# Patient Record
Sex: FEMALE | Race: WHITE | URBAN - METROPOLITAN AREA
[De-identification: names, ages, dates, MRNs, and addresses within clinical notes are randomized per-mention and may not be internally consistent; named-entity substitution may affect disease eponyms.]

---

## 2017-10-05 ENCOUNTER — HISTORICAL (OUTPATIENT)
Dept: ADMINISTRATIVE | Facility: HOSPITAL | Age: 67
End: 2017-10-05

## 2017-10-26 ENCOUNTER — HISTORICAL (OUTPATIENT)
Dept: ADMINISTRATIVE | Facility: HOSPITAL | Age: 67
End: 2017-10-26

## 2018-03-19 ENCOUNTER — HISTORICAL (OUTPATIENT)
Dept: INFUSION THERAPY | Facility: HOSPITAL | Age: 68
End: 2018-03-19

## 2019-03-25 ENCOUNTER — HISTORICAL (OUTPATIENT)
Dept: INFUSION THERAPY | Facility: HOSPITAL | Age: 69
End: 2019-03-25

## 2021-02-05 ENCOUNTER — HISTORICAL (OUTPATIENT)
Dept: ADMINISTRATIVE | Facility: HOSPITAL | Age: 71
End: 2021-02-05

## 2021-02-05 LAB
ALBUMIN SERPL-MCNC: 4.2 GM/DL (ref 3.4–4.8)
ALBUMIN/GLOB SERPL: 1.8 RATIO (ref 1.1–2)
ALP SERPL-CCNC: 57 UNIT/L (ref 40–150)
ALT SERPL-CCNC: 19 UNIT/L (ref 0–55)
AST SERPL-CCNC: 19 UNIT/L (ref 5–34)
BILIRUB SERPL-MCNC: 0.5 MG/DL
BILIRUBIN DIRECT+TOT PNL SERPL-MCNC: 0.2 MG/DL (ref 0–0.8)
BILIRUBIN DIRECT+TOT PNL SERPL-MCNC: 0.3 MG/DL (ref 0–0.5)
BUN SERPL-MCNC: 14.7 MG/DL (ref 9.8–20.1)
CALCIUM SERPL-MCNC: 9.7 MG/DL (ref 8.4–10.2)
CHLORIDE SERPL-SCNC: 102 MMOL/L (ref 98–107)
CO2 SERPL-SCNC: 26 MMOL/L (ref 23–31)
CREAT SERPL-MCNC: 0.86 MG/DL (ref 0.55–1.02)
GLOBULIN SER-MCNC: 2.4 GM/DL (ref 2.4–3.5)
GLUCOSE SERPL-MCNC: 89 MG/DL (ref 82–115)
POTASSIUM SERPL-SCNC: 4.5 MMOL/L (ref 3.5–5.1)
PROT SERPL-MCNC: 6.6 GM/DL (ref 5.8–7.6)
SODIUM SERPL-SCNC: 138 MMOL/L (ref 136–145)
T4 FREE SERPL-MCNC: 0.86 NG/DL (ref 0.7–1.48)
TSH SERPL-ACNC: 3.06 UIU/ML (ref 0.35–4.94)

## 2021-02-10 LAB
BUN SERPL-MCNC: 21 MG/DL (ref 7–18)
CALCIUM SERPL-MCNC: 9.9 MG/DL (ref 8.5–10.1)
CHLORIDE SERPL-SCNC: 103 MMOL/L (ref 98–107)
CO2 SERPL-SCNC: 27 MMOL/L (ref 21–32)
CREAT SERPL-MCNC: 0.77 MG/DL (ref 0.6–1.3)
GLUCOSE SERPL-MCNC: 113 MG/DL (ref 74–106)
POTASSIUM SERPL-SCNC: 4.2 MMOL/L (ref 3.5–5.1)
SODIUM SERPL-SCNC: 136 MEQ/L (ref 131–145)

## 2021-03-09 ENCOUNTER — HISTORICAL (OUTPATIENT)
Dept: ADMINISTRATIVE | Facility: HOSPITAL | Age: 71
End: 2021-03-09

## 2021-03-09 LAB
T4 FREE SERPL-MCNC: 1.17 NG/DL (ref 0.7–1.48)
TSH SERPL-ACNC: 0.35 UIU/ML (ref 0.35–4.94)

## 2021-04-21 ENCOUNTER — HISTORICAL (OUTPATIENT)
Dept: ADMINISTRATIVE | Facility: HOSPITAL | Age: 71
End: 2021-04-21

## 2021-04-21 LAB
T4 FREE SERPL-MCNC: 1.2 NG/DL (ref 0.7–1.48)
TSH SERPL-ACNC: 0.44 UIU/ML (ref 0.35–4.94)

## 2022-04-07 ENCOUNTER — HISTORICAL (OUTPATIENT)
Dept: ADMINISTRATIVE | Facility: HOSPITAL | Age: 72
End: 2022-04-07

## 2022-04-24 VITALS
WEIGHT: 121.13 LBS | OXYGEN SATURATION: 99 % | HEIGHT: 64 IN | DIASTOLIC BLOOD PRESSURE: 81 MMHG | SYSTOLIC BLOOD PRESSURE: 151 MMHG | BODY MASS INDEX: 20.68 KG/M2

## 2022-09-15 ENCOUNTER — HISTORICAL (OUTPATIENT)
Dept: ADMINISTRATIVE | Facility: HOSPITAL | Age: 72
End: 2022-09-15

## 2025-03-04 ENCOUNTER — HOSPITAL ENCOUNTER (INPATIENT)
Facility: HOSPITAL | Age: 75
LOS: 14 days | Discharge: NURSING, REHAB OR LONG-TERM CARE | DRG: 312 | End: 2025-03-18
Attending: STUDENT IN AN ORGANIZED HEALTH CARE EDUCATION/TRAINING PROGRAM | Admitting: INTERNAL MEDICINE
Payer: MEDICARE

## 2025-03-04 DIAGNOSIS — R41.82 AMS (ALTERED MENTAL STATUS): Primary | ICD-10-CM

## 2025-03-04 DIAGNOSIS — I63.9 CVA (CEREBRAL VASCULAR ACCIDENT): ICD-10-CM

## 2025-03-04 DIAGNOSIS — R94.31 QT PROLONGATION: ICD-10-CM
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
## 2025-03-04 DIAGNOSIS — S00.03XA SCALP HEMATOMA, INITIAL ENCOUNTER: ICD-10-CM

## 2025-03-04 DIAGNOSIS — I63.9 ACUTE CVA (CEREBROVASCULAR ACCIDENT): ICD-10-CM

## 2025-03-04 LAB
ABORH RETYPE: NORMAL
ACCEPTIBLE SP GR UR QL: 1.03 (ref 1–1.03)
ALBUMIN SERPL-MCNC: 3.8 G/DL (ref 3.4–4.8)
ALBUMIN/GLOB SERPL: 1.3 RATIO (ref 1.1–2)
ALP SERPL-CCNC: 58 UNIT/L (ref 40–150)
ALT SERPL-CCNC: 17 UNIT/L (ref 0–55)
AMPHET UR QL SCN: NEGATIVE
ANION GAP SERPL CALC-SCNC: 12 MEQ/L
APICAL FOUR CHAMBER EJECTION FRACTION: 61 %
APICAL TWO CHAMBER EJECTION FRACTION: 57 %
APTT PPP: 26.7 SECONDS (ref 23.2–33.7)
AST SERPL-CCNC: 33 UNIT/L (ref 5–34)
AV INDEX (PROSTH): 0.75
AV MEAN GRADIENT: 4 MMHG
AV PEAK GRADIENT: 9 MMHG
AV VELOCITY RATIO: 0.8
BACTERIA #/AREA URNS AUTO: ABNORMAL /HPF
BARBITURATE SCN PRESENT UR: NEGATIVE
BASOPHILS # BLD AUTO: 0.02 X10(3)/MCL
BASOPHILS NFR BLD AUTO: 0.3 %
BENZODIAZ UR QL SCN: POSITIVE
BILIRUB SERPL-MCNC: 0.3 MG/DL
BILIRUB UR QL STRIP.AUTO: NEGATIVE
BSA FOR ECHO PROCEDURE: 1.57 M2
BUN SERPL-MCNC: 12 MG/DL (ref 8.4–25.7)
CALCIUM SERPL-MCNC: 9.2 MG/DL (ref 8.8–10)
CANNABINOIDS UR QL SCN: NEGATIVE
CHLORIDE SERPL-SCNC: 96 MMOL/L (ref 98–107)
CHOLEST SERPL-MCNC: 182 MG/DL
CHOLEST/HDLC SERPL: 2 {RATIO} (ref 0–5)
CK SERPL-CCNC: 232 U/L
CLARITY UR: ABNORMAL
CO2 SERPL-SCNC: 24 MMOL/L (ref 23–31)
COCAINE UR QL SCN: NEGATIVE
COLOR UR AUTO: YELLOW
CREAT SERPL-MCNC: 0.85 MG/DL (ref 0.72–1.25)
CREAT/UREA NIT SERPL: 14
CV ECHO LV RWT: 0.82 CM
DOP CALC AO PEAK VEL: 1.5 M/S
DOP CALC AO VTI: 25.4 CM
DOP CALC LVOT PEAK VEL: 1.2 M/S
DOP CALCLVOT PEAK VEL VTI: 19 CM
E/A RATIO: 0.6
E/E' RATIO: 11 M/S
ECHO LV POSTERIOR WALL: 1.4 CM (ref 0.6–1.1)
EOSINOPHIL # BLD AUTO: 0.1 X10(3)/MCL (ref 0–0.9)
EOSINOPHIL NFR BLD AUTO: 1.7 %
ERYTHROCYTE [DISTWIDTH] IN BLOOD BY AUTOMATED COUNT: 14.9 % (ref 11.5–17)
ETHANOL SERPL-MCNC: <10 MG/DL
FENTANYL UR QL SCN: NEGATIVE
FRACTIONAL SHORTENING: 41.2 % (ref 28–44)
GFR SERPLBLD CREATININE-BSD FMLA CKD-EPI: >60 ML/MIN/1.73/M2
GLOBULIN SER-MCNC: 3 GM/DL (ref 2.4–3.5)
GLUCOSE SERPL-MCNC: 103 MG/DL (ref 82–115)
GLUCOSE UR QL STRIP: NORMAL
GROUP & RH: NORMAL
HCT VFR BLD AUTO: 39.7 % (ref 42–52)
HDLC SERPL-MCNC: 96 MG/DL (ref 35–60)
HGB BLD-MCNC: 13.3 G/DL (ref 14–18)
HGB UR QL STRIP: NEGATIVE
IMM GRANULOCYTES # BLD AUTO: 0.02 X10(3)/MCL (ref 0–0.04)
IMM GRANULOCYTES NFR BLD AUTO: 0.3 %
INDIRECT COOMBS: NORMAL
INR PPP: 0.9
INTERVENTRICULAR SEPTUM: 1 CM (ref 0.6–1.1)
KETONES UR QL STRIP: NEGATIVE
LACTATE SERPL-SCNC: 1.9 MMOL/L (ref 0.5–2.2)
LACTATE SERPL-SCNC: 2.4 MMOL/L (ref 0.5–2.2)
LDLC SERPL CALC-MCNC: 73 MG/DL (ref 50–140)
LEFT ATRIUM SIZE: 2.7 CM
LEFT INTERNAL DIMENSION IN SYSTOLE: 2 CM (ref 2.1–4)
LEFT VENTRICLE DIASTOLIC VOLUME INDEX: 30.63 ML/M2
LEFT VENTRICLE DIASTOLIC VOLUME: 49 ML
LEFT VENTRICLE END DIASTOLIC VOLUME APICAL 2 CHAMBER: 65.2 ML
LEFT VENTRICLE END DIASTOLIC VOLUME APICAL 4 CHAMBER: 66.3 ML
LEFT VENTRICLE MASS INDEX: 81.4 G/M2
LEFT VENTRICLE SYSTOLIC VOLUME INDEX: 8.1 ML/M2
LEFT VENTRICLE SYSTOLIC VOLUME: 13 ML
LEFT VENTRICULAR INTERNAL DIMENSION IN DIASTOLE: 3.4 CM (ref 3.5–6)
LEFT VENTRICULAR MASS: 130.2 G
LEUKOCYTE ESTERASE UR QL STRIP: NEGATIVE
LV LATERAL E/E' RATIO: 8.9 M/S
LV SEPTAL E/E' RATIO: 14.2 M/S
LVED V (TEICH): 48.8 ML
LVES V (TEICH): 13.2 ML
LVOT MG: 3 MMHG
LVOT MV: 0.79 CM/S
LYMPHOCYTES # BLD AUTO: 1.76 X10(3)/MCL (ref 0.6–4.6)
LYMPHOCYTES NFR BLD AUTO: 29.2 %
MCH RBC QN AUTO: 31.6 PG (ref 27–31)
MCHC RBC AUTO-ENTMCNC: 33.5 G/DL (ref 33–36)
MCV RBC AUTO: 94.3 FL (ref 80–94)
MDMA UR QL SCN: NEGATIVE
MONOCYTES # BLD AUTO: 0.95 X10(3)/MCL (ref 0.1–1.3)
MONOCYTES NFR BLD AUTO: 15.8 %
MUCOUS THREADS URNS QL MICRO: ABNORMAL /LPF
MV PEAK A VEL: 1.18 M/S
MV PEAK E VEL: 0.71 M/S
NEUTROPHILS # BLD AUTO: 3.17 X10(3)/MCL (ref 2.1–9.2)
NEUTROPHILS NFR BLD AUTO: 52.7 %
NITRITE UR QL STRIP: NEGATIVE
NRBC BLD AUTO-RTO: 0 %
OHS LV EJECTION FRACTION SIMPSONS BIPLANE MOD: 58 %
OPIATES UR QL SCN: NEGATIVE
PCP UR QL: NEGATIVE
PH UR STRIP: 7.5 [PH]
PH UR: 7.5 [PH] (ref 3–11)
PISA TR MAX VEL: 2.9 M/S
PLATELET # BLD AUTO: 128 X10(3)/MCL (ref 130–400)
PMV BLD AUTO: 13 FL (ref 7.4–10.4)
POTASSIUM SERPL-SCNC: 4.4 MMOL/L (ref 3.5–5.1)
PROT SERPL-MCNC: 6.8 GM/DL (ref 5.8–7.6)
PROT UR QL STRIP: NEGATIVE
PROTHROMBIN TIME: 11.9 SECONDS (ref 12.5–14.5)
PV PEAK GRADIENT: 13 MMHG
PV PEAK VELOCITY: 1.8 M/S
RBC # BLD AUTO: 4.21 X10(6)/MCL (ref 4.7–6.1)
RBC #/AREA URNS AUTO: ABNORMAL /HPF
SODIUM SERPL-SCNC: 132 MMOL/L (ref 136–145)
SP GR UR STRIP.AUTO: 1.03 (ref 1–1.03)
SPECIMEN OUTDATE: NORMAL
SQUAMOUS #/AREA URNS LPF: ABNORMAL /HPF
TDI LATERAL: 0.08 M/S
TDI SEPTAL: 0.05 M/S
TDI: 0.07 M/S
TRICUSPID ANNULAR PLANE SYSTOLIC EXCURSION: 2.46 CM
TRIGL SERPL-MCNC: 65 MG/DL (ref 34–140)
TSH SERPL-ACNC: 3.6 UIU/ML (ref 0.35–4.94)
UROBILINOGEN UR STRIP-ACNC: NORMAL
VLDLC SERPL CALC-MCNC: 13 MG/DL
WBC # BLD AUTO: 6.02 X10(3)/MCL (ref 4.5–11.5)
WBC #/AREA URNS AUTO: ABNORMAL /HPF
Z-SCORE OF LEFT VENTRICULAR DIMENSION IN END DIASTOLE: -2.92
Z-SCORE OF LEFT VENTRICULAR DIMENSION IN END SYSTOLE: -2.73

## 2025-03-04 PROCEDURE — 96374 THER/PROPH/DIAG INJ IV PUSH: CPT

## 2025-03-04 PROCEDURE — 21400001 HC TELEMETRY ROOM

## 2025-03-04 PROCEDURE — 80053 COMPREHEN METABOLIC PANEL: CPT | Performed by: SURGERY

## 2025-03-04 PROCEDURE — 87040 BLOOD CULTURE FOR BACTERIA: CPT | Performed by: INTERNAL MEDICINE

## 2025-03-04 PROCEDURE — 82077 ASSAY SPEC XCP UR&BREATH IA: CPT | Performed by: SURGERY

## 2025-03-04 PROCEDURE — 99223 1ST HOSP IP/OBS HIGH 75: CPT | Mod: GC,,, | Performed by: SURGERY

## 2025-03-04 PROCEDURE — 63600175 PHARM REV CODE 636 W HCPCS: Performed by: INTERNAL MEDICINE

## 2025-03-04 PROCEDURE — 96372 THER/PROPH/DIAG INJ SC/IM: CPT | Performed by: STUDENT IN AN ORGANIZED HEALTH CARE EDUCATION/TRAINING PROGRAM

## 2025-03-04 PROCEDURE — 36415 COLL VENOUS BLD VENIPUNCTURE: CPT | Performed by: INTERNAL MEDICINE

## 2025-03-04 PROCEDURE — 81001 URINALYSIS AUTO W/SCOPE: CPT | Mod: XB | Performed by: SURGERY

## 2025-03-04 PROCEDURE — 80307 DRUG TEST PRSMV CHEM ANLYZR: CPT | Performed by: SURGERY

## 2025-03-04 PROCEDURE — 25000003 PHARM REV CODE 250: Performed by: SURGERY

## 2025-03-04 PROCEDURE — 63600175 PHARM REV CODE 636 W HCPCS: Performed by: STUDENT IN AN ORGANIZED HEALTH CARE EDUCATION/TRAINING PROGRAM

## 2025-03-04 PROCEDURE — G0390 TRAUMA RESPONS W/HOSP CRITI: HCPCS

## 2025-03-04 PROCEDURE — 86901 BLOOD TYPING SEROLOGIC RH(D): CPT | Performed by: SURGERY

## 2025-03-04 PROCEDURE — 85730 THROMBOPLASTIN TIME PARTIAL: CPT | Performed by: SURGERY

## 2025-03-04 PROCEDURE — 85610 PROTHROMBIN TIME: CPT | Performed by: SURGERY

## 2025-03-04 PROCEDURE — 63600175 PHARM REV CODE 636 W HCPCS: Performed by: NURSE PRACTITIONER

## 2025-03-04 PROCEDURE — 25000003 PHARM REV CODE 250: Performed by: INTERNAL MEDICINE

## 2025-03-04 PROCEDURE — 84443 ASSAY THYROID STIM HORMONE: CPT | Performed by: NURSE PRACTITIONER

## 2025-03-04 PROCEDURE — 36415 COLL VENOUS BLD VENIPUNCTURE: CPT | Performed by: SURGERY

## 2025-03-04 PROCEDURE — 25500020 PHARM REV CODE 255: Performed by: STUDENT IN AN ORGANIZED HEALTH CARE EDUCATION/TRAINING PROGRAM

## 2025-03-04 PROCEDURE — 82550 ASSAY OF CK (CPK): CPT | Performed by: STUDENT IN AN ORGANIZED HEALTH CARE EDUCATION/TRAINING PROGRAM

## 2025-03-04 PROCEDURE — 99285 EMERGENCY DEPT VISIT HI MDM: CPT | Mod: 25

## 2025-03-04 PROCEDURE — 80061 LIPID PANEL: CPT | Performed by: NURSE PRACTITIONER

## 2025-03-04 PROCEDURE — 83605 ASSAY OF LACTIC ACID: CPT | Performed by: SURGERY

## 2025-03-04 PROCEDURE — 85025 COMPLETE CBC W/AUTO DIFF WBC: CPT | Performed by: SURGERY

## 2025-03-04 RX ORDER — HALOPERIDOL LACTATE 5 MG/ML
5 INJECTION, SOLUTION INTRAMUSCULAR EVERY 6 HOURS PRN
Status: DISCONTINUED | OUTPATIENT
Start: 2025-03-04 | End: 2025-03-18 | Stop reason: HOSPADM

## 2025-03-04 RX ORDER — MYCOPHENOLATE MOFETIL 500 MG/1
500 TABLET ORAL 2 TIMES DAILY
Status: ON HOLD | COMMUNITY
End: 2025-03-14 | Stop reason: HOSPADM

## 2025-03-04 RX ORDER — ONDANSETRON HYDROCHLORIDE 2 MG/ML
4 INJECTION, SOLUTION INTRAVENOUS EVERY 8 HOURS PRN
Status: DISCONTINUED | OUTPATIENT
Start: 2025-03-04 | End: 2025-03-18 | Stop reason: HOSPADM

## 2025-03-04 RX ORDER — PREDNISONE 5 MG/1
5 TABLET ORAL DAILY
COMMUNITY

## 2025-03-04 RX ORDER — ACETAMINOPHEN 325 MG/1
650 TABLET ORAL EVERY 4 HOURS PRN
Status: DISCONTINUED | OUTPATIENT
Start: 2025-03-04 | End: 2025-03-04

## 2025-03-04 RX ORDER — HALOPERIDOL LACTATE 5 MG/ML
5 INJECTION, SOLUTION INTRAMUSCULAR
Status: COMPLETED | OUTPATIENT
Start: 2025-03-04 | End: 2025-03-04

## 2025-03-04 RX ORDER — LABETALOL HYDROCHLORIDE 5 MG/ML
10 INJECTION, SOLUTION INTRAVENOUS EVERY 6 HOURS PRN
Status: DISCONTINUED | OUTPATIENT
Start: 2025-03-04 | End: 2025-03-18 | Stop reason: HOSPADM

## 2025-03-04 RX ORDER — TROSPIUM CHLORIDE 20 MG/1
20 TABLET, FILM COATED ORAL 2 TIMES DAILY
Status: ON HOLD | COMMUNITY
End: 2025-03-14 | Stop reason: HOSPADM

## 2025-03-04 RX ORDER — LEVOTHYROXINE SODIUM 50 UG/1
50 TABLET ORAL
Status: ON HOLD | COMMUNITY
End: 2025-03-14 | Stop reason: HOSPADM

## 2025-03-04 RX ORDER — LORAZEPAM 2 MG/ML
2 INJECTION INTRAMUSCULAR ONCE
Status: COMPLETED | OUTPATIENT
Start: 2025-03-04 | End: 2025-03-04

## 2025-03-04 RX ORDER — LEVOTHYROXINE SODIUM 75 UG/1
75 TABLET ORAL
Status: ON HOLD | COMMUNITY
End: 2025-03-14 | Stop reason: HOSPADM

## 2025-03-04 RX ORDER — ACETAMINOPHEN 650 MG/1
650 SUPPOSITORY RECTAL EVERY 6 HOURS PRN
Status: DISCONTINUED | OUTPATIENT
Start: 2025-03-04 | End: 2025-03-05

## 2025-03-04 RX ORDER — SODIUM CHLORIDE 9 MG/ML
INJECTION, SOLUTION INTRAVENOUS
Status: COMPLETED | OUTPATIENT
Start: 2025-03-04 | End: 2025-03-04

## 2025-03-04 RX ORDER — DIPHENHYDRAMINE HYDROCHLORIDE 50 MG/ML
25 INJECTION, SOLUTION INTRAMUSCULAR; INTRAVENOUS
Status: COMPLETED | OUTPATIENT
Start: 2025-03-04 | End: 2025-03-04

## 2025-03-04 RX ORDER — ACETAMINOPHEN 325 MG/1
650 TABLET ORAL EVERY 8 HOURS PRN
Status: DISCONTINUED | OUTPATIENT
Start: 2025-03-04 | End: 2025-03-04

## 2025-03-04 RX ORDER — AMLODIPINE BESYLATE 10 MG/1
10 TABLET ORAL DAILY
Status: ON HOLD | COMMUNITY
End: 2025-03-14 | Stop reason: HOSPADM

## 2025-03-04 RX ORDER — BISACODYL 10 MG/1
10 SUPPOSITORY RECTAL DAILY PRN
Status: DISCONTINUED | OUTPATIENT
Start: 2025-03-04 | End: 2025-03-18 | Stop reason: HOSPADM

## 2025-03-04 RX ORDER — SODIUM CHLORIDE 9 MG/ML
INJECTION, SOLUTION INTRAVENOUS CONTINUOUS
Status: ACTIVE | OUTPATIENT
Start: 2025-03-04 | End: 2025-03-05

## 2025-03-04 RX ADMIN — HALOPERIDOL LACTATE 5 MG: 5 INJECTION, SOLUTION INTRAMUSCULAR at 01:03

## 2025-03-04 RX ADMIN — LORAZEPAM 2 MG: 2 INJECTION INTRAMUSCULAR; INTRAVENOUS at 11:03

## 2025-03-04 RX ADMIN — DIPHENHYDRAMINE HYDROCHLORIDE 25 MG: 50 INJECTION INTRAMUSCULAR; INTRAVENOUS at 01:03

## 2025-03-04 RX ADMIN — SODIUM CHLORIDE: 9 INJECTION, SOLUTION INTRAVENOUS at 03:03

## 2025-03-04 RX ADMIN — HALOPERIDOL LACTATE 5 MG: 5 INJECTION, SOLUTION INTRAMUSCULAR at 08:03

## 2025-03-04 RX ADMIN — IOHEXOL 100 ML: 350 INJECTION, SOLUTION INTRAVENOUS at 12:03

## 2025-03-04 RX ADMIN — SODIUM CHLORIDE 999 ML/HR: 9 INJECTION, SOLUTION INTRAVENOUS at 11:03

## 2025-03-04 RX ADMIN — ACETAMINOPHEN 650 MG: 650 SUPPOSITORY RECTAL at 03:03

## 2025-03-04 NOTE — AI DETERIORATION ALERT
Artificial Intelligence Notification  [facility]  [address]  [place] [address]  Phone: [phone]    This documentation was triggered by an Artificial Intelligence Notification:    Admit Date: 3/4/2025   LOS: 0  Code Status: Full Code  : 3/4/1952  Age: 73 y.o.  Weight:   Wt Readings from Last 1 Encounters:   25 52.2 kg (115 lb)        Sex: adult  Bed: [address] [place] [address] A  MRN: [id number]  Attending Physician: Francis Valentin MD     Date of Alert: 2025  Time AI Alert Received: 14:04            Vitals:    25 1401   BP: (!) 143/70   Pulse: (!) 114   Resp:    Temp: 98.3 °F (36.8 °C)     SpO2: 98 %      Artificial Intelligence alert discussed with Provider:     Name: ERICK Gomez    Date/Time of Provider Notification: 14:35      Patient Condition: pt was moving when vitals were being taken. I assessed patient at bedside with nurse. Pt lying in bed while nurse removes IV. No ICU intervention at this time.

## 2025-03-04 NOTE — ED NOTES
Pt. Cleaned at this time small amount of BM  noted.. no noted breakdown. Purewick in place at this time. .will continue to monitor

## 2025-03-04 NOTE — ED NOTES
"Pt. Now answering questions. Verbalized unable to visualize myself or anything around her. Attempted to orient pt. At this time. Pt. "Verbalizes my name is Ana Maria" does not not verbalize an answer when asked her last night and reports her  is 1950. Will continue to monitor   "

## 2025-03-04 NOTE — Clinical Note
Diagnosis: AMS (altered mental status) [8606725]   Future Attending Provider: PHILIPPE PENA [[id number]]   Admit to which facility:: [facility] [[id number]]   Reason for IP Medical Treatment  (Clinical interventions that can only be accomplished in the IP setting? ) :: AMS (altered mental status)   Reason for IP Medical Treatment  (Clinical interventions that can only be accomplished in the IP setting? ) :: CVA work up

## 2025-03-04 NOTE — CONSULTS
"   Trauma Surgery   Activation Note    Patient Name: Senthil Cisse  MRN: [id number]   YOB: 1952  Date: 03/04/2025    LEVEL 1 TRAUMA     Subjective:   History source(s): EMS and chart   History of present illness:  [employer] [age] yo F found down for unknown period of time. Found to be unresponsive to voice, localizes pain. EMS reports they were called for a wellness check.  EMS reports patient has a hematoma to her head. EMS reports patient initially GCS-9. EMS reports administering 200 of ketamine prior to arrival. EMS states unsure if patient is taking blood thinners. Bowel incontinence on arrival.    Primary Survey:  A patent   B EBSBL   C 2+ distal pulses   D GCS 8(E 2, V 2, M 4)    E exposed, log-rolled and examined (see below)   F See below     VITAL SIGNS: 24 HR MIN & MAX LAST   Temp  Min: 97.3 °F (36.3 °C)  Max: 97.4 °F (36.3 °C)  97.3 °F (36.3 °C)   BP  Min: 121/77  Max: 157/94  121/77    Pulse  Min: 96  Max: 108  100    Resp  Min: 15  Max: 23  (!) 21    SpO2  Min: 92 %  Max: 98 %  98 %      HT: 5' 7" (170.2 cm)  WT: 52.2 kg (115 lb)  BMI: 18     FAST: negative for free fluid    Medications/transfusions received en-route: 200 Ketamine  Medications/transfusions received in trauma bay: 1L fluid bolus    Scheduled Meds:  Continuous Infusions:      PRN Meds:    ROS: unable to assess secondary to patients condition     Allergies: unable to obtain secondary to acuity of the patient  PMH: unable to obtain secondary to acuity of the patient  PSH: unable to obtain secondary to acuity of the patient  Social history: unable to obtain secondary to acuity of the patient  Objective:   Secondary Survey:   General: Well nourished, not following commands or answering questions, GCS8  Neuro: Left facial droop, rightward gaze, II intact, unable to assess ocular movements, patient not following commands, localizing to pain  HEENT:  Normocephalic, left scalp hematoma, PERRL, cervical collar in place  CV:  " "RRR  Pulse: 2+ RP b/l, 2+ DP b/l   Resp/chest:  Non-labored breathing, satting on room air  GI:  Abdomen soft, non-tender, non-distended  :  Normal external  genitalia,  no blood at urethral meatus.   Rectal: Deferred  Extremities: Moves all 4 spontaneously and purposefully, no obvious gross deformities.  Back/Spine: No bony TTP, no palpable step offs or deformities.  Cervical back: Normal. No tenderness.  Thoracic back: Normal. No tenderness.  Lumbar back: Normal. No tenderness.  Skin/wounds:  Warm, well perfused, L elbow skin tear  Psych: Normal mood and affect.    Labs:  Troponin:  No results for input(s): "TROPONINI" in the last 72 hours.  CBC:  Recent Labs     03/04/25  1127   WBC 6.02   RBC 4.21*   HGB 13.3*   HCT 39.7*   *   MCV 94.3*   MCH 31.6*   MCHC 33.5     CMP:  Recent Labs     03/04/25  1127   CALCIUM 9.2   ALBUMIN 3.8   *   K 4.4   CO2 24   CL 96*   BUN 12.0   CREATININE 0.85   ALKPHOS 58   ALT 17   AST 33   BILITOT 0.3     Lactic Acid:  Recent Labs     03/04/25  1127   LACTATE 2.4*     ETOH:  Recent Labs     03/04/25  1127   ETHANOL <10.0      Urine Drug Screen:  No results for input(s): "COCAINE", "OPIATE", "BARBITURATE", "AMPHETAMINE", "FENTANYL", "CANNABINOIDS", "MDMA" in the last 72 hours.    Invalid input(s): "BENZODIAZEPINE", "PHENCYCLIDINE"   ABG:  No results for input(s): "PH", "PO2", "PCO2", "HCO3", "BE" in the last 168 hours.   I have reviewed all pertinent lab results within the past 24 hours.    Imaging:  Imaging Results              CTA Head and Neck (xpd) (Final result)  Result time 03/04/25 12:15:46      Final result by Kyle Pina MD (03/04/25 12:15:46)                   Impression:        No evidence of large vessel occlusion seen    Some areas of atherosclerotic plaque as outlined above but no high-grade stenosis seen    Soft tissue cephalohematoma in the scalp in the left posterior parietal region.      Electronically signed by: Sae " Sachasin  Date:    03/04/2025  Time:    12:15               Narrative:    EXAMINATION:  CTA HEAD AND NECK (XPD)    TECHNIQUE:  Non contrast low dose axial images were obtained through the head.  CT angiogram was performed from the level of the katherine to the top of the head following the IV administration 100 cc of Isovue 370 contrast .  Sagittal and coronal reconstructions and maximum intensity projection reconstructions were performed. Arterial stenosis percentages are based on NASCET measurement criteria.  Additional multiplanar reconstructions were performed on post processed imaging.  Automatic exposure control (AEC) is utilized to reduce patient radiation exposure.    COMPARISON:  None    FINDINGS:  Source images: No intracranial mass lesion is seen.  No hemorrhage is seen.  No infarct is seen.  Some cerebral atrophy seen consistent with patient's age.  Some compensatory ventricular dilatation and periventricular white matter changes seen consistent patient's age.  There is a soft tissue cephalhematoma in the posterior parietal region on the left side..  No cervical mass or lesion is seen.  No abnormal lymphadenopathy seen.  Thyroid appears normal.  Larynx appears normal.  Trachea is midline.  Thoracic inlet appears normal.    Vascular images: The aortic arch is normal in caliber.  Some atherosclerotic plaque is seen.  There is a 2 vessel arch seen which is a normal variant.  The common carotid arteries are widely patent.  There is calcified plaque seen in the right carotid bulb and proximal internal carotid artery but no hemodynamically significant stenosis is seen.  There is some calcified plaque in the left carotid bulb and proximal internal carotid artery but no hemodynamically significant stenosis is seen.    Both internal carotid arteries are patent distally and seen to level the clinoid and supraclinoid region.  Some calcified plaque is seen at the cavernous and clinoid portions of the internal carotid  arteries with areas of stenosis measuring up to 40%.    The MCAs are patent.  The M1 segments, M2 segments M3 segments are patent.    The A1 segments are patent.  Anterior cerebral arteries are widely patent.  No aneurysm or obstruction is seen.    Both vertebral arteries are patent.  They are normal in caliber.  No dissection is seen.  No fibromuscular dysplasia is seen.  Both vertebral arteries perfuse a normal appearing basilar artery.  Posterior cerebral arteries are widely patent with no aneurysm or obstruction is seen.    .                                       CT Cervical Spine Without Contrast (Final result)  Result time 03/04/25 12:07:05      Final result by Kyle Pina MD (03/04/25 12:07:05)                   Impression:      Degenerative changes in the cervical spine otherwise unremarkable      Electronically signed by: Sae Pina  Date:    03/04/2025  Time:    12:07               Narrative:    EXAMINATION:  CT CERVICAL SPINE WITHOUT CONTRAST    CLINICAL HISTORY:  Trauma;    TECHNIQUE:  Low dose axial images, sagittal and coronal reformations were performed though the cervical spine.  Contrast was not administered. Automatic exposure control is utilized to reduce patient radiation exposure.    COMPARISON:  None    FINDINGS:  The vertebral body heights are well maintained.  The alignment is well maintained.  The bones are  osteoporotic.  There are degenerative changes seen throughout the cervical spine.  No evidence of acute fracture is seen.  No dislocation is seen.  Odontoid lateral masses appear grossly unremarkable.  No soft tissue abnormality is seen.  No retropharyngeal abnormality is seen.  Visualized portions of the airway appear grossly unremarkable.  Visualized portion of the thoracic inlet appears unremarkable                                       CT Head Without Contrast (Final result)  Result time 03/04/25 12:15:08      Final result by Italia Tang MD (03/04/25  12:15:08)                   Impression:      1. Mild hyperdensity along the falx.  Differential considerations include trace subdural hemorrhage or calcifications.  In the absence of outside prior for comparison, suggest short interval follow-up head CT to reassess.  2. Chronic cerebral atrophy and small-vessel ischemic change  3. Left occipital scalp hematoma.      Electronically signed by: Italia Tang  Date:    03/04/2025  Time:    12:15               Narrative:    EXAMINATION:  CT HEAD WITHOUT CONTRAST    CLINICAL HISTORY:  Trauma;  found down.  Occipital hematoma.  Left arm skin tear.  Patient nonverbal at this time.  Last seen normal yesterday.    TECHNIQUE:  Low dose axial CT images obtained throughout the head without intravenous contrast.  Axial, sagittal and coronal reconstructions were performed and interpreted.    DLP: 1162 mGycm    All CT scans at this location are performed using dose optimization techniques as appropriate to a performed exam including the following automated exposure control, adjustment of the mA and/or kV according to patient size and/or use of iterative reconstruction technique    COMPARISON:  No relevant prior available for comparison.    FINDINGS:  BRAIN: Gray white differentiation is maintained. Moderate periventricular and subcortical white matter changes most compatible with chronic small vessel ischemic disease.  No edema. No mass effect or midline shift.  There is cerebral atrophy.  The posterior fossa and midline structures are unremarkable.    VENTRICLES: Ex vacuo dilatation of the ventricles.    EXTRA-AXIAL: Mild hyperdensity at the falx.  Unable to differentiate trace subdural hemorrhage versus calcifications along the falx.    BONES: Calvarium is intact.    SINUSES AND MASTOIDS: Visualized paranasal sinuses and mastoid air cells are clear.    OTHER: Left occipital scalp hematoma.                                       X-Ray Chest 1 View (Final result)  Result time  03/04/25 11:58:22      Final result by Kyle Pina MD (03/04/25 11:58:22)                   Impression:      No acute process      Electronically signed by: Sae Pina  Date:    03/04/2025  Time:    11:58               Narrative:    EXAMINATION:  XR CHEST 1 VIEW    CLINICAL HISTORY:  r/o bleeding or hemorrhage;    TECHNIQUE:  Single frontal view of the chest was performed.    COMPARISON:  None available    FINDINGS:  There are chronic appearing lung changes seen bilaterally. No evidence of acute infiltrate is seen. No mass is seen. No lesion is seen. No pleural effusion is seen. The heart appears normal. The aorta is ectatic.  The bones and joints show no acute abnormality.                                     I have reviewed all pertinent imaging results/findings within the past 24 hours.    Assessment & Plan:   Approx 73 F, presents as Lvl1 after found down for indeterminate amount of time and left scalp hematoma, GCS 8. CT head noting mild hyperdensity at the falx which radiology is unable to differentiate between calcifications v SDH, and chronic small vessel ischemic disease. Patient improving in ED, now talking. No other acute injuries identified on imaging.     Fall, unknown down time  - no acute injuries requiring admission to trauma service   - disposition per ED  - recommend medicine consult for admission to workup for metabolic causes      Дмитрий Cueto M.D.   [facility] General Surgery - PGY1

## 2025-03-04 NOTE — ED NOTES
Pt. Attempting to get out of bed. Attempted to reorient pt. At this time. Md notified. Dr. Brown in room to reevaluate pt. At this time. Reports will medicate pt. And reevaluate  Pt. Moving all extremities at this time. Noted multiple attempts to sit up in bed. Attempted to reorient at this time and help pt. Back into bed

## 2025-03-04 NOTE — H&P
[facility] Medicine History & Physical Examination       Patient Name: Senthil Cisse  MRN: [id number]  Patient Class: IP- Inpatient   Admission Date: 3/4/2025   Admitting Physician: KJ Service   Length of Stay: 0  Attending Physician: Francis Valentin MD  Primary Care Provider: No primary care provider on file.  Face-to-Face encounter date: 03/04/2025  Code Status: Full  Chief Complaint: No chief complaint on file.      Screening for Social Drivers for health:  Patient screened for food insecurity, housing instability, transportation needs, utility difficulties, and interpersonal safety (select all that apply as identified as concern)  []Housing or Food  []Transportation Needs  []Utility Difficulties  []Interpersonal safety  [x]None      Patient information was obtained from patient, patient's family, past medical records and ER records.  ED records were reviewed in detail and documented below    HISTORY OF PRESENT ILLNESS:   Senthil Cisse is a 73 y.o. adult who  has no past medical history on file.. The patient presented to [facility] on 3/4/2025 with a primary complaint of being in altered mental status. She is now confused, able to say yes or no. Occasionally following verbal commands. Most of the history is from the medical records.   She has been afebrile. Now in restrains. CT brain showed   Impression:  1. Mild hyperdensity along the falx.  Differential considerations include trace subdural hemorrhage or calcifications.  In the absence of outside prior for comparison, suggest short interval follow-up head CT to reassess.  2. Chronic cerebral atrophy and small-vessel ischemic change  3. Left occipital scalp hematoma.      CTA brain showed no occulusion. Labs and vitals stable.   She will be admitted for further evaluation. Seen by trauma team and cleared.     No family at bedside.   PAST MEDICAL HISTORY:   Unable to review    PAST SURGICAL HISTORY:    Unable to review    ALLERGIES:   Patient has no allergy information on record.    FAMILY HISTORY:   Reviewed and negative    SOCIAL HISTORY:     Social History     Tobacco Use    Smoking status: Not on file    Smokeless tobacco: Not on file   Substance Use Topics    Alcohol use: Not on file        HOME MEDICATIONS:     Prior to Admission medications    Not on File       REVIEW OF SYSTEMS:   Except as documented, all other systems reviewed and negative     PHYSICAL EXAM:     VITAL SIGNS: 24 HRS MIN & MAX LAST   Temp  Min: 97.3 °F (36.3 °C)  Max: 98.3 °F (36.8 °C) 98.3 °F (36.8 °C)   BP  Min: 121/77  Max: 157/94 (!) 143/70   Pulse  Min: 96  Max: 114  (!) 114   Resp  Min: 15  Max: 26 (!) 26   SpO2  Min: 92 %  Max: 99 % 98 %     General appearance: Patient frail, awake, able to say few words, confused and not following verbal commands. HENT: Atraumatic head. Moist mucous membranes of oral cavity.  Eyes: Normal extraocular movements.   Neck: Supple.   Lungs: Clear to auscultation bilaterally. No wheezing present.   Heart: Regular rate and rhythm. S1 and S2 present with no murmurs/gallop/rub. No pedal edema. No JVD present.   Abdomen: Soft, non-distended, non-tender. No rebound tenderness/guarding. Bowel sounds are normal.   Extremities: No cyanosis, clubbing, or edema.  Skin: No Rash.   Neuro: Moving extremities   Psych/mental status: Confused      LABS AND IMAGING:     Recent Labs   Lab 03/04/25  1127   WBC 6.02   RBC 4.21*   HGB 13.3*   HCT 39.7*   MCV 94.3*   MCH 31.6*   MCHC 33.5   RDW 14.9   *   MPV 13.0*       Recent Labs   Lab 03/04/25  1127   *   K 4.4   CL 96*   CO2 24   BUN 12.0   CREATININE 0.85   CALCIUM 9.2   ALBUMIN 3.8   ALKPHOS 58   ALT 17   AST 33   BILITOT 0.3       Microbiology Results (last 7 days)       ** No results found for the last 168 hours. **             ED US Fast  Bethel Rosa MD     3/4/2025  1:35 PM  ED US Fast    Date/Time: 3/4/2025 11:23 AM    Performed by: Moe  Bethel DUDLEY MD  Authorized by: Jamel Chandra Jr., MD    Indication:  Blunt trauma  Identified Structures:  The pericardium, hepatorenal space, splenorenal   space, and pelvic cul-de-sac were examined  The following findings in the peritoneal, pericardial, and pleural spaces   were obtained:     Pericardial effusion:  Absent    Hepatorenal free fluid:  Absent    Splenorenal free fluid:  Absent    Suprapubic/Pouch of Liban free fluid:  Absent    Impression:  No pathologic free fluid    Charge?:  Yes  CTA Head and Neck (xpd)  Narrative: EXAMINATION:  CTA HEAD AND NECK (XPD)    TECHNIQUE:  Non contrast low dose axial images were obtained through the head.  CT angiogram was performed from the level of the katherine to the top of the head following the IV administration 100 cc of Isovue 370 contrast .  Sagittal and coronal reconstructions and maximum intensity projection reconstructions were performed. Arterial stenosis percentages are based on NASCET measurement criteria.  Additional multiplanar reconstructions were performed on post processed imaging.  Automatic exposure control (AEC) is utilized to reduce patient radiation exposure.    COMPARISON:  None    FINDINGS:  Source images: No intracranial mass lesion is seen.  No hemorrhage is seen.  No infarct is seen.  Some cerebral atrophy seen consistent with patient's age.  Some compensatory ventricular dilatation and periventricular white matter changes seen consistent patient's age.  There is a soft tissue cephalhematoma in the posterior parietal region on the left side..  No cervical mass or lesion is seen.  No abnormal lymphadenopathy seen.  Thyroid appears normal.  Larynx appears normal.  Trachea is midline.  Thoracic inlet appears normal.    Vascular images: The aortic arch is normal in caliber.  Some atherosclerotic plaque is seen.  There is a 2 vessel arch seen which is a normal variant.  The common carotid arteries are widely patent.  There is calcified plaque  seen in the right carotid bulb and proximal internal carotid artery but no hemodynamically significant stenosis is seen.  There is some calcified plaque in the left carotid bulb and proximal internal carotid artery but no hemodynamically significant stenosis is seen.    Both internal carotid arteries are patent distally and seen to level the clinoid and supraclinoid region.  Some calcified plaque is seen at the cavernous and clinoid portions of the internal carotid arteries with areas of stenosis measuring up to 40%.    The MCAs are patent.  The M1 segments, M2 segments M3 segments are patent.    The A1 segments are patent.  Anterior cerebral arteries are widely patent.  No aneurysm or obstruction is seen.    Both vertebral arteries are patent.  They are normal in caliber.  No dissection is seen.  No fibromuscular dysplasia is seen.  Both vertebral arteries perfuse a normal appearing basilar artery.  Posterior cerebral arteries are widely patent with no aneurysm or obstruction is seen.    .  Impression: No evidence of large vessel occlusion seen    Some areas of atherosclerotic plaque as outlined above but no high-grade stenosis seen    Soft tissue cephalohematoma in the scalp in the left posterior parietal region.    Electronically signed by: Sae Pina  Date:    03/04/2025  Time:    12:15  CT Head Without Contrast  Narrative: EXAMINATION:  CT HEAD WITHOUT CONTRAST    CLINICAL HISTORY:  Trauma;  found down.  Occipital hematoma.  Left arm skin tear.  Patient nonverbal at this time.  Last seen normal yesterday.    TECHNIQUE:  Low dose axial CT images obtained throughout the head without intravenous contrast.  Axial, sagittal and coronal reconstructions were performed and interpreted.    DLP: 1162 mGycm    All CT scans at this location are performed using dose optimization techniques as appropriate to a performed exam including the following automated exposure control, adjustment of the mA and/or kV according to  patient size and/or use of iterative reconstruction technique    COMPARISON:  No relevant prior available for comparison.    FINDINGS:  BRAIN: Gray white differentiation is maintained. Moderate periventricular and subcortical white matter changes most compatible with chronic small vessel ischemic disease.  No edema. No mass effect or midline shift.  There is cerebral atrophy.  The posterior fossa and midline structures are unremarkable.    VENTRICLES: Ex vacuo dilatation of the ventricles.    EXTRA-AXIAL: Mild hyperdensity at the falx.  Unable to differentiate trace subdural hemorrhage versus calcifications along the falx.    BONES: Calvarium is intact.    SINUSES AND MASTOIDS: Visualized paranasal sinuses and mastoid air cells are clear.    OTHER: Left occipital scalp hematoma.  Impression: 1. Mild hyperdensity along the falx.  Differential considerations include trace subdural hemorrhage or calcifications.  In the absence of outside prior for comparison, suggest short interval follow-up head CT to reassess.  2. Chronic cerebral atrophy and small-vessel ischemic change  3. Left occipital scalp hematoma.    Electronically signed by: Italia Tang  Date:    03/04/2025  Time:    12:15  CT Cervical Spine Without Contrast  Narrative: EXAMINATION:  CT CERVICAL SPINE WITHOUT CONTRAST    CLINICAL HISTORY:  Trauma;    TECHNIQUE:  Low dose axial images, sagittal and coronal reformations were performed though the cervical spine.  Contrast was not administered. Automatic exposure control is utilized to reduce patient radiation exposure.    COMPARISON:  None    FINDINGS:  The vertebral body heights are well maintained.  The alignment is well maintained.  The bones are  osteoporotic.  There are degenerative changes seen throughout the cervical spine.  No evidence of acute fracture is seen.  No dislocation is seen.  Odontoid lateral masses appear grossly unremarkable.  No soft tissue abnormality is seen.  No retropharyngeal  abnormality is seen.  Visualized portions of the airway appear grossly unremarkable.  Visualized portion of the thoracic inlet appears unremarkable  Impression: Degenerative changes in the cervical spine otherwise unremarkable    Electronically signed by: Sae Pina  Date:    03/04/2025  Time:    12:07  X-Ray Chest 1 View  Narrative: EXAMINATION:  XR CHEST 1 VIEW    CLINICAL HISTORY:  r/o bleeding or hemorrhage;    TECHNIQUE:  Single frontal view of the chest was performed.    COMPARISON:  None available    FINDINGS:  There are chronic appearing lung changes seen bilaterally. No evidence of acute infiltrate is seen. No mass is seen. No lesion is seen. No pleural effusion is seen. The heart appears normal. The aorta is ectatic.  The bones and joints show no acute abnormality.  Impression: No acute process    Electronically signed by: Sae Pina  Date:    03/04/2025  Time:    11:58      ASSESSMENT & PLAN:   Acute metabolic encephalopathy   Scalp hematoma   ? Subdural hemorrhage   Mild hyponatremia     Plan:  Patient awake but confused   No signs of sepsis   May have mild subdural hemorrhage per CT   Will f/u on MRI brain   Started on iv prn haldol     Keep NPO, neuro checks q 4 hrs   Keep on tele     Started iv NS at 125 cc/hr     Ok for 1:1 monitoring     Labs in am       VTE Prophylaxis: SCD    Patient condition:  Guarded    __________________________________________________________________________  INPATIENT LIST OF MEDICATIONS     Scheduled Meds:  Continuous Infusions:   0.9% NaCl   Intravenous Continuous         PRN Meds:.  Current Facility-Administered Medications:     acetaminophen, 650 mg, Oral, Q8H PRN    acetaminophen, 650 mg, Oral, Q4H PRN    bisacodyL, 10 mg, Rectal, Daily PRN    haloperidol lactate, 5 mg, Intravenous, Q6H PRN    labetalol, 10 mg, Intravenous, Q6H PRN    ondansetron, 4 mg, Intravenous, Q8H  PRN        ________________________________________________________________________________      If patient was admitted under observational status it is with my approval/permission.        All diagnosis and differential diagnosis have been reviewed; assessment and plan has been documented; I have personally reviewed the labs and test results that are presently available; I have reviewed the patients medication list; I have reviewed the consulting providers response and recommendations. I have reviewed or attempted to review medical records based upon their availability.    All of the patient and family questions have been addressed and answered. Patient's is agreeable to the above stated plan. I will continue to monitor closely and make adjustments to medical management as needed.    If patient was admitted under observational status it is with my approval/permission.      Francis Valentin MD   03/04/2025

## 2025-03-04 NOTE — ED PROVIDER NOTES
Encounter Date: 3/4/2025    SCRIBE #1 NOTE: I, Patricia Ford, am scribing for, and in the presence of,  Bethel Rosa MD. I have scribed the following portions of the note - Other sections scribed: HPI, ROS, PE.       History   No chief complaint on file.    Patient is a 73 y.o female presents to the ED via EMS as a level one trauma following a fall. EMS reports patient last known well yesterday. EMS reports patient lives alone. EMS reports patient was found down unresponsive in her bedroom today. EMS reports patient has a hematoma to her head. EMS reports patient initially GCS-9. EMS reports administering 200 of ketamine prior to arrival. EMS states unsure if patient is taking blood thinners.     The history is provided by the EMS personnel. The history is limited by the condition of the patient. No  was used.     Review of patient's allergies indicates:  Not on File  No past medical history on file.  No past surgical history on file.  No family history on file.  Social History[1]  Review of Systems   Unable to perform ROS: Acuity of condition       Physical Exam     Initial Vitals   BP Pulse Resp Temp SpO2   03/04/25 1126 03/04/25 1126 03/04/25 1126 03/04/25 1126 03/04/25 1112   (!) 150/75 107 (!) 21 97.4 °F (36.3 °C) (!) 92 %      MAP       --                Physical Exam    Nursing note and vitals reviewed.  Constitutional: [place] appears well-developed and well-nourished. [place] is not diaphoretic.   HENT:   Head: Normocephalic.   Right Ear: External ear normal.   Left Ear: External ear normal.   Nose: Nose normal.   Hematoma to left occiput.   Eyes: Pupils are equal, round, and reactive to light. Right eye exhibits no discharge. Left eye exhibits no discharge.   Rightward gaze deviation.  Pupils 3 mm to 2 mm and reactive bilaterally.   Neck:   C-collar in place.    Cardiovascular:  Normal rate, regular rhythm and normal heart sounds.     Exam reveals no gallop and no friction rub.        No murmur heard.  Pulses:       Radial pulses are 2+ on the right side and 2+ on the left side.   Pulmonary/Chest: Effort normal and breath sounds normal. No respiratory distress. [place] has no wheezes. [place] has no rhonchi. [place] has no rales. [place] exhibits no tenderness.   Equal breath sounds bilaterally.   Abdominal: Abdomen is soft. Bowel sounds are normal. [place] exhibits no distension and no mass. There is no abdominal tenderness. There is no rebound and no guarding.   Musculoskeletal:         General: No edema. Normal range of motion.      Comments: Skin tear to left elbow.  No midline stepoffs or deformities.     Neurological: [place] is alert.   GCS-8.  Spontaneously opening eyes.  Localizing to pain.  Nonverbal.  Left sided facial droop.   Skin: Skin is warm and dry. Capillary refill takes less than 2 seconds.         ED Course   ED US Fast    Date/Time: 3/4/2025 11:23 AM    Performed by: Bethel Rosa MD  Authorized by: Jamel Chandra Jr., MD    Indication:  Blunt trauma  Identified Structures:  The pericardium, hepatorenal space, splenorenal space, and pelvic cul-de-sac were examined  The following findings in the peritoneal, pericardial, and pleural spaces were obtained:     Pericardial effusion:  Absent    Hepatorenal free fluid:  Absent    Splenorenal free fluid:  Absent    Suprapubic/Pouch of Liban free fluid:  Absent    Impression:  No pathologic free fluid    Charge?:  Yes    Labs Reviewed   COMPREHENSIVE METABOLIC PANEL - Abnormal       Result Value    Sodium 132 (*)     Potassium 4.4      Chloride 96 (*)     CO2 24      Glucose 103      Blood Urea Nitrogen 12.0      Creatinine 0.85      Calcium 9.2      Protein Total 6.8      Albumin 3.8      Globulin 3.0      Albumin/Globulin Ratio 1.3      Bilirubin Total 0.3      ALP 58      ALT 17      AST 33      eGFR >60      Anion Gap 12.0      BUN/Creatinine Ratio 14     PROTIME-INR - Abnormal    PT 11.9 (*)     INR 0.9       Narrative:     Protimes are used to monitor anticoagulant agents such as warfarin. PT INR values are based on the current patient normal mean and the MELODIE value for the specific instrument reagent used.  **Routine theraputic target values for the INR are 2.0-3.0**   LACTIC ACID, PLASMA - Abnormal    Lactic Acid Level 2.4 (*)    URINALYSIS, REFLEX TO URINE CULTURE - Abnormal    Color, UA Yellow      Appearance, UA Turbid (*)     Specific Gravity, UA 1.028      pH, UA 7.5      Protein, UA Negative      Glucose, UA Normal      Ketones, UA Negative      Blood, UA Negative      Bilirubin, UA Negative      Urobilinogen, UA Normal      Nitrites, UA Negative      Leukocyte Esterase, UA Negative      RBC, UA 0-5      WBC, UA 0-5      Bacteria, UA Trace      Squamous Epithelial Cells, UA Trace      Mucous, UA Trace (*)    DRUG SCREEN, URINE (BEAKER) - Abnormal    Amphetamines, Urine Negative      Barbiturates, Urine Negative      Benzodiazepine, Urine Positive (*)     Cannabinoids, Urine Negative      Cocaine, Urine Negative      Fentanyl, Urine Negative      MDMA, Urine Negative      Opiates, Urine Negative      Phencyclidine, Urine Negative      pH, Urine 7.5      Specific Gravity, Urine Auto 1.028      Narrative:     Cut off concentrations:    Amphetamines - 1000 ng/ml  Barbiturates - 200 ng/ml  Benzodiazepine - 200 ng/ml  Cannabinoids (THC) - 50 ng/ml  Cocaine - 300 ng/ml  Fentanyl - 1.0 ng/ml  MDMA - 500 ng/ml  Opiates - 300 ng/ml   Phencyclidine (PCP) - 25 ng/ml    Specimen submitted for drug analysis and tested for pH and specific gravity in order to evaluate sample integrity. Suspect tampering if specific gravity is <1.003 and/or pH is not within the range of 4.5 - 8.0  False negatives may result form substances such as bleach added to urine.  False positives may result for the presence of a substance with similar chemical structure to the drug or its metabolite.    This test provides only a PRELIMINARY analytical test  result. A more specific alternate chemical method must be used in order to obtain a confirmed analytical result. Gas chromatography/mass spectrometry (GC/MS) is the preferred confirmatory method. Other chemical confirmation methods are available. Clinical consideration and professional judgement should be applied to any drug of abuse test result, particularly when preliminary positive results are used.    Positive results will be confirmed only at the physicians request. Unconfirmed screening results are to be used only for medical purposes (treatment).        CBC WITH DIFFERENTIAL - Abnormal    WBC 6.02      RBC 4.21 (*)     Hgb 13.3 (*)     Hct 39.7 (*)     MCV 94.3 (*)     MCH 31.6 (*)     MCHC 33.5      RDW 14.9      Platelet 128 (*)     MPV 13.0 (*)     Neut % 52.7      Lymph % 29.2      Mono % 15.8      Eos % 1.7      Basophil % 0.3      Imm Grans % 0.3      Neut # 3.17      Lymph # 1.76      Mono # 0.95      Eos # 0.10      Baso # 0.02      Imm Gran # 0.02      NRBC% 0.0     APTT - Normal    PTT 26.7     ALCOHOL,MEDICAL (ETHANOL) - Normal    Ethanol Level <10.0     CBC W/ AUTO DIFFERENTIAL    Narrative:     The following orders were created for panel order CBC auto differential.  Procedure                               Abnormality         Status                     ---------                               -----------         ------                     CBC with Differential[[id number]]       Abnormal            Final result                 Please view results for these tests on the individual orders.   LACTIC ACID, PLASMA   CK   LIPID PANEL   TSH   POCT GLUCOSE, HAND-HELD DEVICE   POCT GLUCOSE, HAND-HELD DEVICE   TYPE & SCREEN    Group & Rh O POS      Indirect Tiffany GEL NEG      Specimen Outdate 03/07/2025 23:59     ABORH RETYPE          Imaging Results              CTA Head and Neck (xpd) (Final result)  Result time 03/04/25 12:15:46      Final result by Kyle Pina MD (03/04/25 12:15:46)                    Impression:        No evidence of large vessel occlusion seen    Some areas of atherosclerotic plaque as outlined above but no high-grade stenosis seen    Soft tissue cephalohematoma in the scalp in the left posterior parietal region.      Electronically signed by: Sae Pina  Date:    03/04/2025  Time:    12:15               Narrative:    EXAMINATION:  CTA HEAD AND NECK (XPD)    TECHNIQUE:  Non contrast low dose axial images were obtained through the head.  CT angiogram was performed from the level of the katherine to the top of the head following the IV administration 100 cc of Isovue 370 contrast .  Sagittal and coronal reconstructions and maximum intensity projection reconstructions were performed. Arterial stenosis percentages are based on NASCET measurement criteria.  Additional multiplanar reconstructions were performed on post processed imaging.  Automatic exposure control (AEC) is utilized to reduce patient radiation exposure.    COMPARISON:  None    FINDINGS:  Source images: No intracranial mass lesion is seen.  No hemorrhage is seen.  No infarct is seen.  Some cerebral atrophy seen consistent with patient's age.  Some compensatory ventricular dilatation and periventricular white matter changes seen consistent patient's age.  There is a soft tissue cephalhematoma in the posterior parietal region on the left side..  No cervical mass or lesion is seen.  No abnormal lymphadenopathy seen.  Thyroid appears normal.  Larynx appears normal.  Trachea is midline.  Thoracic inlet appears normal.    Vascular images: The aortic arch is normal in caliber.  Some atherosclerotic plaque is seen.  There is a 2 vessel arch seen which is a normal variant.  The common carotid arteries are widely patent.  There is calcified plaque seen in the right carotid bulb and proximal internal carotid artery but no hemodynamically significant stenosis is seen.  There is some calcified plaque in the left carotid bulb and  proximal internal carotid artery but no hemodynamically significant stenosis is seen.    Both internal carotid arteries are patent distally and seen to level the clinoid and supraclinoid region.  Some calcified plaque is seen at the cavernous and clinoid portions of the internal carotid arteries with areas of stenosis measuring up to 40%.    The MCAs are patent.  The M1 segments, M2 segments M3 segments are patent.    The A1 segments are patent.  Anterior cerebral arteries are widely patent.  No aneurysm or obstruction is seen.    Both vertebral arteries are patent.  They are normal in caliber.  No dissection is seen.  No fibromuscular dysplasia is seen.  Both vertebral arteries perfuse a normal appearing basilar artery.  Posterior cerebral arteries are widely patent with no aneurysm or obstruction is seen.    .                                       CT Cervical Spine Without Contrast (Final result)  Result time 03/04/25 12:07:05      Final result by Kyle Pina MD (03/04/25 12:07:05)                   Impression:      Degenerative changes in the cervical spine otherwise unremarkable      Electronically signed by: Sae Pina  Date:    03/04/2025  Time:    12:07               Narrative:    EXAMINATION:  CT CERVICAL SPINE WITHOUT CONTRAST    CLINICAL HISTORY:  Trauma;    TECHNIQUE:  Low dose axial images, sagittal and coronal reformations were performed though the cervical spine.  Contrast was not administered. Automatic exposure control is utilized to reduce patient radiation exposure.    COMPARISON:  None    FINDINGS:  The vertebral body heights are well maintained.  The alignment is well maintained.  The bones are  osteoporotic.  There are degenerative changes seen throughout the cervical spine.  No evidence of acute fracture is seen.  No dislocation is seen.  Odontoid lateral masses appear grossly unremarkable.  No soft tissue abnormality is seen.  No retropharyngeal abnormality is seen.  Visualized  portions of the airway appear grossly unremarkable.  Visualized portion of the thoracic inlet appears unremarkable                                       CT Head Without Contrast (Final result)  Result time 03/04/25 12:15:08      Final result by Italia Tang MD (03/04/25 12:15:08)                   Impression:      1. Mild hyperdensity along the falx.  Differential considerations include trace subdural hemorrhage or calcifications.  In the absence of outside prior for comparison, suggest short interval follow-up head CT to reassess.  2. Chronic cerebral atrophy and small-vessel ischemic change  3. Left occipital scalp hematoma.      Electronically signed by: Italia Tang  Date:    03/04/2025  Time:    12:15               Narrative:    EXAMINATION:  CT HEAD WITHOUT CONTRAST    CLINICAL HISTORY:  Trauma;  found down.  Occipital hematoma.  Left arm skin tear.  Patient nonverbal at this time.  Last seen normal yesterday.    TECHNIQUE:  Low dose axial CT images obtained throughout the head without intravenous contrast.  Axial, sagittal and coronal reconstructions were performed and interpreted.    DLP: 1162 mGycm    All CT scans at this location are performed using dose optimization techniques as appropriate to a performed exam including the following automated exposure control, adjustment of the mA and/or kV according to patient size and/or use of iterative reconstruction technique    COMPARISON:  No relevant prior available for comparison.    FINDINGS:  BRAIN: Gray white differentiation is maintained. Moderate periventricular and subcortical white matter changes most compatible with chronic small vessel ischemic disease.  No edema. No mass effect or midline shift.  There is cerebral atrophy.  The posterior fossa and midline structures are unremarkable.    VENTRICLES: Ex vacuo dilatation of the ventricles.    EXTRA-AXIAL: Mild hyperdensity at the falx.  Unable to differentiate trace subdural hemorrhage  versus calcifications along the falx.    BONES: Calvarium is intact.    SINUSES AND MASTOIDS: Visualized paranasal sinuses and mastoid air cells are clear.    OTHER: Left occipital scalp hematoma.                                       X-Ray Chest 1 View (Final result)  Result time 03/04/25 11:58:22      Final result by Kyle Pina MD (03/04/25 11:58:22)                   Impression:      No acute process      Electronically signed by: Sae Pina  Date:    03/04/2025  Time:    11:58               Narrative:    EXAMINATION:  XR CHEST 1 VIEW    CLINICAL HISTORY:  r/o bleeding or hemorrhage;    TECHNIQUE:  Single frontal view of the chest was performed.    COMPARISON:  None available    FINDINGS:  There are chronic appearing lung changes seen bilaterally. No evidence of acute infiltrate is seen. No mass is seen. No lesion is seen. No pleural effusion is seen. The heart appears normal. The aorta is ectatic.  The bones and joints show no acute abnormality.                                       ED US Fast (Final result)  Result time 03/04/25 13:35:19      Final result by Bethel Rosa MD (03/04/25 13:35:19)                   Narrative:    Bethel Rosa MD     3/4/2025  1:35 PM  ED US Fast    Date/Time: 3/4/2025 11:23 AM    Performed by: Bethel Rosa MD  Authorized by: Jamel Chandra Jr., MD    Indication:  Blunt trauma  Identified Structures:  The pericardium, hepatorenal space, splenorenal   space, and pelvic cul-de-sac were examined  The following findings in the peritoneal, pericardial, and pleural spaces   were obtained:     Pericardial effusion:  Absent    Hepatorenal free fluid:  Absent    Splenorenal free fluid:  Absent    Suprapubic/Pouch of Liban free fluid:  Absent    Impression:  No pathologic free fluid    Charge?:  Yes                                     Medications   ondansetron injection 4 mg (has no administration in time range)   acetaminophen tablet 650 mg (has no  administration in time range)   acetaminophen tablet 650 mg (has no administration in time range)   labetaloL injection 10 mg (has no administration in time range)   bisacodyL suppository 10 mg (has no administration in time range)   0.9% NaCl infusion (has no administration in time range)   haloperidol lactate injection 5 mg (has no administration in time range)   0.9% NaCl infusion (999 mL/hr Intravenous New Bag 3/4/25 1134)   iohexoL (OMNIPAQUE 350) injection 100 mL (100 mLs Intravenous Given 3/4/25 1204)   haloperidol lactate injection 5 mg (5 mg Intramuscular Given 3/4/25 1301)   diphenhydrAMINE injection 25 mg (25 mg Intravenous Given 3/4/25 1300)     Medical Decision Making  The differential diagnosis includes, but is not limited to, abrasion, contusion, fracture, traumatic ICH, TBI, concussion, spinal injury, fracture, pneumothorax, hemothorax, intrathoracic injury, intraabdominal injury, hemorrhage, laceration     Patient is more awake and alert at this time.  Does not know her name but does not know time or place or situation.  Does not appear to be uncomfortable but somewhat agitated.  CT head unrevealing as is CTA.  Have conferred with Trauma surgery they do not believe the patient has a head bleed only calcifications along the falx.  Given altered mental status we will admit for further evaluation.  Discussed with Josefina on-call for hospitalist.  Will admit.  Care to be transferred.    Amount and/or Complexity of Data Reviewed  Independent Historian: EMS  External Data Reviewed: notes.     Details: See ED course  Labs: ordered. Decision-making details documented in ED Course.  Radiology: ordered and independent interpretation performed. Decision-making details documented in ED Course.    Risk  Prescription drug management.  Decision regarding hospitalization.            Scribe Attestation:   Scribe #1: I performed the above scribed service and the documentation accurately describes the services I  "performed. I attest to the accuracy of the note.    Attending Attestation:           Physician Attestation for Scribe:  Physician Attestation Statement for Scribe #1: I, Bethle Rosa MD, reviewed documentation, as scribed by Patricia Ford in my presence, and it is both accurate and complete.             ED Course as of 03/04/25 1509   Tue Mar 04, 2025   1201 WBC: 6.02 [MM]   1201 Hemoglobin(!): 13.3 [MM]   1207 Lactic Acid Level(!): 2.4 [MM]   1245 On re-evaluation patient is more awake and alert reports her name is "Ana Maria" however she does not remember today's events.  Does not know year month.  Reportedly oriented.  Very confused. [MM]   1251 Spoke with trauma surgery [MB]   1253 [facility] medicine [MB]   1334 Sodium(!): 132 [MM]   1334 Potassium: 4.4 [MM]   1334 Chloride(!): 96 [MM]   1334 CO2: 24 [MM]   1334 Glucose: 103 [MM]   1334 BUN: 12.0 [MM]   1334 Creatinine: 0.85 [MM]      ED Course User Index  [MB] Patricia Ford  [MM] Bethel Rosa MD                           Clinical Impression:  Final diagnoses:  [R41.82] AMS (altered mental status) (Primary)  [W19.XXXA] Fall, initial encounter  [S00.03XA] Scalp hematoma, initial encounter          ED Disposition Condition    Admit Stable                  Bethel Rosa MD  03/04/25 1335         [1]         Bethel Rosa MD  03/04/25 1509       Bethel Rosa MD  03/04/25 1515    "

## 2025-03-04 NOTE — NURSING
"Son, Saumya Callejas, present in the room. Identifies patient as "Ana Maria Callejas,  1950". History collected by son and significant other Carmen Silva. Son states that he is the only child. He will be going to get her identification cards and medications. I did have Saumya speak with MRI to get the screening done. Per son, she has a history of Lupus, labile blood pressure with issues of highs and lows recently; medications being adjusted by PCP. Patient had a fall in November where she was brought to [facility] and was diagnosed with a "brain bleed" where she had 5-6 staples, and was discharged with home health and therapies, which have since signed off. She has been having dizzy spells since November, as reported by the daughter in law. 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     Contact information for Saumya Callejas (son) is [phone]. Contact information for Carmen Silva (Son's significant other) is [phone].      " Anesthesia Volume In Cc: 6

## 2025-03-04 NOTE — ED NOTES
Pt log rolled maintaining c-spine immobilization. No stepoffs, tenderness, or deformities noted upon palpation MD Rom. No neuro changes noted.

## 2025-03-04 NOTE — ED NOTES
Assumed care of pt. At this time.. pt. Arrives as trauma from CT scan. Noted large hematoma to left occipital and skin tear to left lateral arm. Nonstick dressing applied to left arm at this time. No noted active bleeding. Pt. Nonverbal at this time but moans and does turn her head towards left when spoken to. But otherwise appears to have right sided gaze at this time. Withdraws to bilat upper extremities with manipulation.  [facility] nurse verbalizes pt. Found down at home last seen normal yesterday by family and received 200 ketamine in route. Original report pt. Was GCS 9. Upon arrival. Will continue to monitor

## 2025-03-04 NOTE — ED NOTES
Pt. Still verbalizing answers at this time. Gcs 14. Noted less episodes to get out of bed. Will continue to closely monitor

## 2025-03-05 PROBLEM — R29.6 REPEATED FALLS: Status: ACTIVE | Noted: 2025-03-05

## 2025-03-05 LAB
ALBUMIN SERPL-MCNC: 3.6 G/DL (ref 3.4–4.8)
ALBUMIN/GLOB SERPL: 1.2 RATIO (ref 1.1–2)
ALP SERPL-CCNC: 52 UNIT/L (ref 40–150)
ALT SERPL-CCNC: 21 UNIT/L (ref 0–55)
AMMONIA PLAS-MSCNC: 24.8 UMOL/L (ref 18–72)
ANION GAP SERPL CALC-SCNC: 12 MEQ/L
APTT PPP: 27.5 SECONDS (ref 23.2–33.7)
AST SERPL-CCNC: 39 UNIT/L (ref 5–34)
BASOPHILS # BLD AUTO: 0.01 X10(3)/MCL
BASOPHILS NFR BLD AUTO: 0.2 %
BILIRUB SERPL-MCNC: 0.5 MG/DL
BUN SERPL-MCNC: 6.8 MG/DL (ref 9.8–20.1)
CALCIUM SERPL-MCNC: 8.4 MG/DL (ref 8.4–10.2)
CHLORIDE SERPL-SCNC: 100 MMOL/L (ref 98–107)
CO2 SERPL-SCNC: 21 MMOL/L (ref 23–31)
CREAT SERPL-MCNC: 0.75 MG/DL (ref 0.55–1.02)
CREAT/UREA NIT SERPL: 9
EOSINOPHIL # BLD AUTO: 0 X10(3)/MCL (ref 0–0.9)
EOSINOPHIL NFR BLD AUTO: 0 %
ERYTHROCYTE [DISTWIDTH] IN BLOOD BY AUTOMATED COUNT: 14.4 % (ref 11.5–17)
EST. AVERAGE GLUCOSE BLD GHB EST-MCNC: 102.5 MG/DL
GFR SERPLBLD CREATININE-BSD FMLA CKD-EPI: >60 ML/MIN/1.73/M2
GLOBULIN SER-MCNC: 3 GM/DL (ref 2.4–3.5)
GLUCOSE SERPL-MCNC: 107 MG/DL (ref 82–115)
HBA1C MFR BLD: 5.2 %
HCT VFR BLD AUTO: 36.8 % (ref 37–47)
HGB BLD-MCNC: 12.5 G/DL (ref 12–16)
IMM GRANULOCYTES # BLD AUTO: 0.01 X10(3)/MCL (ref 0–0.04)
IMM GRANULOCYTES NFR BLD AUTO: 0.2 %
INR PPP: 0.9
LYMPHOCYTES # BLD AUTO: 1.17 X10(3)/MCL (ref 0.6–4.6)
LYMPHOCYTES NFR BLD AUTO: 25.8 %
MAGNESIUM SERPL-MCNC: 2.1 MG/DL (ref 1.6–2.6)
MCH RBC QN AUTO: 31.4 PG (ref 27–31)
MCHC RBC AUTO-ENTMCNC: 34 G/DL (ref 33–36)
MCV RBC AUTO: 92.5 FL (ref 80–94)
MONOCYTES # BLD AUTO: 0.65 X10(3)/MCL (ref 0.1–1.3)
MONOCYTES NFR BLD AUTO: 14.3 %
NEUTROPHILS # BLD AUTO: 2.69 X10(3)/MCL (ref 2.1–9.2)
NEUTROPHILS NFR BLD AUTO: 59.5 %
NRBC BLD AUTO-RTO: 0 %
PHOSPHATE SERPL-MCNC: 2.8 MG/DL (ref 2.3–4.7)
PLATELET # BLD AUTO: 116 X10(3)/MCL (ref 130–400)
PLATELETS.RETICULATED NFR BLD AUTO: 18.6 % (ref 0.9–11.2)
PMV BLD AUTO: 13.6 FL (ref 7.4–10.4)
POTASSIUM SERPL-SCNC: 3.5 MMOL/L (ref 3.5–5.1)
PROT SERPL-MCNC: 6.6 GM/DL (ref 5.8–7.6)
PROTHROMBIN TIME: 11.6 SECONDS (ref 12.5–14.5)
RBC # BLD AUTO: 3.98 X10(6)/MCL (ref 4.2–5.4)
SODIUM SERPL-SCNC: 133 MMOL/L (ref 136–145)
TROPONIN I SERPL-MCNC: 0.03 NG/ML (ref 0–0.04)
WBC # BLD AUTO: 4.53 X10(3)/MCL (ref 4.5–11.5)

## 2025-03-05 PROCEDURE — 85025 COMPLETE CBC W/AUTO DIFF WBC: CPT | Performed by: NURSE PRACTITIONER

## 2025-03-05 PROCEDURE — 84100 ASSAY OF PHOSPHORUS: CPT | Performed by: NURSE PRACTITIONER

## 2025-03-05 PROCEDURE — 82140 ASSAY OF AMMONIA: CPT

## 2025-03-05 PROCEDURE — 85610 PROTHROMBIN TIME: CPT | Performed by: NURSE PRACTITIONER

## 2025-03-05 PROCEDURE — 97162 PT EVAL MOD COMPLEX 30 MIN: CPT

## 2025-03-05 PROCEDURE — 92610 EVALUATE SWALLOWING FUNCTION: CPT

## 2025-03-05 PROCEDURE — 36415 COLL VENOUS BLD VENIPUNCTURE: CPT | Mod: XB

## 2025-03-05 PROCEDURE — 83735 ASSAY OF MAGNESIUM: CPT | Performed by: NURSE PRACTITIONER

## 2025-03-05 PROCEDURE — 21400001 HC TELEMETRY ROOM

## 2025-03-05 PROCEDURE — 84484 ASSAY OF TROPONIN QUANT: CPT | Performed by: NURSE PRACTITIONER

## 2025-03-05 PROCEDURE — 97166 OT EVAL MOD COMPLEX 45 MIN: CPT

## 2025-03-05 PROCEDURE — 85730 THROMBOPLASTIN TIME PARTIAL: CPT | Performed by: NURSE PRACTITIONER

## 2025-03-05 PROCEDURE — 36415 COLL VENOUS BLD VENIPUNCTURE: CPT | Performed by: NURSE PRACTITIONER

## 2025-03-05 PROCEDURE — 83036 HEMOGLOBIN GLYCOSYLATED A1C: CPT | Performed by: NURSE PRACTITIONER

## 2025-03-05 PROCEDURE — 25000003 PHARM REV CODE 250: Performed by: HOSPITALIST

## 2025-03-05 PROCEDURE — 80053 COMPREHEN METABOLIC PANEL: CPT | Performed by: NURSE PRACTITIONER

## 2025-03-05 PROCEDURE — 63600175 PHARM REV CODE 636 W HCPCS: Performed by: HOSPITALIST

## 2025-03-05 RX ORDER — AMLODIPINE BESYLATE 5 MG/1
10 TABLET ORAL DAILY
Status: DISCONTINUED | OUTPATIENT
Start: 2025-03-05 | End: 2025-03-08

## 2025-03-05 RX ORDER — TALC
9 POWDER (GRAM) TOPICAL NIGHTLY PRN
Status: DISCONTINUED | OUTPATIENT
Start: 2025-03-06 | End: 2025-03-18 | Stop reason: HOSPADM

## 2025-03-05 RX ORDER — MYCOPHENOLATE MOFETIL 250 MG/1
500 CAPSULE ORAL 2 TIMES DAILY
Status: DISCONTINUED | OUTPATIENT
Start: 2025-03-05 | End: 2025-03-11

## 2025-03-05 RX ORDER — ACETAMINOPHEN 500 MG
1000 TABLET ORAL EVERY 6 HOURS PRN
Status: DISCONTINUED | OUTPATIENT
Start: 2025-03-06 | End: 2025-03-18 | Stop reason: HOSPADM

## 2025-03-05 RX ORDER — PREDNISONE 5 MG/1
5 TABLET ORAL DAILY
Status: DISCONTINUED | OUTPATIENT
Start: 2025-03-05 | End: 2025-03-18 | Stop reason: HOSPADM

## 2025-03-05 RX ADMIN — ACETAMINOPHEN 1000 MG: 500 TABLET ORAL at 11:03

## 2025-03-05 RX ADMIN — PREDNISONE 5 MG: 5 TABLET ORAL at 08:03

## 2025-03-05 RX ADMIN — AMLODIPINE BESYLATE 10 MG: 5 TABLET ORAL at 08:03

## 2025-03-05 RX ADMIN — Medication 9 MG: at 11:03

## 2025-03-05 RX ADMIN — MYCOPHENOLATE MOFETIL 500 MG: 250 CAPSULE ORAL at 08:03

## 2025-03-05 RX ADMIN — LEVOTHYROXINE SODIUM 75 MCG: 0.05 TABLET ORAL at 08:03

## 2025-03-05 NOTE — PLAN OF CARE
Problem: Occupational Therapy  Goal: Occupational Therapy Goal  Description: Goals to be met by 3/5/2025:    Pt will complete grooming standing at sink with LRAD with SBA.  Pt will complete UB dressing with SBA.  Pt will complete LB dressing with SBA using LRAD.  Pt will complete toileting with SBA using LRAD.  Pt will complete functional mobility to/from toilet and toilet transfer with SBA using LRAD.    Outcome: Progressing

## 2025-03-05 NOTE — HPI
73-year-old female was admitted on 3/4 following a fall where she lost consciousness.  Patient's family at bedside reports recurrent falls over the last several months with the worst falls occurring last November and on this admission.  Family reports intracranial bleed was as a result the fall last November and she lost consciousness during that episode as well.  Patient's daughter-in-law at bedside reports falls prior to November for likely due to orthostasis.  Patient's family reports lives alone, ambulates without assistance, and performs ADLs independently at baseline.    CT head without revealed possible trace SDH.  CTA head/neck unrevealing for flow-limiting stenosis, LVO, or aneurysms.    MRI brain w/o unrevealing for acute intracranial abnormalities.  Labs revealed mild hyponatremia, otherwise unremarkable.  TTE revealed LVEF 60-65% and otherwise unrevealing.

## 2025-03-05 NOTE — PLAN OF CARE
Problem: Physical Therapy  Goal: Physical Therapy Goal  Description: Goals to be met by: 2025     Patient will increase functional independence with mobility by performin. Supine to sit with Stand-by Assistance  2. Sit to stand transfer with Stand-by Assistance  3. Gait  x 150 feet with Stand-by Assistance using Rolling Walker or LRAD.     Outcome: Progressing

## 2025-03-05 NOTE — PT/OT/SLP EVAL
Physical Therapy Evaluation    Patient Name:  Ana Maria Callejas   MRN:  [id number]    Recommendations:     Discharge therapy intensity: Moderate Intensity Therapy   Discharge Equipment Recommendations: walker, rolling   Barriers to discharge: Impaired mobility and Ongoing medical needs    Assessment:     Ana Maria Callejas is a 74 y.o. female admitted with a medical diagnosis of acute metabolic encephalopathy, scalp hematoma, mild SDH, milk hyponatremia.  She presents with the following impairments/functional limitations: weakness, impaired endurance, impaired functional mobility, gait instability, impaired balance, decreased safety awareness.    Pt pleasantly confused and agreeable. Strength WNL, however pt demonstrated significant orthostatic hypotension with standing (dropped to  67/56 with associated dizziness). Pt assisted back to chair and RN notified. Recommending moderate intensity therapy at this time. Pt is certainly not safe to d/c home alone.     Rehab Prognosis: Good; patient would benefit from acute skilled PT services to address these deficits and reach maximum level of function.    Recent Surgery: * No surgery found *      Plan:     During this hospitalization, patient would benefit from acute PT services 5 x/week to address the identified rehab impairments via gait training, therapeutic activities, therapeutic exercises and progress toward the following goals:    Plan of Care Expires:  04/05/25    Subjective     Chief Complaint: feeling dizzy  Patient/Family Comments/goals: to get better  Pain/Comfort:  Pain Rating 1: 0/10    Patients cultural, spiritual, Sikhism conflicts given the current situation: no    Living Environment:  Lives alone.   Prior to admission, patients level of function was independent.  Equipment used at home: none.  DME owned (not currently used):  shower chair .  Upon discharge, patient will have assistance from son (sitter in room stated that the son may want to take her home with  him).    Objective:     Communicated with nurse prior to session.  Patient found up in chair with telemetry  upon PT entry to room.    General Precautions: Standard, fall, other (see comments) (orthostatic BP)  Orthopedic Precautions:    Braces: N/A  Respiratory Status: Room air  Blood Pressure:    - 97/61 sitting in chair at rest   -128/72 after walking and then returning to sitting    -67/56 standing      Exams:  Sensation:    -       Intact  RLE ROM: WNL  RLE Strength: WNL  LLE ROM: WNL  LLE Strength: WNL  Skin integrity: Visible skin intact      Functional Mobility:  Bed Mobility:     Scooting: contact guard assistance  Transfers:     Sit to Stand:  contact guard assistance with rolling walker  Gait: 25ft with RW and CGA, Min a when pt became dizzy and less responsive. Assisted to chair.       AM-PAC 6 CLICK MOBILITY  Total Score:16       Treatment & Education:    Patient provided with verbal education education regarding PT role/goals/POC.  Understanding was verbalized.     Patient left up in chair with all lines intact, call button in reach, nurse notified, and sitter present.    GOALS:   Multidisciplinary Problems       Physical Therapy Goals          Problem: Physical Therapy    Goal Priority Disciplines Outcome Interventions   Physical Therapy Goal     PT, PT/OT Progressing    Description: Goals to be met by: 2025     Patient will increase functional independence with mobility by performin. Supine to sit with Stand-by Assistance  2. Sit to stand transfer with Stand-by Assistance  3. Gait  x 150 feet with Stand-by Assistance using Rolling Walker or LRAD.                          History:     No past medical history on file.    No past surgical history on file.    Time Tracking:     PT Received On: 25  PT Start Time: 1347     PT Stop Time: 1401  PT Total Time (min): 14 min     Billable Minutes: Evaluation 14      2025

## 2025-03-05 NOTE — ASSESSMENT & PLAN NOTE
With last two falls involving +LOC  CT head w/o: possible trace SDH per radiology report  Await EEG results  Consideration for placement in facility following discharge  Fall precautions

## 2025-03-05 NOTE — PT/OT/SLP EVAL
Occupational Therapy  Evaluation    Name: Ana Maria Callejas  MRN: [id number]  Recent Surgery: * No surgery found *      Recommendations:     Discharge therapy intensity: Moderate Intensity Therapy   Discharge Equipment Recommendations:  to be determined by next level of care  Barriers to discharge:  Decreased caregiver support, Other (Comment) (ongoing medical needs; impaired ability to complete ADLs compared to baseline abilities)    Assessment:     Ana Maria Callejas is a 74 y.o. female with a medical diagnosis of acute metabolic encephalopathy, scalp hematoma, mild SDH, milk hyponatremia.  She presents with the following performance deficits affecting function: weakness, impaired endurance, decreased upper extremity function, impaired self care skills, impaired functional mobility, gait instability, impaired cardiopulmonary response to activity.     Pt tolerated eval well. Oriented to self, time, and situation. Pt required CGA to perform sit to stand. Able to ambulate ~25 ft within room but became dizzy and less responsive, assisted back to chair. BP recorded below and reported to nursing. SBA for LB dressing in seated position, set up A for feeding. Pt was living alone and independent with ADLs prior to admit. Pt remains a fall risk and unsafe to dc home alone at this time. Recommend moderate-intensity therapy upon dc.     Rehab Prognosis: Good; patient would benefit from acute skilled OT services to address these deficits and reach maximum level of function.       Plan:     Patient to be seen 5 x/week to address the above listed problems via self-care/home management, therapeutic activities, therapeutic exercises, neuromuscular re-education  Plan of Care Expires: 04/09/25  Plan of Care Reviewed with: patient    Subjective     Chief Complaint: dizziness  Patient/Family Comments/goals: return home    Occupational Profile:  Living Environment: pt currently lives alone in [facility], tub/shower with shower chair and grab  bars  Previous level of function: reports independence with functional mobility and ADLs at baseline  Roles and Routines: mother, friend  Equipment Used at Home: shower chair, grab bar  Assistance upon Discharge: unknown at this time; pt reports she may be going to her son's home but he works offshore (14 days on/off)    Pain/Comfort:  Pain Rating 1: 0/10    Patients cultural, spiritual, Mormonism conflicts given the current situation: no    Objective:     OT communicated with Elba escobar prior to session.      Patient was found up in chair with peripheral IV, pulse ox (continuous), telemetry, Other (comments) (1:1 sitter) upon OT entry to room.    General Precautions: Standard, fall  Orthopedic Precautions: N/A  Braces: N/A    Vital Signs: Blood Pressure: 97/61 seated in chair at rest; 128/72 after walking then returned to sitting; 67/56 in standing position. Reported to nursing.     Functional Mobility/Transfers:  Patient completed Sit <> Stand Transfer with contact guard assistance  with  rolling walker   Functional Mobility: ambulated 25 ft with RW and CGA. Pt became dizzy and assisted back to chair with MIN A. BP taken and recorded above.    Activities of Daily Living:  Feeding:  set up A    Lower Body Dressing: supervision don/doff socks  Toileting: sitter reports pt was able to perform toileting but demonstrated impaired sitting balance while sitting on toilet earlier in the day      AMPAC 6 Click ADL:  AMPAC Total Score: 22    Functional Cognition:  Orientation: oriented to Person, Time, and Situation  Safety Awareness: Impaired.      Visual Perceptual Skills:  No blurriness or double vision  Finger-nose test WNL bilaterally     Upper Extremity Function:  Right Upper Extremity:   3+/5 shoulder, elbow, and  strength    Left Upper Extremity:  3+/5 shoulder, elbow, and  strength    Balance:   CGA static standing    Therapeutic Positioning  Risk for acquired pressure injuries is decreased due to  ability to get to BSC/toilet with assist.    OT interventions performed during the course of today's session:   Education was provided on benefits of and recommendations for therapeutic positioning    Skin assessment: all bony prominences were assessed    Findings: known area of altered skin integrity at bruising at occipital region    OT recommendations for therapeutic positioning throughout hospitalization:   Follow [facility] Pressure Injury Prevention Protocol    Patient Education:  Patient provided with verbal education education regarding OT role/goals/POC, fall prevention, safety awareness, and Discharge/DME recommendations.  Understanding was verbalized.     Patient left up in chair with  nursing/Elba notified and sitter present.    GOALS:   Multidisciplinary Problems       Occupational Therapy Goals          Problem: Occupational Therapy    Goal Priority Disciplines Outcome Interventions   Occupational Therapy Goal     OT, PT/OT Progressing    Description: Goals to be met by 3/5/2025:    Pt will complete grooming standing at sink with LRAD with SBA.  Pt will complete UB dressing with SBA.  Pt will complete LB dressing with SBA using LRAD.  Pt will complete toileting with SBA using LRAD.  Pt will complete functional mobility to/from toilet and toilet transfer with SBA using LRAD.                         History:     No past medical history on file.    No past surgical history on file.    Time Tracking:     OT Date of Treatment:    OT Start Time: 1348  OT Stop Time: 1408  OT Total Time (min): 20 min    Billable Minutes:Evaluation moderate    3/5/2025

## 2025-03-05 NOTE — PLAN OF CARE
Problem: Adult Inpatient Plan of Care  Goal: Plan of Care Review  Outcome: Progressing  Goal: Patient-Specific Goal (Individualized)  Outcome: Progressing  Goal: Absence of Hospital-Acquired Illness or Injury  Outcome: Progressing  Goal: Optimal Comfort and Wellbeing  Outcome: Progressing  Goal: Readiness for Transition of Care  Outcome: Progressing     Problem: Infection  Goal: Absence of Infection Signs and Symptoms  Outcome: Progressing     Problem: Stroke, Ischemic (Includes Transient Ischemic Attack)  Goal: Optimal Coping  Outcome: Progressing  Goal: Effective Bowel Elimination  Outcome: Progressing  Goal: Optimal Cerebral Tissue Perfusion  Outcome: Progressing  Goal: Optimal Cognitive Function  Outcome: Progressing  Goal: Improved Communication Skills  Outcome: Progressing  Goal: Optimal Functional Ability  Outcome: Progressing  Goal: Optimal Nutrition Intake  Outcome: Progressing  Goal: Effective Oxygenation and Ventilation  Outcome: Progressing  Goal: Improved Sensorimotor Function  Outcome: Progressing  Goal: Safe and Effective Swallow  Outcome: Progressing  Goal: Effective Urinary Elimination  Outcome: Progressing     Problem: Skin Injury Risk Increased  Goal: Skin Health and Integrity  Outcome: Progressing     Problem: Fall Injury Risk  Goal: Absence of Fall and Fall-Related Injury  Outcome: Progressing     Problem: Restraint, Nonviolent  Goal: Absence of Harm or Injury  Outcome: Progressing

## 2025-03-05 NOTE — CONSULTS
[facility] - Neurology  Neurology  Consult Note    Patient Name: Ana Maria Callejas  MRN: [id number]  Admission Date: 3/4/2025  Hospital Length of Stay: 1 days  Code Status: Full Code   Attending Provider: Venkat Kaplan MD   Consulting Provider: JOYCE Watkins  Primary Care Physician: No primary care provider on file.  Principal Problem:<principal problem not specified>    Inpatient Consult to Neurology Services (General Neurology)  Consult performed by: Dona Ludwig AGACNP-BC  Consult ordered by: Venkat Kapaln MD         Subjective:     Chief Complaint:       HPI:   73-year-old female was admitted on 3/4 following a fall where she lost consciousness.  Patient's family at bedside reports recurrent falls over the last several months with the worst falls occurring last November and on this admission.  Family reports intracranial bleed was as a result the fall last November and she lost consciousness during that episode as well.  Patient's daughter-in-law at bedside reports falls prior to November for likely due to orthostasis.  Patient's family reports lives alone, ambulates without assistance, and performs ADLs independently at baseline.    CT head without revealed possible trace SDH.  CTA head/neck unrevealing for flow-limiting stenosis, LVO, or aneurysms.    MRI brain w/o unrevealing for acute intracranial abnormalities.  Labs revealed mild hyponatremia, otherwise unremarkable.  TTE revealed LVEF 60-65% and otherwise unrevealing.     No past medical history on file.    No past surgical history on file.    Review of patient's allergies indicates:  Not on File    Current Neurological Medications:     No current facility-administered medications on file prior to encounter.     Current Outpatient Medications on File Prior to Encounter   Medication Sig    amLODIPine (NORVASC) 10 MG tablet Take 10 mg by mouth once daily.    estrogens, conjugated, (PREMARIN) 0.3 MG tablet Take 0.3 mg by mouth once  daily.    levothyroxine (SYNTHROID) 50 MCG tablet Take 50 mcg by mouth before breakfast. Take 1 tablet every other day alternate with 75mcg dose    levothyroxine (SYNTHROID) 75 MCG tablet Take 75 mcg by mouth before breakfast.    mycophenolate (CELLCEPT) 500 mg Tab Take 500 mg by mouth 2 (two) times daily.    predniSONE (DELTASONE) 5 MG tablet Take 5 mg by mouth once daily.    trospium (SANCTURA) 20 mg Tab tablet Take 20 mg by mouth 2 (two) times daily.     Family History    None       Tobacco Use    Smoking status: Not on file    Smokeless tobacco: Not on file   Substance and Sexual Activity    Alcohol use: Not on file    Drug use: Not on file    Sexual activity: Not on file     Review of Systems  A 14pt ros was reviewed & is negative unless o/w documented in the hpi    Objective:     Vital Signs (Most Recent):  Temp: 98.5 °F (36.9 °C) (03/05/25 1109)  Pulse: 91 (03/05/25 1109)  Resp: 16 (03/05/25 0341)  BP: 112/68 (03/05/25 1109)  SpO2: 95 % (03/05/25 1109) Vital Signs (24h Range):  Temp:  [97.5 °F (36.4 °C)-100.6 °F (38.1 °C)] 98.5 °F (36.9 °C)  Pulse:  [] 91  Resp:  [16-26] 16  SpO2:  [95 %-98 %] 95 %  BP: ()/(62-71) 112/68     Weight: 52.2 kg (115 lb)  Body mass index is 18.01 kg/m².     Physical Exam  Vitals reviewed.   Constitutional:       General: She is awake.      Appearance: Normal appearance.   HENT:      Head: Normocephalic and atraumatic.      Nose: Nose normal.      Mouth/Throat:      Mouth: Mucous membranes are moist.      Pharynx: Oropharynx is clear.   Eyes:      Extraocular Movements: Extraocular movements intact and EOM normal.      Pupils: Pupils are equal, round, and reactive to light.   Pulmonary:      Effort: Pulmonary effort is normal.   Musculoskeletal:         General: Normal range of motion.      Cervical back: Normal range of motion.   Skin:     General: Skin is warm and dry.   Neurological:      Mental Status: She is alert and oriented to person, place, and time.    Psychiatric:         Mood and Affect: Mood normal.         Speech: Speech normal.         Behavior: Behavior normal. Behavior is cooperative.          NEUROLOGICAL EXAMINATION:     MENTAL STATUS   Oriented to person, place, and time.   Follows 3 step commands.   Attention: normal. Concentration: normal.   Speech: speech is normal   Level of consciousness: alert  Knowledge: good.   Normal comprehension.     CRANIAL NERVES     CN II   Visual fields full to confrontation.     CN III, IV, VI   Pupils are equal, round, and reactive to light.  Extraocular motions are normal.   Nystagmus: none   Conjugate gaze: present    CN V   Facial sensation intact.     CN VII   Facial expression full, symmetric.     MOTOR EXAM        Moves all extremities, no focal weakness noted     REFLEXES     Reflexes   Right plantar: normal  Left plantar: normal  Right ankle clonus: absent  Left ankle clonus: absent    SENSORY EXAM   Light touch normal.       Significant Labs:   Recent Lab Results  (Last 5 results in the past 24 hours)        03/05/25  0924   03/05/25  0636   03/05/25  0138   03/04/25  1503   03/04/25  1448        Immature Platelet Fraction   18.6             Albumin/Globulin Ratio     1.2           A2C EF         57       A4C EF         61       ABO and RH       O POS         Albumin     3.6           ALP     52           ALT     21           Ammonia 24.8               Anion Gap     12.0           Ao peak mitch         1.5       Ao VTI         25.4       PTT     27.5  Comment: For Minimal Heparin Infusion, the goal aPTT 64-85 seconds corresponds to an anti-Xa of 0.3-0.5.    For Low Intensity and High Intensity Heparin, the goal aPTT  seconds corresponds to an anti-Xa of 0.3-0.7           AST     39           AV mean gradient         4       AV index (prosthetic)         0.75       AV peak gradient         9       AV Velocity Ratio         0.80       Baso #   0.01             Basophil %   0.2             BILIRUBIN TOTAL      0.5           BSA         1.57       BUN     6.8           BUN/CREAT RATIO     9           Calcium     8.4           Chloride     100           Cholesterol Total       182         CO2     21           CPK       232         Creatinine     0.75           Left Ventricle Relative Wall Thickness         0.82       E/A ratio         0.60       E/E' ratio         11       eGFR     >60  Comment: Estimated GFR calculated using the CKD-EPI creatinine (2021) equation.           Eos #   0.00             Eos %   0.0             Estimated Avg Glucose     102.5           FS         41.2       Globulin, Total     3.0           Glucose     107           HDL       96         Hematocrit   36.8             Hemoglobin   12.5             Hemoglobin A1C External     5.2           Immature Grans (Abs)   0.01             Immature Granulocytes   0.2             INR     0.9           IVSd         1.0       Lactic Acid Level       1.9         LA size         2.7       LDL Cholesterol       73.00  Comment: LDL calculated using the Friedewald equation.         LV LATERAL E/E' RATIO         8.9       LV SEPTAL E/E' RATIO         14.2       LV EDV BP         49       LV Diastolic Volume Index         30.63       Left Ventricular End Diastolic Volume by Teichholz Method         48.80       LV EDV A2C         [id number]       LV EDV A4C         66.30       Left Ventricular End Systolic Volume by Teichholz Method         13.20       LVIDd         3.4       LVIDs         2.0       LV mass         130.2       LV Mass Index         81.4       Left Ventricular Outflow Tract Mean Gradient         3.00       Left Ventricular Outflow Tract Mean Velocity         0.79       LVOT peak mitch         1.2       LVOT peak VTI         19.0       LV ESV BP         13       LV Systolic Volume Index         8.1       Lymph #   1.17             LYMPH %   25.8             Magnesium      2.10           MCH   31.4             MCHC   34.0             MCV   92.5              Mean e'         0.07       Mono #   0.65             Mono %   14.3             MPV   13.6             MV Peak A Trevor         1.18       MV Peak E Trevor         0.71       Neut #   2.69             Neut %   59.5             nRBC   0.0             Holley's Biplane MOD Ejection Fraction         58       Phosphorus Level     2.8           Platelet Count   116             Potassium     3.5           PROTEIN TOTAL     6.6           PT     11.6           PV peak gradient         13       PV PEAK VELOCITY         1.80       PW         1.4       RBC   3.98             RDW   14.4             Sodium     133           TAPSE         2.46       TDI SEPTAL         0.05       TDI LATERAL         0.08       Total Cholesterol/HDL Ratio       2         Triglycerides       65         TR Max Trevor         2.9       Troponin I     0.028           TSH       3.600         Very Low Density Lipoprotein       13         WBC   4.53             ZLVIDD         -2.92       ZLVIDS         -2.73                              Significant Imaging:   CT head w/o:     Impression:     1. Mild hyperdensity along the falx.  Differential considerations include trace subdural hemorrhage or calcifications.  In the absence of outside prior for comparison, suggest short interval follow-up head CT to reassess.  2. Chronic cerebral atrophy and small-vessel ischemic change  3. Left occipital scalp hematoma.    CTA head/neck:  Impression:        No evidence of large vessel occlusion seen     Some areas of atherosclerotic plaque as outlined above but no high-grade stenosis seen     Soft tissue cephalohematoma in the scalp in the left posterior parietal region.    MRI brain w/o:  Impression:     No acute intracranial pathology.     Moderate chronic small-vessel ischemic changes of the supratentorial white matter.     4.6 cm x 2.4 cm hematoma within the left parietooccipital scalp with surrounding soft tissue swelling.    I have reviewed all pertinent imaging  results/findings within the past 24 hours.  Assessment and Plan:     Repeated falls  With last two falls involving +LOC  CT head w/o: possible trace SDH per radiology report  Await EEG results  Consideration for placement in facility following discharge  Fall precautions          VTE Risk Mitigation (From admission, onward)           Ordered     IP VTE HIGH RISK PATIENT  Once         03/04/25 1328     Place sequential compression device  Until discontinued         03/04/25 1328     Place sequential compression device  Until discontinued         03/04/25 1323                    Thank you for your consult.  Further recommendations to follow per MD Dona Ludwig, DEE-BC  Neurology  [facility] - Neurology

## 2025-03-05 NOTE — PT/OT/SLP EVAL
[facility]  Speech Language Pathology Department  Clinical Swallow Evaluation    Patient Name:  Ana Maria Callejas   MRN:  [id number]    Recommendations     General recommendations:  Speech/Language and Cognitive Evaluation  Solid texture recommendation:  Regular Diet - IDDSI Level 7  Liquid consistency recommendation: Thin liquids - IDDSI Level 0   Medications: per patient preference  Swallow strategies/precautions: small bites/sips and slow rate    History     Ana Maria Callejas is a/n 74 y.o. female admitted for AMS. CTA brain showed no occulusion. MRI of the brain was negative for any acute findings.     Home diet texture/consistency: Regular and thin liquids  Current method of nutrition: NPO      Imaging   Results for orders placed during the hospital encounter of 03/04/25    X-Ray Chest 1 View    Narrative  EXAMINATION:  XR CHEST 1 VIEW    CLINICAL HISTORY:  r/o bleeding or hemorrhage;    TECHNIQUE:  Single frontal view of the chest was performed.    COMPARISON:  None available    FINDINGS:  There are chronic appearing lung changes seen bilaterally. No evidence of acute infiltrate is seen. No mass is seen. No lesion is seen. No pleural effusion is seen. The heart appears normal. The aorta is ectatic.  The bones and joints show no acute abnormality.    Impression  No acute process    Electronically signed by: Sae Pina  Date:    03/04/2025  Time:    11:58      Subjective     Patient awake and alert.    Spiritual/Cultural/Church Beliefs/Practices that affect care:  no    Pain/Comfort:  0/10    Restraints/positioning devices: none    Objective     ORAL MUSCULATURE  Dentition: own teeth  Secretion Management: adequate  Mucosal Quality: good  Facial Movement: WFL  Buccal Strength & Mobility: WFL  Mandibular Strength & Mobility: WFL  Volitional Cough: Able to clear secretions  PO TRIALS  Consistency Fed By Oral Symptoms Pharyngeal Symptoms   Thin liquid by straw SLP None None   Puree SLP None  None   Chewable solid SLP None None     Assessment     No signs/sx of aspiration observed. REC: SLP to follow up regarding diet tolerance.     Outcome Measures     Functional Oral Intake Scale: 7 - Total oral diet with no restrictions    Education     Patient provided with verbal education regarding POC.  Understanding was verbalized.    Plan     SLP Follow-Up:    3/6/25  Plan of Care reviewed with:  patient     Time Tracking     SLP Treatment Date:   03/05/25  Speech Start Time:  0945  Speech Stop Time:  1000     Speech Total Time (min):  15 min    Billable minutes:  Swallow and Oral Function Evaluation, 15 minutes     03/05/2025

## 2025-03-05 NOTE — PROGRESS NOTES
[facility]  Hospital Medicine Progress Note        Chief Complaint: Inpatient Follow-up     HPI:    73 y.o. adult who  has no past medical history on file.. The patient presented to [facility] on 3/4/2025 with a primary complaint of being in altered mental status. She is now confused, able to say yes or no. Occasionally following verbal commands. Most of the history is from the medical records.   She has been afebrile. Now in restrains. CT brain showed   Impression:  1. Mild hyperdensity along the falx.  Differential considerations include trace subdural hemorrhage or calcifications.  In the absence of outside prior for comparison, suggest short interval follow-up head CT to reassess.  2. Chronic cerebral atrophy and small-vessel ischemic change  3. Left occipital scalp hematoma.        CTA brain showed no occulusion. Labs and vitals stable.   She will be admitted for further evaluation. Seen by trauma team and cleared.      MRI of the brain was negative for any acute findings.    Interval Hx:  One-to-one sitter is at the bedside.  States that she had an okay night though she was little agitated.  She did require some Haldol around 8:00 p.m..  Still confused.  Unclear baseline.  No family available    Objective/physical exam:  General appearance: Patient frail, awake, able to say few words, confused and not following verbal commands. HENT: Atraumatic head. Moist mucous membranes of oral cavity.  Eyes: Normal extraocular movements.   Neck: Supple.   Lungs: Clear to auscultation bilaterally. No wheezing present.   Heart: Regular rate and rhythm. S1 and S2 present with no murmurs/gallop/rub. No pedal edema. No JVD present.   Abdomen: Soft, non-distended, non-tender. No rebound tenderness/guarding. Bowel sounds are normal.   Extremities: No cyanosis, clubbing, or edema.  Skin: No Rash.   Neuro: Moving extremities   Psych/mental status: Confused      VITAL SIGNS: 24 HRS MIN & MAX LAST   Temp   Min: 97.3 °F (36.3 °C)  Max: 100.6 °F (38.1 °C) 97.5 °F (36.4 °C)   BP  Min: 99/68  Max: 157/94 99/68   Pulse  Min: 91  Max: 114  91   Resp  Min: 15  Max: 26 16   SpO2  Min: 92 %  Max: 99 % 98 %       Recent Labs   Lab 03/04/25  1127   WBC 6.02   RBC 4.21*   HGB 13.3*   HCT 39.7*   MCV 94.3*   MCH 31.6*   MCHC 33.5   RDW 14.9   *   MPV 13.0*       Recent Labs   Lab 03/04/25  1127 03/05/25  0138   * 133*   K 4.4 3.5   CL 96* 100   CO2 24 21*   BUN 12.0 6.8*   CREATININE 0.85 0.75   CALCIUM 9.2 8.4   MG  --  2.10   ALBUMIN 3.8 3.6   ALKPHOS 58 52   ALT 17 21   AST 33 39*   BILITOT 0.3 0.5          Microbiology Results (last 7 days)       Procedure Component Value Units Date/Time    Blood Culture [[id number]] Collected: 03/04/25 1604    Order Status: Resulted Specimen: Blood Updated: 03/04/25 1730    Blood Culture [[id number]] Collected: 03/04/25 1604    Order Status: Resulted Specimen: Blood Updated: 03/04/25 1730             Radiology:  MRI Brain Limited Without Contrast  START OF REPORT:  Technique: Multiplanar, multisequence magnetic resonance imaging of the brain was performed without intravenous contrast.    Comparison: Correlation is with CT study dated 2025-03-04 11:34:34.    Clinical history: Stroke follow up.    Findings:  Hemorrhage: No acute intracranial hemorrhage is identified.  Stroke: No abnormal signal is identified on the diffusion images to suggest acute infarct.  Extra axial spaces: The ventricles and sulci and basal cisterns all appear moderately consistent with global cerebral atrophy.  Subarachnoid space: Age appropriate.  Basilar cisterns: Age appropriate.  Ventricles: Age appropriate ventricular size.  Cerebellopontine angles: Normal.  Cerebral, cerebellar, and brainstem parenchyma: Moderate periventricular and subcortical white matter signal abnormalities are seen. The main consideration is small vessel ischemic changes in a patient of this age. Moderate cerebral volume loss is  noted.  Cranial nerves: Normal.  Herniation: None.  Calvarium: Within normal limits.  Vascular system: Normal flow voids.  Visualized paranasal sinuses: Normal.  Visualized orbits: The visualized orbits appear unremarkable.  Sella and skull base: Normal.  Temporal bones and mastoids: Within normal limits.    Miscellaneous: There appears to be no significant change in the subgaleal hematoma in the left temporo-occipital scalp with associated soft tissue swelling.    Impression:  1. No acute intracranial process is identified. Details and other findings as discussed above.        Medications:  Scheduled Meds:    Continuous Infusions:   0.9% NaCl   Intravenous Continuous 125 mL/hr at 03/04/25 1530 New Bag at 03/04/25 1530     PRN Meds:.  Current Facility-Administered Medications:     acetaminophen, 650 mg, Rectal, Q6H PRN    bisacodyL, 10 mg, Rectal, Daily PRN    haloperidol lactate, 5 mg, Intravenous, Q6H PRN    labetalol, 10 mg, Intravenous, Q6H PRN    ondansetron, 4 mg, Intravenous, Q8H PRN    Nutrition:  Nutrition consulted. Most recent weight and BMI monitored-     Measurements:  Wt Readings from Last 1 Encounters:   03/04/25 52.2 kg (115 lb)   Body mass index is 18.01 kg/m².    Patient has been screened and assessed by RD.    Malnutrition Type:  Context:    Level:      Malnutrition Characteristic Summary:       Interventions/Recommendations (treatment strategy):           Assessment/Plan:   Acute metabolic encephalopathy   Scalp hematoma   ? Subdural hemorrhage   Mild hyponatremia     Encephalopathy continues.  CT head a mild subdural hematoma.  Unclear if her confusion is from this or if the confusion pre dates.    MRI of the brain was unimpressive.    PT OT and speech therapy evaluations today.  Will also get Neurology to weigh in.  May need an EEG.  Continue with p.r.n. agitation meds as needed.  If cleared for oral intake can DC IV fluids.  Does not appear to be overtly dehydrated but hyponatremia  continues      Venkat Kaplan MD   03/05/2025     All diagnosis and differential diagnosis have been reviewed; assessment and plan has been documented; I have personally reviewed the labs and test results that are presently available; I have reviewed the patients medication list; I have reviewed the consulting providers response and recommendations. I have reviewed or attempted to review medical records based upon their availability    All of the patient's questions have been  addressed and answered. Patient's is agreeable to the above stated plan. I will continue to monitor closely and make adjustments to medical management as needed.  _____________________________________________________________________

## 2025-03-05 NOTE — AI DETERIORATION ALERT
Artificial Intelligence Notification  [facility]  [address]  [place] [address]  Phone: [phone]    This documentation was triggered by an Artificial Intelligence Notification:    Admit Date: 3/4/2025   LOS: 0  Code Status: Full Code  : 1950  Age: 74 y.o.  Weight:   Wt Readings from Last 1 Encounters:   25 52.2 kg (115 lb)        Sex: female  Bed: 2/[facility]  MRN: [id number]  Attending Physician: Francis Valentin MD     Date of Alert: 2025  Time AI Alert Received:             Vitals:    25   BP: 124/66   Pulse:    Resp:    Temp:      SpO2: 98 %      Artificial Intelligence alert discussed with Provider:     Name: no indication to contact provider   Date/Time of Provider Notification: N/A      Patient Condition: VS stable

## 2025-03-05 NOTE — SUBJECTIVE & OBJECTIVE
No past medical history on file.    No past surgical history on file.    Review of patient's allergies indicates:  Not on File    Current Neurological Medications:     No current facility-administered medications on file prior to encounter.     Current Outpatient Medications on File Prior to Encounter   Medication Sig    amLODIPine (NORVASC) 10 MG tablet Take 10 mg by mouth once daily.    estrogens, conjugated, (PREMARIN) 0.3 MG tablet Take 0.3 mg by mouth once daily.    levothyroxine (SYNTHROID) 50 MCG tablet Take 50 mcg by mouth before breakfast. Take 1 tablet every other day alternate with 75mcg dose    levothyroxine (SYNTHROID) 75 MCG tablet Take 75 mcg by mouth before breakfast.    mycophenolate (CELLCEPT) 500 mg Tab Take 500 mg by mouth 2 (two) times daily.    predniSONE (DELTASONE) 5 MG tablet Take 5 mg by mouth once daily.    trospium (SANCTURA) 20 mg Tab tablet Take 20 mg by mouth 2 (two) times daily.     Family History    None       Tobacco Use    Smoking status: Not on file    Smokeless tobacco: Not on file   Substance and Sexual Activity    Alcohol use: Not on file    Drug use: Not on file    Sexual activity: Not on file     Review of Systems  A 14pt ros was reviewed & is negative unless o/w documented in the hpi    Objective:     Vital Signs (Most Recent):  Temp: 98.5 °F (36.9 °C) (03/05/25 1109)  Pulse: 91 (03/05/25 1109)  Resp: 16 (03/05/25 0341)  BP: 112/68 (03/05/25 1109)  SpO2: 95 % (03/05/25 1109) Vital Signs (24h Range):  Temp:  [97.5 °F (36.4 °C)-100.6 °F (38.1 °C)] 98.5 °F (36.9 °C)  Pulse:  [] 91  Resp:  [16-26] 16  SpO2:  [95 %-98 %] 95 %  BP: ()/(62-71) 112/68     Weight: 52.2 kg (115 lb)  Body mass index is 18.01 kg/m².     Physical Exam  Vitals reviewed.   Constitutional:       General: She is awake.      Appearance: Normal appearance.   HENT:      Head: Normocephalic and atraumatic.      Nose: Nose normal.      Mouth/Throat:      Mouth: Mucous membranes are moist.       Pharynx: Oropharynx is clear.   Eyes:      Extraocular Movements: Extraocular movements intact and EOM normal.      Pupils: Pupils are equal, round, and reactive to light.   Pulmonary:      Effort: Pulmonary effort is normal.   Musculoskeletal:         General: Normal range of motion.      Cervical back: Normal range of motion.   Skin:     General: Skin is warm and dry.   Neurological:      Mental Status: She is alert and oriented to person, place, and time.   Psychiatric:         Mood and Affect: Mood normal.         Speech: Speech normal.         Behavior: Behavior normal. Behavior is cooperative.          NEUROLOGICAL EXAMINATION:     MENTAL STATUS   Oriented to person, place, and time.   Follows 3 step commands.   Attention: normal. Concentration: normal.   Speech: speech is normal   Level of consciousness: alert  Knowledge: good.   Normal comprehension.     CRANIAL NERVES     CN II   Visual fields full to confrontation.     CN III, IV, VI   Pupils are equal, round, and reactive to light.  Extraocular motions are normal.   Nystagmus: none   Conjugate gaze: present    CN V   Facial sensation intact.     CN VII   Facial expression full, symmetric.     MOTOR EXAM        Moves all extremities, no focal weakness noted     REFLEXES     Reflexes   Right plantar: normal  Left plantar: normal  Right ankle clonus: absent  Left ankle clonus: absent    SENSORY EXAM   Light touch normal.       Significant Labs:   Recent Lab Results  (Last 5 results in the past 24 hours)        03/05/25  0924   03/05/25  0636   03/05/25  0138   03/04/25  1503   03/04/25  1448        Immature Platelet Fraction   18.6             Albumin/Globulin Ratio     1.2           A2C EF         57       A4C EF         61       ABO and RH       O POS         Albumin     3.6           ALP     52           ALT     21           Ammonia 24.8               Anion Gap     12.0           Ao peak mitch         1.5       Ao VTI         25.4       PTT      27.5  Comment: For Minimal Heparin Infusion, the goal aPTT 64-85 seconds corresponds to an anti-Xa of 0.3-0.5.    For Low Intensity and High Intensity Heparin, the goal aPTT  seconds corresponds to an anti-Xa of 0.3-0.7           AST     39           AV mean gradient         4       AV index (prosthetic)         0.75       AV peak gradient         9       AV Velocity Ratio         0.80       Baso #   0.01             Basophil %   0.2             BILIRUBIN TOTAL     0.5           BSA         1.57       BUN     6.8           BUN/CREAT RATIO     9           Calcium     8.4           Chloride     100           Cholesterol Total       182         CO2     21           CPK       232         Creatinine     0.75           Left Ventricle Relative Wall Thickness         0.82       E/A ratio         0.60       E/E' ratio         11       eGFR     >60  Comment: Estimated GFR calculated using the CKD-EPI creatinine (2021) equation.           Eos #   0.00             Eos %   0.0             Estimated Avg Glucose     102.5           FS         41.2       Globulin, Total     3.0           Glucose     107           HDL       96         Hematocrit   36.8             Hemoglobin   12.5             Hemoglobin A1C External     5.2           Immature Grans (Abs)   0.01             Immature Granulocytes   0.2             INR     0.9           IVSd         1.0       Lactic Acid Level       1.9         LA size         2.7       LDL Cholesterol       73.00  Comment: LDL calculated using the Friedewald equation.         LV LATERAL E/E' RATIO         8.9       LV SEPTAL E/E' RATIO         14.2       LV EDV BP         49       LV Diastolic Volume Index         30.63       Left Ventricular End Diastolic Volume by Teichholz Method         48.80       LV EDV A2C         [id number]       LV EDV A4C         66.30       Left Ventricular End Systolic Volume by Teichholz Method         13.20       LVIDd         3.4       LVIDs         2.0        LV mass         130.2       LV Mass Index         81.4       Left Ventricular Outflow Tract Mean Gradient         3.00       Left Ventricular Outflow Tract Mean Velocity         0.79       LVOT peak trevor         1.2       LVOT peak VTI         19.0       LV ESV BP         13       LV Systolic Volume Index         8.1       Lymph #   1.17             LYMPH %   25.8             Magnesium      2.10           MCH   31.4             MCHC   34.0             MCV   92.5             Mean e'         0.07       Mono #   0.65             Mono %   14.3             MPV   13.6             MV Peak A Trevor         1.18       MV Peak E Trevor         0.71       Neut #   2.69             Neut %   59.5             nRBC   0.0             Holley's Biplane MOD Ejection Fraction         58       Phosphorus Level     2.8           Platelet Count   116             Potassium     3.5           PROTEIN TOTAL     6.6           PT     11.6           PV peak gradient         13       PV PEAK VELOCITY         1.80       PW         1.4       RBC   3.98             RDW   14.4             Sodium     133           TAPSE         2.46       TDI SEPTAL         0.05       TDI LATERAL         0.08       Total Cholesterol/HDL Ratio       2         Triglycerides       65         TR Max Trevor         2.9       Troponin I     0.028           TSH       3.600         Very Low Density Lipoprotein       13         WBC   4.53             ZLVIDD         -2.92       ZLVIDS         -2.73                              Significant Imaging:   CT head w/o:     Impression:     1. Mild hyperdensity along the falx.  Differential considerations include trace subdural hemorrhage or calcifications.  In the absence of outside prior for comparison, suggest short interval follow-up head CT to reassess.  2. Chronic cerebral atrophy and small-vessel ischemic change  3. Left occipital scalp hematoma.    CTA head/neck:  Impression:        No evidence of large vessel occlusion seen     Some  areas of atherosclerotic plaque as outlined above but no high-grade stenosis seen     Soft tissue cephalohematoma in the scalp in the left posterior parietal region.    MRI brain w/o:  Impression:     No acute intracranial pathology.     Moderate chronic small-vessel ischemic changes of the supratentorial white matter.     4.6 cm x 2.4 cm hematoma within the left parietooccipital scalp with surrounding soft tissue swelling.    I have reviewed all pertinent imaging results/findings within the past 24 hours.

## 2025-03-06 LAB
ANION GAP SERPL CALC-SCNC: 14 MEQ/L
BASOPHILS # BLD AUTO: 0.01 X10(3)/MCL
BASOPHILS NFR BLD AUTO: 0.2 %
BUN SERPL-MCNC: 8.6 MG/DL (ref 9.8–20.1)
CALCIUM SERPL-MCNC: 8.1 MG/DL (ref 8.4–10.2)
CHLORIDE SERPL-SCNC: 98 MMOL/L (ref 98–107)
CO2 SERPL-SCNC: 19 MMOL/L (ref 23–31)
CREAT SERPL-MCNC: 0.65 MG/DL (ref 0.55–1.02)
CREAT/UREA NIT SERPL: 13
EOSINOPHIL # BLD AUTO: 0 X10(3)/MCL (ref 0–0.9)
EOSINOPHIL NFR BLD AUTO: 0 %
ERYTHROCYTE [DISTWIDTH] IN BLOOD BY AUTOMATED COUNT: 14.1 % (ref 11.5–17)
GFR SERPLBLD CREATININE-BSD FMLA CKD-EPI: >60 ML/MIN/1.73/M2
GLUCOSE SERPL-MCNC: 90 MG/DL (ref 82–115)
HCT VFR BLD AUTO: 35.8 % (ref 37–47)
HGB BLD-MCNC: 12.7 G/DL (ref 12–16)
IMM GRANULOCYTES # BLD AUTO: 0.01 X10(3)/MCL (ref 0–0.04)
IMM GRANULOCYTES NFR BLD AUTO: 0.2 %
LYMPHOCYTES # BLD AUTO: 1.48 X10(3)/MCL (ref 0.6–4.6)
LYMPHOCYTES NFR BLD AUTO: 36.5 %
MCH RBC QN AUTO: 31.5 PG (ref 27–31)
MCHC RBC AUTO-ENTMCNC: 35.5 G/DL (ref 33–36)
MCV RBC AUTO: 88.8 FL (ref 80–94)
MONOCYTES # BLD AUTO: 0.53 X10(3)/MCL (ref 0.1–1.3)
MONOCYTES NFR BLD AUTO: 13.1 %
NEUTROPHILS # BLD AUTO: 2.03 X10(3)/MCL (ref 2.1–9.2)
NEUTROPHILS NFR BLD AUTO: 50 %
NRBC BLD AUTO-RTO: 0 %
PLATELET # BLD AUTO: 109 X10(3)/MCL (ref 130–400)
PMV BLD AUTO: 12.6 FL (ref 7.4–10.4)
POTASSIUM SERPL-SCNC: 3.3 MMOL/L (ref 3.5–5.1)
RBC # BLD AUTO: 4.03 X10(6)/MCL (ref 4.2–5.4)
SODIUM SERPL-SCNC: 131 MMOL/L (ref 136–145)
WBC # BLD AUTO: 4.06 X10(3)/MCL (ref 4.5–11.5)

## 2025-03-06 PROCEDURE — 25000003 PHARM REV CODE 250: Performed by: HOSPITALIST

## 2025-03-06 PROCEDURE — 25000003 PHARM REV CODE 250: Performed by: INTERNAL MEDICINE

## 2025-03-06 PROCEDURE — 97530 THERAPEUTIC ACTIVITIES: CPT

## 2025-03-06 PROCEDURE — 36415 COLL VENOUS BLD VENIPUNCTURE: CPT | Performed by: HOSPITALIST

## 2025-03-06 PROCEDURE — 85025 COMPLETE CBC W/AUTO DIFF WBC: CPT | Performed by: HOSPITALIST

## 2025-03-06 PROCEDURE — 97535 SELF CARE MNGMENT TRAINING: CPT

## 2025-03-06 PROCEDURE — 97116 GAIT TRAINING THERAPY: CPT

## 2025-03-06 PROCEDURE — 80048 BASIC METABOLIC PNL TOTAL CA: CPT | Performed by: HOSPITALIST

## 2025-03-06 PROCEDURE — 63600175 PHARM REV CODE 636 W HCPCS: Performed by: HOSPITALIST

## 2025-03-06 PROCEDURE — 21400001 HC TELEMETRY ROOM

## 2025-03-06 PROCEDURE — 25000003 PHARM REV CODE 250: Performed by: NURSE PRACTITIONER

## 2025-03-06 RX ORDER — POTASSIUM CHLORIDE 20 MEQ/1
40 TABLET, EXTENDED RELEASE ORAL ONCE
Status: COMPLETED | OUTPATIENT
Start: 2025-03-06 | End: 2025-03-06

## 2025-03-06 RX ADMIN — PREDNISONE 5 MG: 5 TABLET ORAL at 08:03

## 2025-03-06 RX ADMIN — LEVOTHYROXINE SODIUM 75 MCG: 0.05 TABLET ORAL at 05:03

## 2025-03-06 RX ADMIN — Medication 9 MG: at 08:03

## 2025-03-06 RX ADMIN — POTASSIUM CHLORIDE 40 MEQ: 1500 TABLET, EXTENDED RELEASE ORAL at 12:03

## 2025-03-06 RX ADMIN — MYCOPHENOLATE MOFETIL 500 MG: 250 CAPSULE ORAL at 08:03

## 2025-03-06 RX ADMIN — AMLODIPINE BESYLATE 10 MG: 5 TABLET ORAL at 08:03

## 2025-03-06 NOTE — PT/OT/SLP PROGRESS
Occupational Therapy   Treatment    Name: Ana Maria Callejas  MRN: [id number]    Recommendations:     Recommended therapy intensity at discharge: Moderate Intensity Therapy   Discharge Equipment Recommendations:  to be determined by next level of care  Barriers to discharge:  Decreased caregiver support, Other (Comment) (ongoing medical needs; impaired ability to complete ADLs compared to baseline abilities)    Assessment:     Ana Maria Callejas is a 74 y.o. female with a medical diagnosis of acute metabolic encephalopathy, scalp hematoma, mild SDH, milk hyponatremia. Performance deficits affecting function are weakness, impaired endurance, decreased upper extremity function, impaired self care skills, impaired functional mobility, gait instability, impaired cardiopulmonary response to activity.     Pt asymptomatic for OH during mobility. Able to perform oral care while standing at sink with CGA overall.     Rehab Prognosis:  Good; patient would benefit from acute skilled OT services to address these deficits and reach maximum level of function.       Plan:     Patient to be seen 5 x/week to address the above listed problems via self-care/home management, therapeutic activities, therapeutic exercises, neuromuscular re-education  Plan of Care Expires: 04/09/25  Plan of Care Reviewed with: patient    Subjective     Pain/Comfort:  Pain Rating 1: 0/10    Objective:     Communicated with: nursing prior to session.  Patient found HOB elevated with peripheral IV, telemetry, pulse ox (continuous), Other (comments) (1:1 sitter) upon OT entry to room.    General Precautions: Standard, fall    Orthopedic Precautions:N/A  Braces: N/A  Respiratory Status: Room air  Vital Signs: Blood Pressure:    Supine: 109-65  Seated EOB: 130/74     Occupational Performance:     Bed Mobility:    Patient completed Supine to Sit with contact guard assistance     Functional Mobility/Transfers:  Patient completed Sit <> Stand Transfer with contact guard  assistance  with  rolling walker   Functional Mobility: Ambulated with RW and CGA overall. No dizziness reported. 1 seated rest break.     Activities of Daily Living:  Grooming: contact guard assistance to perform oral care while standing at sink    Therapeutic Positioning    OT interventions performed during the course of today's session in an effort to prevent and/or reduce acquired pressure injuries:   Education was provided on benefits of and recommendations for therapeutic positioning    [facility] 6 Click ADL: 22    Patient Education:  Patient provided with verbal education education regarding fall prevention and safety awareness.  Understanding was verbalized.      Patient left up in chair with call button in reach and sitter present.    GOALS:   Multidisciplinary Problems       Occupational Therapy Goals          Problem: Occupational Therapy    Goal Priority Disciplines Outcome Interventions   Occupational Therapy Goal     OT, PT/OT Progressing    Description: Goals to be met by 3/5/2025:    Pt will complete grooming standing at sink with LRAD with SBA.  Pt will complete UB dressing with SBA.  Pt will complete LB dressing with SBA using LRAD.  Pt will complete toileting with SBA using LRAD.  Pt will complete functional mobility to/from toilet and toilet transfer with SBA using LRAD.                         Time Tracking:     OT Date of Treatment: 03/06/25  OT Start Time: 1344  OT Stop Time: 1408  OT Total Time (min): 24 min    Billable Minutes:Self Care/Home Management 2 units    OT/GARDENIA: OT     Number of GARDENIA visits since last OT visit: 1    3/6/2025

## 2025-03-06 NOTE — PT/OT/SLP PROGRESS
Physical Therapy Treatment    Patient Name:  Ana Maria Callejas   MRN:  [id number]    Recommendations:     Discharge therapy intensity: Moderate Intensity Therapy   Discharge Equipment Recommendations: walker, rolling  Barriers to discharge: Impaired mobility and Ongoing medical needs    Assessment:     Ana Maria Callejas is a 74 y.o. female admitted with a medical diagnosis of acute metabolic encephalopathy, scalp hematoma, mild SDH, milk hyponatremia.  She presents with the following impairments/functional limitations: weakness, impaired endurance, impaired self care skills, impaired functional mobility, gait instability, impaired balance, impaired cognition.    Pt asymptomatic for OH and progressing to ambulation x 54 feet with RW and CGA. Pt remains high fall risk and would benefit from placement beyond hospital stay.     Rehab Prognosis: Good; patient would benefit from acute skilled PT services to address these deficits and reach maximum level of function.    Recent Surgery: * No surgery found *      Plan:     During this hospitalization, patient would benefit from acute PT services 5 x/week to address the identified rehab impairments via gait training, therapeutic activities, therapeutic exercises and progress toward the following goals:    Plan of Care Expires:  04/05/25    Subjective     Chief Complaint: none stated  Patient/Family Comments/goals: none stated   Pain/Comfort:  Pain Rating 1: 0/10      Objective:     Communicated with NSG prior to session.  Patient found HOB elevated with telemetry, pulse ox (continuous), peripheral IV (1:1) upon PT entry to room.     General Precautions: Standard, fall  Orthopedic Precautions: N/A  Braces: N/A  Respiratory Status: Room air  Blood Pressure: 109/65 HOB elevated, 130/74 seated EOB  Skin Integrity: Visible skin intact      Functional Mobility:  Bed Mobility:     Supine to Sit: contact guard assistance  Transfers:     Sit to Stand:  contact guard assistance with rolling  walker  Gait: 54 feet x 2 with RW and CGA; VC for close proximity to RW with seated rest required in between trials.   Balance: SBA for static sitting balance; CGA for static standing balance at sink while performing oral care       Education:  Patient provided with verbal education education regarding PT role/goals/POC, fall prevention, safety awareness, and discharge/DME recommendations.  Understanding was verbalized.     Patient left up in chair with all lines intact, call button in reach, nsg notified, and 1:1 present    GOALS:   Multidisciplinary Problems       Physical Therapy Goals          Problem: Physical Therapy    Goal Priority Disciplines Outcome Interventions   Physical Therapy Goal     PT, PT/OT Progressing    Description: Goals to be met by: 2025     Patient will increase functional independence with mobility by performin. Supine to sit with Stand-by Assistance  2. Sit to stand transfer with Stand-by Assistance  3. Gait  x 150 feet with Stand-by Assistance using Rolling Walker or LRAD.                          Time Tracking:     PT Received On: 25  PT Start Time: 1344     PT Stop Time: 1410  PT Total Time (min): 26 min     Billable Minutes: Gait Training 14 and Therapeutic Activity 14    Treatment Type: Treatment  PT/PTA: PT     Number of PTA visits since last PT visit: 2025

## 2025-03-06 NOTE — PLAN OF CARE
#      spoke with son Saumya. Reviewed plan of care for patient . Son is coming to the hospital tomorrow and will get list for SNF facilities. And review to give choices for patient.    03/06/25 1717   Discharge Assessment   Assessment Type Discharge Planning Assessment   Confirmed/corrected address, phone number and insurance Yes   Confirmed Demographics Correct on Facesheet   Source of Information patient   When was your last doctors appointment?   (Dr Jory Castellanos)   Communicated GUMARO with patient/caregiver Date not available/Unable to determine   Reason For Admission AMS 9altered mental status/ Acuute CVA (cerebral vascular Accident   People in Home alone   Do you expect to return to your current living situation? Yes   Do you have help at home or someone to help you manage your care at home? No   Prior to hospitilization cognitive status: Unable to Assess   Current cognitive status: Alert/Oriented   Walking or Climbing Stairs Difficulty no   Dressing/Bathing Difficulty no   Home Layout Able to live on 1st floor   Equipment Currently Used at Home shower chair   Readmission within 30 days? No   Patient currently being followed by outpatient case management? No   Do you currently have service(s) that help you manage your care at home? No   Do you take prescription medications? Yes   Do you have prescription coverage? Yes   Coverage Medicare   Do you have any problems affording any of your prescribed medications? No   Is the patient taking medications as prescribed? yes   Who is going to help you get home at discharge? family   How do you get to doctors appointments? car, drives self;family or friend will provide   Are you on dialysis? No   Do you take coumadin? No   Discharge Plan A Skilled Nursing Facility   Discharge Plan B Home;Home with family;Home Health   DME Needed Upon Discharge  none   Discharge Plan discussed with: Patient   Transition of Care Barriers None   Physical Activity   On average,  how many days per week do you engage in moderate to strenuous exercise (like a brisk walk)? 0 days   On average, how many minutes do you engage in exercise at this level? 0 min   Financial Resource Strain   How hard is it for you to pay for the very basics like food, housing, medical care, and heating? Not hard   Housing Stability   In the last 12 months, was there a time when you were not able to pay the mortgage or rent on time? N   At any time in the past 12 months, were you homeless or living in a shelter (including now)? N   Transportation Needs   Has the lack of transportation kept you from medical appointments, meetings, work or from getting things needed for daily living? No   Food Insecurity   Within the past 12 months, you worried that your food would run out before you got the money to buy more. Never true   Within the past 12 months, the food you bought just didn't last and you didn't have money to get more. Never true   Social Isolation   How often do you feel lonely or isolated from those around you?  Never   Alcohol Use   Q1: How often do you have a drink containing alcohol? 2-4 pr month  (drinks a couple of glasses of wine 3 days a week)   Q2: How many drinks containing alcohol do you have on a typical day when you are drinking? 1 or 2   Q3: How often do you have six or more drinks on one occasion? Never   [employer]   In the past 12 months has the electric, gas, oil, or water company threatened to shut off services in your home? No   Health Literacy   How often do you need to have someone help you when you read instructions, pamphlets, or other written material from your doctor or pharmacy? Never

## 2025-03-06 NOTE — PROGRESS NOTES
[facility]  Hospital Medicine Progress Note        Chief Complaint: Inpatient Follow-up     HPI:   73 y.o. adult who  has Lupus presented to [facility] on 3/4/2025 with a primary complaint of being in altered mental status. Per charts patient's family reports recurrent falls over the last several months with the worst falls occurring last November and on this admission.  Family reports intracranial bleed was as a result the fall last November and she lost consciousness during that episode as well.  Patient's daughter-in-law at bedside reports falls prior to November for likely due to orthostasis. Patient's family reports lives alone, ambulates without assistance, and performs ADLs independently at baseline. CT brain showed 1. Mild hyperdensity along the falx.  Differential considerations include trace subdural hemorrhage or calcifications.  In the absence of outside prior for comparison, suggest short interval follow-up head CT to reassess. 2. Chronic cerebral atrophy and small-vessel ischemic change. 3. Left occipital scalp hematoma. CTA brain showed no occulusion. Labs and vitals stable. She will be admitted for further evaluation. Seen by trauma team and cleared. MRI of the brain was negative for any acute findings.     3/5 - One-to-one sitter is at the bedside.  States that she had an okay night though she was little agitated.  She did require some Haldol around 8:00 p.m..  Still confused.  Unclear baseline.      CT head without revealed possible trace SDH.  CTA head/neck unrevealing for flow-limiting stenosis, LVO, or aneurysms.    MRI brain w/o unrevealing for acute intracranial abnormalities.  Labs revealed mild hyponatremia, otherwise unremarkable.  TTE revealed LVEF 60-65% and otherwise unrevealing.    Interval Hx:     AF. NAEON. Sitter in room. Answering questions appropriately  Discussed with son outside patients room    Case was discussed with patient's nurse and  on  the floor.    Objective/physical exam:  General: In no acute distress, afebrile  Chest: Clear to auscultation bilaterally  Heart: RRR, +S1, S2, no appreciable murmur  Abdomen: Soft, nontender, BS +  MSK: Warm, no lower extremity edema, no clubbing or cyanosis  Neuro: alert and orient. No focal deficit.    VITAL SIGNS: 24 HRS MIN & MAX LAST   Temp  Min: 97.9 °F (36.6 °C)  Max: 98.6 °F (37 °C) 97.9 °F (36.6 °C)   BP  Min: 105/66  Max: 127/70 112/68   Pulse  Min: 79  Max: 97  79   Resp  Min: 16  Max: 18 18   SpO2  Min: 92 %  Max: 99 % 97 %     I have reviewed the following labs:  Recent Labs   Lab 03/04/25 1127 03/05/25  0636 03/06/25  0402   WBC 6.02 4.53 4.06*   RBC 4.21* 3.98* 4.03*   HGB 13.3* 12.5 12.7   HCT 39.7* 36.8* 35.8*   MCV 94.3* 92.5 88.8   MCH 31.6* 31.4* 31.5*   MCHC 33.5 34.0 35.5   RDW 14.9 14.4 14.1   * 116* 109*   MPV 13.0* 13.6* 12.6*     Recent Labs   Lab 03/04/25  1127 03/05/25  0138 03/06/25  0402   * 133* 131*   K 4.4 3.5 3.3*   CL 96* 100 98   CO2 24 21* 19*   BUN 12.0 6.8* 8.6*   CREATININE 0.85 0.75 0.65   CALCIUM 9.2 8.4 8.1*   MG  --  2.10  --    ALBUMIN 3.8 3.6  --    ALKPHOS 58 52  --    ALT 17 21  --    AST 33 39*  --    BILITOT 0.3 0.5  --      Microbiology Results (last 7 days)       Procedure Component Value Units Date/Time    Blood Culture [[id number]]  (Normal) Collected: 03/04/25 1604    Order Status: Completed Specimen: Blood Updated: 03/05/25 1801     Blood Culture No Growth At 24 Hours    Blood Culture [[id number]]  (Normal) Collected: 03/04/25 1604    Order Status: Completed Specimen: Blood Updated: 03/05/25 1801     Blood Culture No Growth At 24 Hours             See below for Radiology    Assessment/Plan:    Recurrent falls  - With last two falls involving +LOC   Acute metabolic encephalopathy - improving  Scalp hematoma 2/2 fall  ? Subdural hemorrhage   Mild hyponatremia   Lupus on mycophenolate, prednisone  Anxiety    Plan  Encephalopathy improving.   Was  taking xanax BID at home, unclear dose, advised to hold it and discuss alternative medication for anxiety  CT head 3/4 - mild subdural hematoma  MRI of the brain 3/5 was unimpressive.  No evidence of intracranial hemorrhage.     Neurology  Ct has a trace of subdural but mri brain negative.   mutiple falls and syncope mostly secondary to orhtostatic bp, sz is possibility but felt less likely   F/u eeg if negative then no need to start AED.     S/p IVF. Now on a diet, poor appetite  Continue with p.r.n. agitation meds as needed  Fall precautions  PT/OT  CM for placement    VTE prophylaxis: SCDs    Patient condition: Fair    Anticipated discharge and Disposition:  [facility]       All diagnosis and differential diagnosis have been reviewed; assessment and plan has been documented; I have personally reviewed the labs and test results that are presently available; I have reviewed the patients medication list; I have reviewed the consulting providers response and recommendations. I have reviewed or attempted to review medical records based upon their availability    All of the patient's questions have been  addressed and answered. Patient's is agreeable to the above stated plan. I will continue to monitor closely and make adjustments to medical management as needed.    Portions of this note dictated using EMR integrated voice recognition software, and may be subject to voice recognition errors not corrected at proofreading. Please contact writer for clarification if needed.   _____________________________________________________________________    Malnutrition Status:  Nutrition consulted. Most recent weight and BMI monitored-     Measurements:  Wt Readings from Last 1 Encounters:   03/05/25 52.6 kg (116 lb)   Body mass index is 18.17 kg/m².    Patient has been screened and assessed by RD.    Malnutrition Type:  Context:    Level:      Malnutrition Characteristic Summary:       Interventions/Recommendations (treatment  strategy):        Scheduled Med:   amLODIPine  10 mg Oral Daily    levothyroxine  75 mcg Oral Before breakfast    mycophenolate  500 mg Oral BID    predniSONE  5 mg Oral Daily      Continuous Infusions:     PRN Meds:    Current Facility-Administered Medications:     acetaminophen, 1,000 mg, Oral, Q6H PRN    bisacodyL, 10 mg, Rectal, Daily PRN    haloperidol lactate, 5 mg, Intravenous, Q6H PRN    labetalol, 10 mg, Intravenous, Q6H PRN    melatonin, 9 mg, Oral, Nightly PRN    ondansetron, 4 mg, Intravenous, Q8H PRN     Radiology:  I have personally reviewed the following imaging and agree with the radiologist.     MRI Brain Limited Without Contrast  Narrative: EXAMINATION:  MRI BRAIN LIMITED WITHOUT CONTRAST    CLINICAL HISTORY:  Stroke, follow up;stroke follow up;    TECHNIQUE:  Multiplanar multisequence MR imaging of the brain was performed without contrast.    COMPARISON:  CT head without contrast and CT angiogram head and neck 03/04/2025.    FINDINGS:  INTRACRANIAL: Moderate chronic small-vessel ischemic changes of the supratentorial periventricular and subcortical white matter.  No parenchymal restricted diffusion.  No evidence of intracranial hemorrhage.  No extra-axial fluid collection or mass.  No intracranial mass effect.  No hydrocephalus.  Midline structures have a normal configuration.    SINUSES: Mild mucosal thickening of the anterior ethmoid sinuses.  Mastoid air cells are clear.    ORBITS: Visualized orbits are normal.    Other: Redemonstration of a 4.6 cm x 2.4 cm hematoma within the left parietooccipital scalp with surrounding soft tissue swelling.  Impression: No acute intracranial pathology.    Moderate chronic small-vessel ischemic changes of the supratentorial white matter.    4.6 cm x 2.4 cm hematoma within the left parietooccipital scalp with surrounding soft tissue swelling.    I concur with the preliminary report    Electronically signed by: Diogo  Clive  Date:    03/05/2025  Time:    07:46      Roderick Iglesias MD  Department of Hospital Medicine   [facility]   03/06/2025

## 2025-03-06 NOTE — PT/OT/SLP PROGRESS
[facility]  Speech Language Pathology Department  Diet Tolerance Follow-up    Patient Name:  Ana Maria Callejas   MRN:  [id number]  Admitting Diagnosis:  AMS    Recommendations     General recommendations:  Speech/Language and Cognitive Evaluation  Solid texture recommendation:  Regular Diet - IDDSI Level 7  Liquid consistency recommendation: Thin liquids - IDDSI Level 0   Medications: per patient preference  Swallow strategies/precautions:  small bites/sips and slow rate   Precautions:      Diet Tolerance     Nursing reports no difficulty regarding diet tolerance.    Outcome Measures     Functional Oral Intake Scale: 7 - Total oral diet with no restrictions    Plan     SLP attempting to see pt for speech, language, and cognitive evaluation however patient off floor in EEG.  SLP to reattempt as appropriate and as schedule allows.    03/06/2025

## 2025-03-07 PROBLEM — R41.82 AMS (ALTERED MENTAL STATUS): Status: ACTIVE | Noted: 2025-03-07

## 2025-03-07 LAB
ANION GAP SERPL CALC-SCNC: 9 MEQ/L
BUN SERPL-MCNC: 10 MG/DL (ref 9.8–20.1)
CALCIUM SERPL-MCNC: 7.8 MG/DL (ref 8.4–10.2)
CHLORIDE SERPL-SCNC: 97 MMOL/L (ref 98–107)
CO2 SERPL-SCNC: 20 MMOL/L (ref 23–31)
CREAT SERPL-MCNC: 0.64 MG/DL (ref 0.55–1.02)
CREAT/UREA NIT SERPL: 16
ERYTHROCYTE [DISTWIDTH] IN BLOOD BY AUTOMATED COUNT: 13.8 % (ref 11.5–17)
GFR SERPLBLD CREATININE-BSD FMLA CKD-EPI: >60 ML/MIN/1.73/M2
GLUCOSE SERPL-MCNC: 105 MG/DL (ref 82–115)
HCT VFR BLD AUTO: 34.7 % (ref 37–47)
HGB BLD-MCNC: 12.4 G/DL (ref 12–16)
MCH RBC QN AUTO: 32 PG (ref 27–31)
MCHC RBC AUTO-ENTMCNC: 35.7 G/DL (ref 33–36)
MCV RBC AUTO: 89.7 FL (ref 80–94)
NRBC BLD AUTO-RTO: 0 %
OSMOLALITY SERPL: 261 MOSM/KG (ref 280–300)
OSMOLALITY UR: 619 MOSM/KG (ref 300–1300)
PLATELET # BLD AUTO: 111 X10(3)/MCL (ref 130–400)
PMV BLD AUTO: 12.7 FL (ref 7.4–10.4)
POTASSIUM SERPL-SCNC: 3.8 MMOL/L (ref 3.5–5.1)
RBC # BLD AUTO: 3.87 X10(6)/MCL (ref 4.2–5.4)
SODIUM SERPL-SCNC: 126 MMOL/L (ref 136–145)
SODIUM UR-SCNC: 40 MMOL/L
WBC # BLD AUTO: 4.26 X10(3)/MCL (ref 4.5–11.5)

## 2025-03-07 PROCEDURE — 83935 ASSAY OF URINE OSMOLALITY: CPT | Performed by: INTERNAL MEDICINE

## 2025-03-07 PROCEDURE — 97116 GAIT TRAINING THERAPY: CPT | Mod: CQ

## 2025-03-07 PROCEDURE — 95819 EEG AWAKE AND ASLEEP: CPT

## 2025-03-07 PROCEDURE — 21400001 HC TELEMETRY ROOM

## 2025-03-07 PROCEDURE — 63600175 PHARM REV CODE 636 W HCPCS: Performed by: HOSPITALIST

## 2025-03-07 PROCEDURE — 36415 COLL VENOUS BLD VENIPUNCTURE: CPT | Performed by: INTERNAL MEDICINE

## 2025-03-07 PROCEDURE — 92523 SPEECH SOUND LANG COMPREHEN: CPT

## 2025-03-07 PROCEDURE — 83930 ASSAY OF BLOOD OSMOLALITY: CPT | Performed by: INTERNAL MEDICINE

## 2025-03-07 PROCEDURE — 25000003 PHARM REV CODE 250: Performed by: NURSE PRACTITIONER

## 2025-03-07 PROCEDURE — 63600175 PHARM REV CODE 636 W HCPCS: Performed by: INTERNAL MEDICINE

## 2025-03-07 PROCEDURE — 25000003 PHARM REV CODE 250: Performed by: HOSPITALIST

## 2025-03-07 PROCEDURE — 80048 BASIC METABOLIC PNL TOTAL CA: CPT | Performed by: INTERNAL MEDICINE

## 2025-03-07 PROCEDURE — 97535 SELF CARE MNGMENT TRAINING: CPT

## 2025-03-07 PROCEDURE — 84300 ASSAY OF URINE SODIUM: CPT | Performed by: INTERNAL MEDICINE

## 2025-03-07 PROCEDURE — 97530 THERAPEUTIC ACTIVITIES: CPT | Mod: CQ

## 2025-03-07 PROCEDURE — 85027 COMPLETE CBC AUTOMATED: CPT | Performed by: INTERNAL MEDICINE

## 2025-03-07 RX ORDER — SODIUM CHLORIDE, SODIUM LACTATE, POTASSIUM CHLORIDE, CALCIUM CHLORIDE 600; 310; 30; 20 MG/100ML; MG/100ML; MG/100ML; MG/100ML
INJECTION, SOLUTION INTRAVENOUS CONTINUOUS
Status: ACTIVE | OUTPATIENT
Start: 2025-03-07 | End: 2025-03-08

## 2025-03-07 RX ADMIN — PREDNISONE 5 MG: 5 TABLET ORAL at 08:03

## 2025-03-07 RX ADMIN — SODIUM CHLORIDE, POTASSIUM CHLORIDE, SODIUM LACTATE AND CALCIUM CHLORIDE: 600; 310; 30; 20 INJECTION, SOLUTION INTRAVENOUS at 06:03

## 2025-03-07 RX ADMIN — LEVOTHYROXINE SODIUM 75 MCG: 0.05 TABLET ORAL at 05:03

## 2025-03-07 RX ADMIN — AMLODIPINE BESYLATE 10 MG: 5 TABLET ORAL at 08:03

## 2025-03-07 RX ADMIN — Medication 9 MG: at 08:03

## 2025-03-07 RX ADMIN — MYCOPHENOLATE MOFETIL 500 MG: 250 CAPSULE ORAL at 08:03

## 2025-03-07 NOTE — PT/OT/SLP PROGRESS
Physical Therapy Treatment    Patient Name:  Ana Maria Callejas   MRN:  [id number]    Recommendations:     Discharge therapy intensity: Moderate Intensity Therapy   Discharge Equipment Recommendations: walker, rolling  Barriers to discharge: Impaired mobility and Ongoing medical needs    Assessment:     Ana Maria Callejas is a 74 y.o. female admitted with a medical diagnosis of acute metabolic encephalopathy, scalp hematoma, mild SDH, milk hyponatremia.  She presents with the following impairments/functional limitations: weakness, impaired endurance, impaired self care skills, impaired functional mobility, gait instability, impaired balance, impaired cognition.    Patient with good progress this session. Patient over all SBA/CGA. If 24/7 supervision provided, patient could go home with home health.     Rehab Prognosis: Good; patient would benefit from acute skilled PT services to address these deficits and reach maximum level of function.    Recent Surgery: * No surgery found *      Plan:     During this hospitalization, patient would benefit from acute PT services 5 x/week to address the identified rehab impairments via gait training, therapeutic activities, therapeutic exercises and progress toward the following goals:    Plan of Care Expires:  04/05/25    Subjective     Chief Complaint: none stated  Patient/Family Comments/goals: none stated   Pain/Comfort:         Objective:     Communicated with NSG prior to session.  Patient found  sitting on couch  with peripheral IV upon PT entry to room.     General Precautions: Standard, fall  Orthopedic Precautions: N/A  Braces: N/A  Respiratory Status: Room air  Blood Pressure: NT  Skin Integrity: Visible skin intact      Functional Mobility:  Transfers:     Sit to Stand: SBA - contact guard assistance  with rolling walker  X10 reps - VC for hand placement, patient needing 1-2 attempts before able to stand up  Gait: 100 feet x 2 with RW and CGA; VC for close proximity to RW with  standing rest in between trials.   Balance: SBA for static sitting balance; CGA for static standing balance      Education:  Patient provided with verbal education education regarding PT role/goals/POC, fall prevention, safety awareness, and discharge/DME recommendations.  Understanding was verbalized.     Patient left up in chair with all lines intact, call button in reach, nsg notified, and 1:1 present    GOALS:   Multidisciplinary Problems       Physical Therapy Goals          Problem: Physical Therapy    Goal Priority Disciplines Outcome Interventions   Physical Therapy Goal     PT, PT/OT Progressing    Description: Goals to be met by: 2025     Patient will increase functional independence with mobility by performin. Supine to sit with Stand-by Assistance  2. Sit to stand transfer with Stand-by Assistance  3. Gait  x 150 feet with Stand-by Assistance using Rolling Walker or LRAD.                          Time Tracking:     PT Received On: 25  PT Start Time: 1317     PT Stop Time: 1343  PT Total Time (min): 26 min     Billable Minutes: Gait Training 10 and Therapeutic Activity 16    Treatment Type: Treatment  PT/PTA: PTA     Number of PTA visits since last PT visit: 2     2025

## 2025-03-07 NOTE — PT/OT/SLP EVAL
[facility]  Speech Language Pathology Department  Cognitive-Communication Evaluation    Patient Name:  Ana Maria Callejas   MRN:  [id number]    Recommendations     General recommendations:  cognitive-linguistic therapy  Communication strategies:  provide increased time to answer and go to room if call light pushed    Discharge therapy intensity: Moderate Intensity Therapy  Barriers to safe discharge: safety awareness    History     Ana Maria Callejas is a/n 74 y.o. female admitted for AMS. CTA brain showed no occlusion and MRI of the brain was negative for any acute findings.     No past medical history on file.  No past surgical history on file.    Previous level of Function  Lives: alone  Handed: Left  Glasses: yes  Hearing Aids:  yes (right ear only)    Subjective     Patient awake, alert, and cooperative.  Patient goals: to go home   Spiritual/Cultural/Sabianism Beliefs/Practices that affect care: no    Pain/Comfort: Pain Rating 1: 0/10    Respiratory Status:  room air    Objective     SPEECH PRODUCTION  Phoneme Production: adequate  Voice Quality: adequate  Voice Production: adequate  Speech Rate: appropriate  Loudness: acceptable  Respiration: WFL for speech  Resonance: adequate  Prosody: adequate  Speech Intelligibility  Known Context: Greater that 90%  Unknown Context: Greater that 90%    AUDITORY COMPREHENSION  Following Directions:  1-Step: 100%  2-Step: 100%  Yes/No Questions:  Biographical: 100%  Environmental: 100%  Simple: 100%  Complex: 70%    VERBAL EXPRESSION  Automatic Speech:  Days of the week: Within Functional Limits  Months of the year: Within Functional Limits  Counting: Within Functional Limits  Alphabet: Within Functional Limits  Phrase Completion: 100%  Confrontation Naming  Body Parts: 100%  Objects: 100%  Wh- Questions:  Object name: 100%  Object function: 100%    COGNITION  Orientation:  Person: yes  Place: inconsistent  Time: inconsistent  Situation: yes    Attention:  Focused: Impaired  Sustained: Impaired  Memory:  Immediate: Within Functional Limits  Delayed: Within Functional Limits (10/12 on four word memory test)  Long Term: Within Functional Limits  Problem Solving  Functional simple: Impaired  Organization:  Convergent thinking: Impaired  Divergent thinking: Impaired    Assessment     Pt presents with impaired cognitive linguistic skills which negatively impact safe, independent functioning. Skilled SLP intervention is warranted at this time.     Goals     Multidisciplinary Problems       SLP Goals          Problem: SLP    Goal Priority Disciplines Outcome   SLP Goal     SLP Progressing   Description: LTG: The pt will increase cognitive linguistic skills to increase safety awareness and independence to promote return to PLOF.     STGs:  The pt will answer complex yes/no questions with 100% accuracy and occasional cues.   The pt will be oriented x4 with occasional cues.   The pt will maintain attention to task for 5 minutes with no redirection to task.  The pt will recall 90% of targets with minimal cues following 10 minute filled delay.  The pt will complete convergent/divergent thinking tasks with 80% accuracy and minimal cues.   The pt will complete problem solving tasks with 80% accuracy and minimal cues.   The pt will complete home management tasks with 80% accuracy and minimal cues.                        Patient Education     Patient provided with verbal education regarding SLP POC.  Additional teaching is warranted.    Plan     SLP Follow-Up:  Yes   Patient to be seen:  5 x/week   Plan of Care expires:  03/21/25  Plan of Care reviewed with:  patient      Time Tracking     SLP Treatment Date:   03/07/25  Speech Start Time:  1145  Speech Stop Time:  1204     Speech Total Time (min):  19 min    Billable minutes:  Evaluation of Speech Sound Production with Comprehension and Expression, 19 minutes     03/07/2025

## 2025-03-07 NOTE — PROGRESS NOTES
[facility]  Hospital Medicine Progress Note        Chief Complaint: Inpatient Follow-up     HPI:   73 y.o. adult who  has Lupus presented to [facility] on 3/4/2025 with a primary complaint of being in altered mental status. Per charts patient's family reports recurrent falls over the last several months with the worst falls occurring last November and on this admission.  Family reports intracranial bleed was as a result the fall last November and she lost consciousness during that episode as well.  Patient's daughter-in-law at bedside reports falls prior to November for likely due to orthostasis. Patient's family reports lives alone, ambulates without assistance, and performs ADLs independently at baseline. CT brain showed 1. Mild hyperdensity along the falx.  Differential considerations include trace subdural hemorrhage or calcifications.  In the absence of outside prior for comparison, suggest short interval follow-up head CT to reassess. 2. Chronic cerebral atrophy and small-vessel ischemic change. 3. Left occipital scalp hematoma. CTA brain showed no occulusion. Labs and vitals stable. She will be admitted for further evaluation. Seen by trauma team and cleared. MRI of the brain was negative for any acute findings.     3/5 - One-to-one sitter is at the bedside.  States that she had an okay night though she was little agitated.  She did require some Haldol around 8:00 p.m..  Still confused.  Unclear baseline.      CT head without revealed possible trace SDH.  CTA head/neck unrevealing for flow-limiting stenosis, LVO, or aneurysms.    MRI brain w/o unrevealing for acute intracranial abnormalities.  Labs revealed mild hyponatremia, otherwise unremarkable.  TTE revealed LVEF 60-65% and otherwise unrevealing.    Interval Hx:     AF. NAEON. Sitter in room, discussed with RN to remove sitter if possible  Na 126, low osmo noted, possible hypovolemia related, LR ordered    Discussed with son  3/6    Case was discussed with patient's nurse and  on the floor.    Objective/physical exam:  General: In no acute distress, afebrile  Chest: Clear to auscultation bilaterally  Heart: RRR, +S1, S2, no appreciable murmur  Abdomen: Soft, nontender, BS +  MSK: Warm, no lower extremity edema, no clubbing or cyanosis  Neuro: alert and oriented x 4. No focal deficit.    VITAL SIGNS: 24 HRS MIN & MAX LAST   Temp  Min: 97.5 °F (36.4 °C)  Max: 98.5 °F (36.9 °C) 97.5 °F (36.4 °C)   BP  Min: 109/68  Max: 150/71 126/82   Pulse  Min: 85  Max: 92  91   Resp  Min: 16  Max: 18 16   SpO2  Min: 93 %  Max: 99 % 99 %     I have reviewed the following labs:  Recent Labs   Lab 03/05/25  0636 03/06/25  0402 03/07/25  0755   WBC 4.53 4.06* 4.26*   RBC 3.98* 4.03* 3.87*   HGB 12.5 12.7 12.4   HCT 36.8* 35.8* 34.7*   MCV 92.5 88.8 89.7   MCH 31.4* 31.5* 32.0*   MCHC 34.0 35.5 35.7   RDW 14.4 14.1 13.8   * 109* 111*   MPV 13.6* 12.6* 12.7*     Recent Labs   Lab 03/04/25  1127 03/05/25  0138 03/06/25  0402 03/07/25  0755   * 133* 131* 126*   K 4.4 3.5 3.3* 3.8   CL 96* 100 98 97*   CO2 24 21* 19* 20*   BUN 12.0 6.8* 8.6* 10.0   CREATININE 0.85 0.75 0.65 0.64   CALCIUM 9.2 8.4 8.1* 7.8*   MG  --  2.10  --   --    ALBUMIN 3.8 3.6  --   --    ALKPHOS 58 52  --   --    ALT 17 21  --   --    AST 33 39*  --   --    BILITOT 0.3 0.5  --   --      Microbiology Results (last 7 days)       Procedure Component Value Units Date/Time    Blood Culture [[id number]]  (Normal) Collected: 03/04/25 1604    Order Status: Completed Specimen: Blood Updated: 03/06/25 1801     Blood Culture No Growth At 48 Hours    Blood Culture [[id number]]  (Normal) Collected: 03/04/25 1604    Order Status: Completed Specimen: Blood Updated: 03/06/25 1801     Blood Culture No Growth At 48 Hours             See below for Radiology    Assessment/Plan:    Recurrent falls  - With last two falls involving +LOC   Acute metabolic encephalopathy - improving  Scalp  hematoma 2/2 fall  Subdural hemorrhage   Mild hyponatremia   Lupus on mycophenolate, prednisone  Anxiety    Plan  Encephalopathy improved.   Was taking xanax BID at home, unclear dose, advised to hold it and discuss alternative medication for anxiety  CT head 3/4 - mild subdural hematoma  MRI of the brain 3/5 was unimpressive.  No evidence of intracranial hemorrhage.     Neurology  Ct has a trace of subdural but mri brain negative.   mutiple falls and syncope mostly secondary to orhtostatic bp, sz is possibility but felt less likely   EEG 3/6 - no clear evidence of any epileptiform discharges  Hold off on AED for now    Now on a diet, poor appetite  Na 126, low osmo noted, possible hypovolemia related, LR @100cc/hr for 10 hrs    Continue with p.r.n. agitation meds as needed  Fall precautions    PT/OT  CM for placement    VTE prophylaxis: SCDs    Patient condition: Fair    Anticipated discharge and Disposition:  [facility]       All diagnosis and differential diagnosis have been reviewed; assessment and plan has been documented; I have personally reviewed the labs and test results that are presently available; I have reviewed the patients medication list; I have reviewed the consulting providers response and recommendations. I have reviewed or attempted to review medical records based upon their availability    All of the patient's questions have been  addressed and answered. Patient's is agreeable to the above stated plan. I will continue to monitor closely and make adjustments to medical management as needed.    Portions of this note dictated using EMR integrated voice recognition software, and may be subject to voice recognition errors not corrected at proofreading. Please contact writer for clarification if needed.   _____________________________________________________________________    Malnutrition Status:  Nutrition consulted. Most recent weight and BMI monitored-     Measurements:  Wt Readings from Last 1  Encounters:   03/05/25 52.6 kg (116 lb)   Body mass index is 18.17 kg/m².    Patient has been screened and assessed by RD.    Malnutrition Type:  Context:    Level:      Malnutrition Characteristic Summary:       Interventions/Recommendations (treatment strategy):        Scheduled Med:   amLODIPine  10 mg Oral Daily    levothyroxine  75 mcg Oral Before breakfast    mycophenolate  500 mg Oral BID    predniSONE  5 mg Oral Daily      Continuous Infusions:     PRN Meds:    Current Facility-Administered Medications:     acetaminophen, 1,000 mg, Oral, Q6H PRN    bisacodyL, 10 mg, Rectal, Daily PRN    haloperidol lactate, 5 mg, Intravenous, Q6H PRN    labetalol, 10 mg, Intravenous, Q6H PRN    melatonin, 9 mg, Oral, Nightly PRN    ondansetron, 4 mg, Intravenous, Q8H PRN     Radiology:  I have personally reviewed the following imaging and agree with the radiologist.     MRI Brain Limited Without Contrast  Narrative: EXAMINATION:  MRI BRAIN LIMITED WITHOUT CONTRAST    CLINICAL HISTORY:  Stroke, follow up;stroke follow up;    TECHNIQUE:  Multiplanar multisequence MR imaging of the brain was performed without contrast.    COMPARISON:  CT head without contrast and CT angiogram head and neck 03/04/2025.    FINDINGS:  INTRACRANIAL: Moderate chronic small-vessel ischemic changes of the supratentorial periventricular and subcortical white matter.  No parenchymal restricted diffusion.  No evidence of intracranial hemorrhage.  No extra-axial fluid collection or mass.  No intracranial mass effect.  No hydrocephalus.  Midline structures have a normal configuration.    SINUSES: Mild mucosal thickening of the anterior ethmoid sinuses.  Mastoid air cells are clear.    ORBITS: Visualized orbits are normal.    Other: Redemonstration of a 4.6 cm x 2.4 cm hematoma within the left parietooccipital scalp with surrounding soft tissue swelling.  Impression: No acute intracranial pathology.    Moderate chronic small-vessel ischemic changes of the  supratentorial white matter.    4.6 cm x 2.4 cm hematoma within the left parietooccipital scalp with surrounding soft tissue swelling.    I concur with the preliminary report    Electronically signed by: Diogo Duffy  Date:    03/05/2025  Time:    07:46      Roderick Iglesias MD  Department of Hospital Medicine   [facility]   03/07/2025

## 2025-03-07 NOTE — PLAN OF CARE
Problem: SLP  Goal: SLP Goal  Description: LTG: The pt will increase cognitive linguistic skills to increase safety awareness and independence to promote return to PLOF.     STGs:  The pt will answer complex yes/no questions with 100% accuracy and occasional cues.   The pt will be oriented x4 with occasional cues.   The pt will maintain attention to task for 5 minutes with no redirection to task.  The pt will recall 90% of targets with minimal cues following 10 minute filled delay.  The pt will complete convergent/divergent thinking tasks with 80% accuracy and minimal cues.   The pt will complete problem solving tasks with 80% accuracy and minimal cues.   The pt will complete home management tasks with 80% accuracy and minimal cues.     Outcome: Progressing     Goals and POC initiated.

## 2025-03-07 NOTE — PROGRESS NOTES
[facility]                       [address] [place] [id number]    PATIENT NAME:       KATERINA FRAUSTO  YOB: 1950  CSN:                [id number]   MRN:                [id number]  ADMIT DATE:         2025 11:16:00  PHYSICIAN:          Kevin Ramirez MD                           TESTING    DATE OF SERVICE:  2025 10:18:30    STUDY PERFORMED:  EEG.    INDICATIONS:  Study was done for a seizure workup.    DESCRIPTION:  This long-term monitoring EEG was done using 10/20 systems, using   21 channels.  The background activity is about 7 hertz of moderate amplitude.    Stage 2 sleep was noted briefly with sleep spindles.  There was some EKG   artifact and movement artifact throughout the study.  No clear evidence of any   epileptiform discharges.    CONCLUSION:  This is an abnormal study with presence of diffuse slowing   consistent with mild cerebral dysfunction, which is nonspecific sign, can be   found in toxic, metabolic, and anoxic brain injury.  There is no clear evidence   of any epileptiform discharges.  Clinical correlation suggested.        ______________________________  MD LONG BardalesK/AQS  DD:  2025  Time:  10:01PM  DT:  2025  Time:  10:54PM  Job #:  [id number]       TESTING

## 2025-03-07 NOTE — PLAN OF CARE
CM gave brown envelope with SNF choice list to Hoda, patient's nurse to give to Mikeldavid patient's son who is coming this evening.

## 2025-03-08 LAB
ANION GAP SERPL CALC-SCNC: 8 MEQ/L
ANION GAP SERPL CALC-SCNC: 9 MEQ/L
ANION GAP SERPL CALC-SCNC: 9 MEQ/L
BUN SERPL-MCNC: 8.1 MG/DL (ref 9.8–20.1)
BUN SERPL-MCNC: 8.2 MG/DL (ref 9.8–20.1)
BUN SERPL-MCNC: 8.5 MG/DL (ref 9.8–20.1)
CALCIUM SERPL-MCNC: 7.9 MG/DL (ref 8.4–10.2)
CALCIUM SERPL-MCNC: 8 MG/DL (ref 8.4–10.2)
CALCIUM SERPL-MCNC: 8.1 MG/DL (ref 8.4–10.2)
CHLORIDE SERPL-SCNC: 95 MMOL/L (ref 98–107)
CHLORIDE SERPL-SCNC: 96 MMOL/L (ref 98–107)
CHLORIDE SERPL-SCNC: 98 MMOL/L (ref 98–107)
CO2 SERPL-SCNC: 20 MMOL/L (ref 23–31)
CO2 SERPL-SCNC: 21 MMOL/L (ref 23–31)
CO2 SERPL-SCNC: 22 MMOL/L (ref 23–31)
CREAT SERPL-MCNC: 0.62 MG/DL (ref 0.55–1.02)
CREAT SERPL-MCNC: 0.63 MG/DL (ref 0.55–1.02)
CREAT SERPL-MCNC: 0.67 MG/DL (ref 0.55–1.02)
CREAT/UREA NIT SERPL: 12
CREAT/UREA NIT SERPL: 13
CREAT/UREA NIT SERPL: 13
GFR SERPLBLD CREATININE-BSD FMLA CKD-EPI: >60 ML/MIN/1.73/M2
GLUCOSE SERPL-MCNC: 109 MG/DL (ref 82–115)
GLUCOSE SERPL-MCNC: 85 MG/DL (ref 82–115)
GLUCOSE SERPL-MCNC: 96 MG/DL (ref 82–115)
POTASSIUM SERPL-SCNC: 3.5 MMOL/L (ref 3.5–5.1)
POTASSIUM SERPL-SCNC: 3.7 MMOL/L (ref 3.5–5.1)
POTASSIUM SERPL-SCNC: 3.9 MMOL/L (ref 3.5–5.1)
SODIUM SERPL-SCNC: 124 MMOL/L (ref 136–145)
SODIUM SERPL-SCNC: 127 MMOL/L (ref 136–145)
SODIUM SERPL-SCNC: 127 MMOL/L (ref 136–145)

## 2025-03-08 PROCEDURE — 63600175 PHARM REV CODE 636 W HCPCS: Performed by: HOSPITALIST

## 2025-03-08 PROCEDURE — 80048 BASIC METABOLIC PNL TOTAL CA: CPT | Performed by: INTERNAL MEDICINE

## 2025-03-08 PROCEDURE — 25000003 PHARM REV CODE 250: Performed by: NURSE PRACTITIONER

## 2025-03-08 PROCEDURE — 25000003 PHARM REV CODE 250: Performed by: INTERNAL MEDICINE

## 2025-03-08 PROCEDURE — 36415 COLL VENOUS BLD VENIPUNCTURE: CPT | Performed by: INTERNAL MEDICINE

## 2025-03-08 PROCEDURE — 21400001 HC TELEMETRY ROOM

## 2025-03-08 PROCEDURE — 25000003 PHARM REV CODE 250: Performed by: HOSPITALIST

## 2025-03-08 PROCEDURE — 63600175 PHARM REV CODE 636 W HCPCS: Performed by: INTERNAL MEDICINE

## 2025-03-08 RX ORDER — SODIUM CHLORIDE 1 G/1
1000 TABLET ORAL ONCE
Status: COMPLETED | OUTPATIENT
Start: 2025-03-08 | End: 2025-03-08

## 2025-03-08 RX ORDER — SODIUM BICARBONATE 650 MG/1
650 TABLET ORAL 2 TIMES DAILY
Status: DISCONTINUED | OUTPATIENT
Start: 2025-03-08 | End: 2025-03-08

## 2025-03-08 RX ORDER — SODIUM BICARBONATE 650 MG/1
650 TABLET ORAL 2 TIMES DAILY
Status: DISCONTINUED | OUTPATIENT
Start: 2025-03-09 | End: 2025-03-10

## 2025-03-08 RX ORDER — SODIUM CHLORIDE 1 G/1
1000 TABLET ORAL 2 TIMES DAILY
Status: DISCONTINUED | OUTPATIENT
Start: 2025-03-08 | End: 2025-03-18 | Stop reason: HOSPADM

## 2025-03-08 RX ORDER — SODIUM CHLORIDE, SODIUM LACTATE, POTASSIUM CHLORIDE, CALCIUM CHLORIDE 600; 310; 30; 20 MG/100ML; MG/100ML; MG/100ML; MG/100ML
INJECTION, SOLUTION INTRAVENOUS CONTINUOUS
Status: DISCONTINUED | OUTPATIENT
Start: 2025-03-08 | End: 2025-03-08

## 2025-03-08 RX ORDER — LOPERAMIDE HYDROCHLORIDE 2 MG/1
2 CAPSULE ORAL ONCE
Status: COMPLETED | OUTPATIENT
Start: 2025-03-08 | End: 2025-03-08

## 2025-03-08 RX ADMIN — LEVOTHYROXINE SODIUM 75 MCG: 0.05 TABLET ORAL at 05:03

## 2025-03-08 RX ADMIN — LOPERAMIDE HYDROCHLORIDE 2 MG: 2 CAPSULE ORAL at 08:03

## 2025-03-08 RX ADMIN — SODIUM CHLORIDE 1000 MG: 1 TABLET ORAL at 06:03

## 2025-03-08 RX ADMIN — MYCOPHENOLATE MOFETIL 500 MG: 250 CAPSULE ORAL at 08:03

## 2025-03-08 RX ADMIN — AMLODIPINE BESYLATE 10 MG: 5 TABLET ORAL at 08:03

## 2025-03-08 RX ADMIN — Medication 9 MG: at 08:03

## 2025-03-08 RX ADMIN — SODIUM CHLORIDE 1000 MG: 1 TABLET ORAL at 08:03

## 2025-03-08 RX ADMIN — SODIUM CHLORIDE, POTASSIUM CHLORIDE, SODIUM LACTATE AND CALCIUM CHLORIDE: 600; 310; 30; 20 INJECTION, SOLUTION INTRAVENOUS at 09:03

## 2025-03-08 RX ADMIN — PREDNISONE 5 MG: 5 TABLET ORAL at 08:03

## 2025-03-08 NOTE — PROGRESS NOTES
[facility] Hospital Medicine Progress Note        Chief Complaint: Inpatient Follow-up     HPI:   73 y.o. adult who  has Lupus presented to [facility] on 3/4/2025 with a primary complaint of being in altered mental status. Per charts patient's family reports recurrent falls over the last several months with the worst falls occurring last November and on this admission.  Family reports intracranial bleed was as a result the fall last November and she lost consciousness during that episode as well.  Patient's daughter-in-law at bedside reports falls prior to November for likely due to orthostasis. Patient's family reports lives alone, ambulates without assistance, and performs ADLs independently at baseline. CT brain showed 1. Mild hyperdensity along the falx.  Differential considerations include trace subdural hemorrhage or calcifications.  In the absence of outside prior for comparison, suggest short interval follow-up head CT to reassess. 2. Chronic cerebral atrophy and small-vessel ischemic change. 3. Left occipital scalp hematoma. CTA brain showed no occulusion. Labs and vitals stable. She will be admitted for further evaluation. Seen by trauma team and cleared. MRI of the brain was negative for any acute findings.     3/5 - One-to-one sitter is at the bedside.  States that she had an okay night though she was little agitated.  She did require some Haldol around 8:00 p.m..  Still confused.  Unclear baseline.      CT head without revealed possible trace SDH.  CTA head/neck unrevealing for flow-limiting stenosis, LVO, or aneurysms.    MRI brain w/o unrevealing for acute intracranial abnormalities.  Labs revealed mild hyponatremia, otherwise unremarkable.  TTE revealed LVEF 60-65% and otherwise unrevealing.    Interval Hx:     AF. NAEON. Now with tele sitter.  Na 126 >>127,low osmo noted, possible hypovolemia related. some improvement with LR.   Repeat Na 124, hold LR, renal consulted, ?  Possible SIADH    Case was discussed with patient's nurse and  on the floor.    Objective/physical exam:  General: In no acute distress, afebrile  Chest: Clear to auscultation bilaterally  Heart: RRR, +S1, S2, no appreciable murmur  Abdomen: Soft, nontender, BS +  MSK: Warm, no lower extremity edema, no clubbing or cyanosis  Neuro: alert and oriented x 4. No focal deficit.    VITAL SIGNS: 24 HRS MIN & MAX LAST   Temp  Min: 98.1 °F (36.7 °C)  Max: 98.8 °F (37.1 °C) 98.3 °F (36.8 °C)   BP  Min: 94/59  Max: 150/81 (!) 94/59   Pulse  Min: 83  Max: 99  92   Resp  Min: 16  Max: 17 16   SpO2  Min: 94 %  Max: 98 % (!) 94 %     I have reviewed the following labs:  Recent Labs   Lab 03/05/25  0636 03/06/25  0402 03/07/25  0755   WBC 4.53 4.06* 4.26*   RBC 3.98* 4.03* 3.87*   HGB 12.5 12.7 12.4   HCT 36.8* 35.8* 34.7*   MCV 92.5 88.8 89.7   MCH 31.4* 31.5* 32.0*   MCHC 34.0 35.5 35.7   RDW 14.4 14.1 13.8   * 109* 111*   MPV 13.6* 12.6* 12.7*     Recent Labs   Lab 03/04/25  1127 03/05/25  0138 03/06/25  0402 03/07/25  0755 03/08/25  0752   * 133* 131* 126* 127*   K 4.4 3.5 3.3* 3.8 3.9   CL 96* 100 98 97* 96*   CO2 24 21* 19* 20* 22*   BUN 12.0 6.8* 8.6* 10.0 8.2*   CREATININE 0.85 0.75 0.65 0.64 0.62   CALCIUM 9.2 8.4 8.1* 7.8* 7.9*   MG  --  2.10  --   --   --    ALBUMIN 3.8 3.6  --   --   --    ALKPHOS 58 52  --   --   --    ALT 17 21  --   --   --    AST 33 39*  --   --   --    BILITOT 0.3 0.5  --   --   --      Microbiology Results (last 7 days)       Procedure Component Value Units Date/Time    Blood Culture [[id number]]  (Normal) Collected: 03/04/25 1604    Order Status: Completed Specimen: Blood Updated: 03/07/25 1801     Blood Culture No Growth At 72 Hours    Blood Culture [[id number]]  (Normal) Collected: 03/04/25 1604    Order Status: Completed Specimen: Blood Updated: 03/07/25 1801     Blood Culture No Growth At 72 Hours             See below for Radiology    Assessment/Plan:    Recurrent  falls  - With last two falls involving +LOC   Acute metabolic encephalopathy - improving  Scalp hematoma 2/2 fall  Subdural hemorrhage   Mild hyponatremia   Lupus on mycophenolate, prednisone  Anxiety    Plan  Encephalopathy improved.   Was taking xanax BID at home, unclear dose, advised to hold it and discuss alternative medication for anxiety  CT head 3/4 - mild subdural hematoma  MRI of the brain 3/5 was unimpressive.  No evidence of intracranial hemorrhage.     Neurology  Ct has a trace of subdural but mri brain negative.   mutiple falls and syncope mostly secondary to orhtostatic bp, sz is possibility but felt less likely   EEG 3/6 - no clear evidence of any epileptiform discharges  Hold off on AED for now    Now on a diet, poor appetite  Na 126 >>127,low osmo noted, possible hypovolemia related. some improvement with LR  Repeat Na 124, hold LR, renal consulted, ? Possible SIADH    Continue with p.r.n. agitation meds as needed  Fall precautions    PT/OT  CM for placement    VTE prophylaxis: SCDs    Patient condition: Fair    Anticipated discharge and Disposition:  SNF       All diagnosis and differential diagnosis have been reviewed; assessment and plan has been documented; I have personally reviewed the labs and test results that are presently available; I have reviewed the patients medication list; I have reviewed the consulting providers response and recommendations. I have reviewed or attempted to review medical records based upon their availability    All of the patient's questions have been  addressed and answered. Patient's is agreeable to the above stated plan. I will continue to monitor closely and make adjustments to medical management as needed.    Portions of this note dictated using EMR integrated voice recognition software, and may be subject to voice recognition errors not corrected at proofreading. Please contact writer for clarification if needed.    _____________________________________________________________________    Malnutrition Status:  Nutrition consulted. Most recent weight and BMI monitored-     Measurements:  Wt Readings from Last 1 Encounters:   03/05/25 52.6 kg (116 lb)   Body mass index is 18.17 kg/m².    Patient has been screened and assessed by RD.    Malnutrition Type:  Context:    Level:      Malnutrition Characteristic Summary:       Interventions/Recommendations (treatment strategy):        Scheduled Med:   amLODIPine  10 mg Oral Daily    levothyroxine  75 mcg Oral Before breakfast    mycophenolate  500 mg Oral BID    predniSONE  5 mg Oral Daily      Continuous Infusions:   lactated ringers   Intravenous Continuous 75 mL/hr at 03/08/25 0958 New Bag at 03/08/25 0958      PRN Meds:    Current Facility-Administered Medications:     acetaminophen, 1,000 mg, Oral, Q6H PRN    bisacodyL, 10 mg, Rectal, Daily PRN    haloperidol lactate, 5 mg, Intravenous, Q6H PRN    labetalol, 10 mg, Intravenous, Q6H PRN    melatonin, 9 mg, Oral, Nightly PRN    ondansetron, 4 mg, Intravenous, Q8H PRN     Radiology:  I have personally reviewed the following imaging and agree with the radiologist.     MRI Brain Limited Without Contrast  Narrative: EXAMINATION:  MRI BRAIN LIMITED WITHOUT CONTRAST    CLINICAL HISTORY:  Stroke, follow up;stroke follow up;    TECHNIQUE:  Multiplanar multisequence MR imaging of the brain was performed without contrast.    COMPARISON:  CT head without contrast and CT angiogram head and neck 03/04/2025.    FINDINGS:  INTRACRANIAL: Moderate chronic small-vessel ischemic changes of the supratentorial periventricular and subcortical white matter.  No parenchymal restricted diffusion.  No evidence of intracranial hemorrhage.  No extra-axial fluid collection or mass.  No intracranial mass effect.  No hydrocephalus.  Midline structures have a normal configuration.    SINUSES: Mild mucosal thickening of the anterior ethmoid sinuses.  Mastoid air  cells are clear.    ORBITS: Visualized orbits are normal.    Other: Redemonstration of a 4.6 cm x 2.4 cm hematoma within the left parietooccipital scalp with surrounding soft tissue swelling.  Impression: No acute intracranial pathology.    Moderate chronic small-vessel ischemic changes of the supratentorial white matter.    4.6 cm x 2.4 cm hematoma within the left parietooccipital scalp with surrounding soft tissue swelling.    I concur with the preliminary report    Electronically signed by: Diogo Duffy  Date:    03/05/2025  Time:    07:46      Roderick Iglesias MD  Department of Hospital Medicine   [facility]   03/08/2025

## 2025-03-09 LAB
ALBUMIN SERPL-MCNC: 3.4 G/DL (ref 3.4–4.8)
ALBUMIN/GLOB SERPL: 1.4 RATIO (ref 1.1–2)
ALP SERPL-CCNC: 51 UNIT/L (ref 40–150)
ALT SERPL-CCNC: 19 UNIT/L (ref 0–55)
ANION GAP SERPL CALC-SCNC: 7 MEQ/L
AST SERPL-CCNC: 26 UNIT/L (ref 5–34)
BACTERIA BLD CULT: NORMAL
BACTERIA BLD CULT: NORMAL
BILIRUB SERPL-MCNC: 0.7 MG/DL
BUN SERPL-MCNC: 6.1 MG/DL (ref 9.8–20.1)
CALCIUM SERPL-MCNC: 8 MG/DL (ref 8.4–10.2)
CHLORIDE SERPL-SCNC: 98 MMOL/L (ref 98–107)
CO2 SERPL-SCNC: 22 MMOL/L (ref 23–31)
CREAT SERPL-MCNC: 0.64 MG/DL (ref 0.55–1.02)
CREAT/UREA NIT SERPL: 10
ERYTHROCYTE [DISTWIDTH] IN BLOOD BY AUTOMATED COUNT: 13.6 % (ref 11.5–17)
GFR SERPLBLD CREATININE-BSD FMLA CKD-EPI: >60 ML/MIN/1.73/M2
GLOBULIN SER-MCNC: 2.5 GM/DL (ref 2.4–3.5)
GLUCOSE SERPL-MCNC: 86 MG/DL (ref 82–115)
HCT VFR BLD AUTO: 34.3 % (ref 37–47)
HGB BLD-MCNC: 12.2 G/DL (ref 12–16)
MCH RBC QN AUTO: 31 PG (ref 27–31)
MCHC RBC AUTO-ENTMCNC: 35.6 G/DL (ref 33–36)
MCV RBC AUTO: 87.1 FL (ref 80–94)
NRBC BLD AUTO-RTO: 0 %
PLATELET # BLD AUTO: 115 X10(3)/MCL (ref 130–400)
PMV BLD AUTO: 12.7 FL (ref 7.4–10.4)
POTASSIUM SERPL-SCNC: 3.4 MMOL/L (ref 3.5–5.1)
PROT SERPL-MCNC: 5.9 GM/DL (ref 5.8–7.6)
RBC # BLD AUTO: 3.94 X10(6)/MCL (ref 4.2–5.4)
SODIUM SERPL-SCNC: 127 MMOL/L (ref 136–145)
WBC # BLD AUTO: 3.48 X10(3)/MCL (ref 4.5–11.5)

## 2025-03-09 PROCEDURE — 21400001 HC TELEMETRY ROOM

## 2025-03-09 PROCEDURE — 25000003 PHARM REV CODE 250: Performed by: INTERNAL MEDICINE

## 2025-03-09 PROCEDURE — 25000003 PHARM REV CODE 250: Performed by: HOSPITALIST

## 2025-03-09 PROCEDURE — 25000003 PHARM REV CODE 250: Performed by: NURSE PRACTITIONER

## 2025-03-09 PROCEDURE — 85027 COMPLETE CBC AUTOMATED: CPT | Performed by: INTERNAL MEDICINE

## 2025-03-09 PROCEDURE — 80053 COMPREHEN METABOLIC PANEL: CPT | Performed by: NURSE PRACTITIONER

## 2025-03-09 PROCEDURE — 63600175 PHARM REV CODE 636 W HCPCS: Performed by: HOSPITALIST

## 2025-03-09 PROCEDURE — 36415 COLL VENOUS BLD VENIPUNCTURE: CPT | Performed by: INTERNAL MEDICINE

## 2025-03-09 RX ORDER — POTASSIUM CHLORIDE 20 MEQ/1
20 TABLET, EXTENDED RELEASE ORAL ONCE
Status: COMPLETED | OUTPATIENT
Start: 2025-03-09 | End: 2025-03-09

## 2025-03-09 RX ADMIN — PREDNISONE 5 MG: 5 TABLET ORAL at 08:03

## 2025-03-09 RX ADMIN — Medication 9 MG: at 08:03

## 2025-03-09 RX ADMIN — SODIUM BICARBONATE 650 MG TABLET 650 MG: at 08:03

## 2025-03-09 RX ADMIN — POTASSIUM CHLORIDE 20 MEQ: 1500 TABLET, EXTENDED RELEASE ORAL at 08:03

## 2025-03-09 RX ADMIN — LEVOTHYROXINE SODIUM 75 MCG: 0.05 TABLET ORAL at 05:03

## 2025-03-09 RX ADMIN — MYCOPHENOLATE MOFETIL 500 MG: 250 CAPSULE ORAL at 08:03

## 2025-03-09 RX ADMIN — SODIUM CHLORIDE 1000 MG: 1 TABLET ORAL at 08:03

## 2025-03-09 NOTE — PROGRESS NOTES
[facility]  Hospital Medicine Progress Note        Chief Complaint: Inpatient Follow-up     HPI:   73 y.o. adult who  has Lupus presented to [facility] on 3/4/2025 with a primary complaint of being in altered mental status. Per charts patient's family reports recurrent falls over the last several months with the worst falls occurring last November and on this admission.  Family reports intracranial bleed was as a result the fall last November and she lost consciousness during that episode as well.  Patient's daughter-in-law at bedside reports falls prior to November for likely due to orthostasis. Patient's family reports lives alone, ambulates without assistance, and performs ADLs independently at baseline. CT brain showed 1. Mild hyperdensity along the falx.  Differential considerations include trace subdural hemorrhage or calcifications.  In the absence of outside prior for comparison, suggest short interval follow-up head CT to reassess. 2. Chronic cerebral atrophy and small-vessel ischemic change. 3. Left occipital scalp hematoma. CTA brain showed no occulusion. Labs and vitals stable. She will be admitted for further evaluation. Seen by trauma team and cleared. MRI of the brain was negative for any acute findings.     3/5 - One-to-one sitter is at the bedside.  States that she had an okay night though she was little agitated.  She did require some Haldol around 8:00 p.m..  Still confused.  Unclear baseline.      CT head without revealed possible trace SDH.  CTA head/neck unrevealing for flow-limiting stenosis, LVO, or aneurysms.    MRI brain w/o unrevealing for acute intracranial abnormalities.  Labs revealed mild hyponatremia, otherwise unremarkable.  TTE revealed LVEF 60-65% and otherwise unrevealing.    Interval Hx:     AF. NAEON. Now with tele sitter. Denies headache, slight tenderness on Scalp hematoma.  Na 126 >>127>>124>127. Improved with salt tabs, possible SIADH.     Case was  discussed with patient's nurse and  on the floor.    Objective/physical exam:  General: In no acute distress, afebrile  Chest: Clear to auscultation bilaterally  Heart: RRR, +S1, S2, no appreciable murmur  Abdomen: Soft, nontender, BS +  MSK: Warm, no lower extremity edema, no clubbing or cyanosis  Neuro: alert and oriented x 4. No focal deficit.    VITAL SIGNS: 24 HRS MIN & MAX LAST   Temp  Min: 97.6 °F (36.4 °C)  Max: 98.5 °F (36.9 °C) 98.5 °F (36.9 °C)   BP  Min: 100/60  Max: 122/64 122/64   Pulse  Min: 91  Max: 94  93   Resp  Min: 17  Max: 18 18   SpO2  Min: 94 %  Max: 98 % 96 %     I have reviewed the following labs:  Recent Labs   Lab 03/06/25  0402 03/07/25  0755 03/09/25  0457   WBC 4.06* 4.26* 3.48*   RBC 4.03* 3.87* 3.94*   HGB 12.7 12.4 12.2   HCT 35.8* 34.7* 34.3*   MCV 88.8 89.7 87.1   MCH 31.5* 32.0* 31.0   MCHC 35.5 35.7 35.6   RDW 14.1 13.8 13.6   * 111* 115*   MPV 12.6* 12.7* 12.7*     Recent Labs   Lab 03/04/25  1127 03/05/25  0138 03/06/25  0402 03/08/25  1356 03/08/25  2213 03/09/25  0457   * 133*   < > 124* 127* 127*   K 4.4 3.5   < > 3.7 3.5 3.4*   CL 96* 100   < > 95* 98 98   CO2 24 21*   < > 20* 21* 22*   BUN 12.0 6.8*   < > 8.5* 8.1* 6.1*   CREATININE 0.85 0.75   < > 0.63 0.67 0.64   CALCIUM 9.2 8.4   < > 8.0* 8.1* 8.0*   MG  --  2.10  --   --   --   --    ALBUMIN 3.8 3.6  --   --   --  3.4   ALKPHOS 58 52  --   --   --  51   ALT 17 21  --   --   --  19   AST 33 39*  --   --   --  26   BILITOT 0.3 0.5  --   --   --  0.7    < > = values in this interval not displayed.     Microbiology Results (last 7 days)       Procedure Component Value Units Date/Time    Blood Culture [[id number]]  (Normal) Collected: 03/04/25 1604    Order Status: Completed Specimen: Blood Updated: 03/08/25 1801     Blood Culture No Growth At 96 Hours    Blood Culture [[id number]]  (Normal) Collected: 03/04/25 1604    Order Status: Completed Specimen: Blood Updated: 03/08/25 1801     Blood Culture  No Growth At 96 Hours             See below for Radiology    Assessment/Plan:    Recurrent falls  - With last two falls involving +LOC   Acute metabolic encephalopathy - improved  Scalp hematoma 2/2 fall  Subdural hemorrhage   Hyponatremia   Lupus on mycophenolate, prednisone  Anxiety    Plan  Encephalopathy improved.   Was taking xanax BID at home, unclear dose, advised to hold it and discuss alternative medication for anxiety  CT head 3/4 - mild subdural hematoma  MRI of the brain 3/5 was unimpressive.  No evidence of intracranial hemorrhage.     Neurology  Ct has a trace of subdural but mri brain negative.   mutiple falls and syncope mostly secondary to orhtostatic bp, sz is possibility but felt less likely   EEG 3/6 - no clear evidence of any epileptiform discharges  Hold off on AED for now    Renal for hyponatremia  With sodium drop in the setting of isotonic fluids use and with subdural hemorrhage- concerning for SIADH   Keep salt tablets twice a day with sodium slowly improving since starting salt tablets  Start p.o. sodium bicarbonate   Replace potassium   Start free water restriction 1500 mL  TSH level was normal recently   Check cortisol level in the morning    Now on a diet  Continue with p.r.n. agitation meds as needed  Fall precautions    PT/OT  CM for placement    VTE prophylaxis: SCDs    Patient condition: Fair    Anticipated discharge and Disposition:  [facility]       All diagnosis and differential diagnosis have been reviewed; assessment and plan has been documented; I have personally reviewed the labs and test results that are presently available; I have reviewed the patients medication list; I have reviewed the consulting providers response and recommendations. I have reviewed or attempted to review medical records based upon their availability    All of the patient's questions have been  addressed and answered. Patient's is agreeable to the above stated plan. I will continue to monitor closely and make  adjustments to medical management as needed.    Portions of this note dictated using EMR integrated voice recognition software, and may be subject to voice recognition errors not corrected at proofreading. Please contact writer for clarification if needed.   _____________________________________________________________________    Malnutrition Status:  Nutrition consulted. Most recent weight and BMI monitored-     Measurements:  Wt Readings from Last 1 Encounters:   03/05/25 52.6 kg (116 lb)   Body mass index is 18.17 kg/m².    Patient has been screened and assessed by RD.    Malnutrition Type:  Context:    Level:      Malnutrition Characteristic Summary:       Interventions/Recommendations (treatment strategy):        Scheduled Med:   levothyroxine  75 mcg Oral Before breakfast    mycophenolate  500 mg Oral BID    predniSONE  5 mg Oral Daily    sodium bicarbonate  650 mg Oral BID    sodium chloride  1,000 mg Oral BID      Continuous Infusions:       PRN Meds:    Current Facility-Administered Medications:     acetaminophen, 1,000 mg, Oral, Q6H PRN    bisacodyL, 10 mg, Rectal, Daily PRN    haloperidol lactate, 5 mg, Intravenous, Q6H PRN    labetalol, 10 mg, Intravenous, Q6H PRN    melatonin, 9 mg, Oral, Nightly PRN    ondansetron, 4 mg, Intravenous, Q8H PRN     Radiology:  I have personally reviewed the following imaging and agree with the radiologist.     MRI Brain Limited Without Contrast  Narrative: EXAMINATION:  MRI BRAIN LIMITED WITHOUT CONTRAST    CLINICAL HISTORY:  Stroke, follow up;stroke follow up;    TECHNIQUE:  Multiplanar multisequence MR imaging of the brain was performed without contrast.    COMPARISON:  CT head without contrast and CT angiogram head and neck 03/04/2025.    FINDINGS:  INTRACRANIAL: Moderate chronic small-vessel ischemic changes of the supratentorial periventricular and subcortical white matter.  No parenchymal restricted diffusion.  No evidence of intracranial hemorrhage.  No extra-axial  fluid collection or mass.  No intracranial mass effect.  No hydrocephalus.  Midline structures have a normal configuration.    SINUSES: Mild mucosal thickening of the anterior ethmoid sinuses.  Mastoid air cells are clear.    ORBITS: Visualized orbits are normal.    Other: Redemonstration of a 4.6 cm x 2.4 cm hematoma within the left parietooccipital scalp with surrounding soft tissue swelling.  Impression: No acute intracranial pathology.    Moderate chronic small-vessel ischemic changes of the supratentorial white matter.    4.6 cm x 2.4 cm hematoma within the left parietooccipital scalp with surrounding soft tissue swelling.    I concur with the preliminary report    Electronically signed by: Diogo Duffy  Date:    03/05/2025  Time:    07:46      Roderick Iglesias MD  Department of Hospital Medicine   [facility]   03/09/2025

## 2025-03-09 NOTE — CONSULTS
Nephrology Consult  [facility]      Patient Name: Ana Maria Callejas  Age: 74 y.o.  : 1950  MRN: [id number]  Admission Date: 3/4/2025      Reason for Consult:     Hyponatremia    HPI:  Ana Maria Callejas is a 74 y.o. female presenting to the ED BM in initially on 2025 following a fall.  No history of chronic medical illness and found in patient poor historian.  On admit CT of the head revealed possible trace subdural hematoma.  CTA was unrevealing for any stenosis, LV or aneurysm.  On admit labs she had some hyponatremia, which has worsened in the past couple of days.  We are consulted for management of hyponatremia.  Admit the UA relatively unremarkable.  Urine sodium was 40, urine osmolality 619, serum osmolality low at 261.  On a.m. labs sodium was stable at 127, potassium low at 3.4 serum CO2 is okay at 22.  Renal indices normal with BUN 6 and creatinine 0.64.  He is awake alert and oriented x3 lying in bed.  She denies any shortness of breath, chest pain, nausea or vomiting.  Appetite is fair.    PCP:  No primary care provider on file.    Review of patient's allergies indicates:  Not on File    Current Medications[1]  Prescriptions Prior to Admission[2]    Past Medical History:  No past medical history on file.    Past Surgical History:  No past surgical history on file.    Family History:  No family history on file.    Social History:  Social History     Tobacco Use    Smoking status: Not on file    Smokeless tobacco: Not on file   Substance Use Topics    Alcohol use: Not on file          Review of Systems:    Review of Systems   Constitutional:  Positive for appetite change.   HENT: Negative.     Eyes: Negative.    Respiratory: Negative.     Cardiovascular: Negative.    Gastrointestinal: Negative.    Endocrine: Negative.    Genitourinary: Negative.    Musculoskeletal: Negative.    Skin: Negative.    Neurological:  Positive for weakness.   Psychiatric/Behavioral:  Positive for confusion.         Objective:    VITAL SIGNS: 24 HR MIN & MAX LAST  Temp  Min: 97.6 °F (36.4 °C)  Max: 98.3 °F (36.8 °C) 97.6 °F (36.4 °C)  BP  Min: 94/59  Max: 122/64 122/64  Pulse  Min: 91  Max: 94 91  Resp  Min: 17  Max: 18 18  SpO2  Min: 94 %  Max: 98 % 98 %    No intake or output data in the 24 hours ending 03/09/25 0933    Physical Exam  Constitutional:       Comments: Thin WF   HENT:      Head: Normocephalic.      Comments: Bruising noted across base of skull and to posterior cervical region     Mouth/Throat:      Mouth: Mucous membranes are dry.   Cardiovascular:      Rate and Rhythm: Normal rate and regular rhythm.   Pulmonary:      Effort: Pulmonary effort is normal.      Breath sounds: Normal breath sounds.   Abdominal:      General: Abdomen is flat. Bowel sounds are normal.      Palpations: Abdomen is soft.   Musculoskeletal:         General: Normal range of motion.      Cervical back: Neck supple. Tenderness present.      Right lower leg: No edema.      Left lower leg: No edema.   Skin:     General: Skin is warm and dry.   Neurological:      Mental Status: She is alert and oriented to person, place, and time.       Dialysis Access: none    Recent Results (from the past 24 hours)   Basic Metabolic Panel    Collection Time: 03/08/25  1:56 PM   Result Value Ref Range    Sodium 124 (L) 136 - 145 mmol/L    Potassium 3.7 3.5 - 5.1 mmol/L    Chloride 95 (L) 98 - 107 mmol/L    CO2 20 (L) 23 - 31 mmol/L    Glucose 109 82 - 115 mg/dL    Blood Urea Nitrogen 8.5 (L) 9.8 - 20.1 mg/dL    Creatinine 0.63 0.55 - 1.02 mg/dL    BUN/Creatinine Ratio 13     Calcium 8.0 (L) 8.4 - 10.2 mg/dL    Anion Gap 9.0 mEq/L    eGFR >60 mL/min/1.73/m2   Basic Metabolic Panel    Collection Time: 03/08/25 10:13 PM   Result Value Ref Range    Sodium 127 (L) 136 - 145 mmol/L    Potassium 3.5 3.5 - 5.1 mmol/L    Chloride 98 98 - 107 mmol/L    CO2 21 (L) 23 - 31 mmol/L    Glucose 85 82 - 115 mg/dL    Blood Urea Nitrogen 8.1 (L) 9.8 - 20.1 mg/dL     Creatinine 0.67 0.55 - 1.02 mg/dL    BUN/Creatinine Ratio 12     Calcium 8.1 (L) 8.4 - 10.2 mg/dL    Anion Gap 8.0 mEq/L    eGFR >60 mL/min/1.73/m2   CBC Without Differential    Collection Time: 03/09/25  4:57 AM   Result Value Ref Range    WBC 3.48 (L) 4.50 - 11.50 x10(3)/mcL    RBC 3.94 (L) 4.20 - 5.40 x10(6)/mcL    Hgb 12.2 12.0 - 16.0 g/dL    Hct 34.3 (L) 37.0 - 47.0 %    MCV 87.1 80.0 - 94.0 fL    MCH 31.0 27.0 - 31.0 pg    MCHC 35.6 33.0 - 36.0 g/dL    RDW 13.6 11.5 - 17.0 %    Platelet 115 (L) 130 - 400 x10(3)/mcL    MPV 12.7 (H) 7.4 - 10.4 fL    NRBC% 0.0 %   Comprehensive Metabolic Panel    Collection Time: 03/09/25  4:57 AM   Result Value Ref Range    Sodium 127 (L) 136 - 145 mmol/L    Potassium 3.4 (L) 3.5 - 5.1 mmol/L    Chloride 98 98 - 107 mmol/L    CO2 22 (L) 23 - 31 mmol/L    Glucose 86 82 - 115 mg/dL    Blood Urea Nitrogen 6.1 (L) 9.8 - 20.1 mg/dL    Creatinine 0.64 0.55 - 1.02 mg/dL    Calcium 8.0 (L) 8.4 - 10.2 mg/dL    Protein Total 5.9 5.8 - 7.6 gm/dL    Albumin 3.4 3.4 - 4.8 g/dL    Globulin 2.5 2.4 - 3.5 gm/dL    Albumin/Globulin Ratio 1.4 1.1 - 2.0 ratio    Bilirubin Total 0.7 <=1.5 mg/dL    ALP 51 40 - 150 unit/L    ALT 19 0 - 55 unit/L    AST 26 5 - 34 unit/L    eGFR >60 mL/min/1.73/m2    Anion Gap 7.0 mEq/L    BUN/Creatinine Ratio 10        MRI Brain Limited Without Contrast  Result Date: 3/5/2025  EXAMINATION: MRI BRAIN LIMITED WITHOUT CONTRAST CLINICAL HISTORY: Stroke, follow up;stroke follow up; TECHNIQUE: Multiplanar multisequence MR imaging of the brain was performed without contrast. COMPARISON: CT head without contrast and CT angiogram head and neck 03/04/2025. FINDINGS: INTRACRANIAL: Moderate chronic small-vessel ischemic changes of the supratentorial periventricular and subcortical white matter.  No parenchymal restricted diffusion.  No evidence of intracranial hemorrhage.  No extra-axial fluid collection or mass.  No intracranial mass effect.  No hydrocephalus.  Midline  structures have a normal configuration. SINUSES: Mild mucosal thickening of the anterior ethmoid sinuses.  Mastoid air cells are clear. ORBITS: Visualized orbits are normal. Other: Redemonstration of a 4.6 cm x 2.4 cm hematoma within the left parietooccipital scalp with surrounding soft tissue swelling.     No acute intracranial pathology. Moderate chronic small-vessel ischemic changes of the supratentorial white matter. 4.6 cm x 2.4 cm hematoma within the left parietooccipital scalp with surrounding soft tissue swelling. I concur with the preliminary report Electronically signed by: Diogo Duffy Date:    03/05/2025 Time:    07:46    Echo  Addendum Date: 3/4/2025    Left Ventricle: The left ventricle is normal in size. Mildly increased wall thickness. Septal thickening. There is normal systolic function with a visually estimated ejection fraction of 60 - 65%. Grade I diastolic dysfunction.   Right Ventricle: The right ventricle is normal in size. Systolic function is normal. TAPSE is 2.46 cm.   Left Atrium: Agitated saline study of the atrial septum is negative, suggesting absence of intracardiac shunt at the atrial level.   There are no significant valvular abnormalities.   There is no pericardial effusion.     Result Date: 3/4/2025    Left Ventricle: The left ventricle is normal in size. Mildly increased wall thickness. Septal thickening. There is normal systolic function with a visually estimated ejection fraction of 55 - 60%. Grade I diastolic dysfunction.   Right Ventricle: The right ventricle is normal in size. Systolic function is normal. TAPSE is 2.46 cm.   There are no significant valvular abnormalities.   There is no pericardial effusion.    CTA Head and Neck (xpd)  Result Date: 3/4/2025  EXAMINATION: CTA HEAD AND NECK (XPD) TECHNIQUE: Non contrast low dose axial images were obtained through the head.  CT angiogram was performed from the level of the katherine to the top of the head following the IV  administration 100 cc of Isovue 370 contrast .  Sagittal and coronal reconstructions and maximum intensity projection reconstructions were performed. Arterial stenosis percentages are based on NASCET measurement criteria.  Additional multiplanar reconstructions were performed on post processed imaging.  Automatic exposure control (AEC) is utilized to reduce patient radiation exposure. COMPARISON: None FINDINGS: Source images: No intracranial mass lesion is seen.  No hemorrhage is seen.  No infarct is seen.  Some cerebral atrophy seen consistent with patient's age.  Some compensatory ventricular dilatation and periventricular white matter changes seen consistent patient's age.  There is a soft tissue cephalhematoma in the posterior parietal region on the left side..  No cervical mass or lesion is seen.  No abnormal lymphadenopathy seen.  Thyroid appears normal.  Larynx appears normal.  Trachea is midline.  Thoracic inlet appears normal. Vascular images: The aortic arch is normal in caliber.  Some atherosclerotic plaque is seen.  There is a 2 vessel arch seen which is a normal variant.  The common carotid arteries are widely patent.  There is calcified plaque seen in the right carotid bulb and proximal internal carotid artery but no hemodynamically significant stenosis is seen.  There is some calcified plaque in the left carotid bulb and proximal internal carotid artery but no hemodynamically significant stenosis is seen. Both internal carotid arteries are patent distally and seen to level the clinoid and supraclinoid region.  Some calcified plaque is seen at the cavernous and clinoid portions of the internal carotid arteries with areas of stenosis measuring up to 40%. The MCAs are patent.  The M1 segments, M2 segments M3 segments are patent. The A1 segments are patent.  Anterior cerebral arteries are widely patent.  No aneurysm or obstruction is seen. Both vertebral arteries are patent.  They are normal in caliber.   No dissection is seen.  No fibromuscular dysplasia is seen.  Both vertebral arteries perfuse a normal appearing basilar artery.  Posterior cerebral arteries are widely patent with no aneurysm or obstruction is seen. .     No evidence of large vessel occlusion seen Some areas of atherosclerotic plaque as outlined above but no high-grade stenosis seen Soft tissue cephalohematoma in the scalp in the left posterior parietal region. Electronically signed by: Sae Pina Date:    03/04/2025 Time:    12:15    CT Head Without Contrast  Result Date: 3/4/2025  EXAMINATION: CT HEAD WITHOUT CONTRAST CLINICAL HISTORY: Trauma;  found down.  Occipital hematoma.  Left arm skin tear.  Patient nonverbal at this time.  Last seen normal yesterday. TECHNIQUE: Low dose axial CT images obtained throughout the head without intravenous contrast.  Axial, sagittal and coronal reconstructions were performed and interpreted. DLP: 1162 mGycm All CT scans at this location are performed using dose optimization techniques as appropriate to a performed exam including the following automated exposure control, adjustment of the mA and/or kV according to patient size and/or use of iterative reconstruction technique COMPARISON: No relevant prior available for comparison. FINDINGS: BRAIN: Gray white differentiation is maintained. Moderate periventricular and subcortical white matter changes most compatible with chronic small vessel ischemic disease.  No edema. No mass effect or midline shift.  There is cerebral atrophy.  The posterior fossa and midline structures are unremarkable. VENTRICLES: Ex vacuo dilatation of the ventricles. EXTRA-AXIAL: Mild hyperdensity at the falx.  Unable to differentiate trace subdural hemorrhage versus calcifications along the falx. BONES: Calvarium is intact. SINUSES AND MASTOIDS: Visualized paranasal sinuses and mastoid air cells are clear. OTHER: Left occipital scalp hematoma.     1. Mild hyperdensity along the falx.   Differential considerations include trace subdural hemorrhage or calcifications.  In the absence of outside prior for comparison, suggest short interval follow-up head CT to reassess. 2. Chronic cerebral atrophy and small-vessel ischemic change 3. Left occipital scalp hematoma. Electronically signed by: Italia Tang Date:    03/04/2025 Time:    12:15    CT Cervical Spine Without Contrast  Result Date: 3/4/2025  EXAMINATION: CT CERVICAL SPINE WITHOUT CONTRAST CLINICAL HISTORY: Trauma; TECHNIQUE: Low dose axial images, sagittal and coronal reformations were performed though the cervical spine.  Contrast was not administered. Automatic exposure control is utilized to reduce patient radiation exposure. COMPARISON: None FINDINGS: The vertebral body heights are well maintained.  The alignment is well maintained.  The bones are  osteoporotic.  There are degenerative changes seen throughout the cervical spine.  No evidence of acute fracture is seen.  No dislocation is seen.  Odontoid lateral masses appear grossly unremarkable.  No soft tissue abnormality is seen.  No retropharyngeal abnormality is seen.  Visualized portions of the airway appear grossly unremarkable.  Visualized portion of the thoracic inlet appears unremarkable     Degenerative changes in the cervical spine otherwise unremarkable Electronically signed by: Sae Pina Date:    03/04/2025 Time:    12:07    X-Ray Chest 1 View  Result Date: 3/4/2025  EXAMINATION: XR CHEST 1 VIEW CLINICAL HISTORY: r/o bleeding or hemorrhage; TECHNIQUE: Single frontal view of the chest was performed. COMPARISON: None available FINDINGS: There are chronic appearing lung changes seen bilaterally. No evidence of acute infiltrate is seen. No mass is seen. No lesion is seen. No pleural effusion is seen. The heart appears normal. The aorta is ectatic.  The bones and joints show no acute abnormality.     No acute process Electronically signed by: Sae Pina Date:    03/04/2025  Time:    11:58    ED US Fast  Result Date: 3/4/2025  Bethel Rosa MD     3/4/2025  1:35 PM ED US Fast Date/Time: 3/4/2025 11:23 AM Performed by: Bethel Rosa MD Authorized by: Jamel Chandra Jr., MD  Indication:  Blunt trauma Identified Structures:  The pericardium, hepatorenal space, splenorenal space, and pelvic cul-de-sac were examined The following findings in the peritoneal, pericardial, and pleural spaces were obtained:   Pericardial effusion:  Absent   Hepatorenal free fluid:  Absent   Splenorenal free fluid:  Absent   Suprapubic/Pouch of Liban free fluid:  Absent   Impression:  No pathologic free fluid   Charge?:  Yes        Assessment & Plan:    Hyponatremia  SIADH  Hypokalemia  Acute metabolic encephalopathy   Recurrent falls  Subdural hemorrhage  Lupus on mycophenolate and prednisone  Metabolic acidosis    Recommendations:    - urine studies and serum osmolality along with patient worsened hyponatremia yesterday evening with fluid challenge, suggest SIADH.  She was started on sodium tabs yesterday evening, along with sodium bicarbonate tablets.  Also instituted 1500 cc per day fluid restriction.  We will continue sodium tabs   Strict intake and output.      Thank you for allowing us to participate in this patient's care.       Attending note  Patient seen and examined independently  Agree with above assessment and findings  74-year-old female with history of hypothyroidism, lupus.  She was brought on 03/04/2025 after a fall, was admitted with subdural hematoma on CT.  Her sodium was 132 on admission, dropped to 124 yesterday after IV fluid resuscitation.  We are consulted with hyponatremia   With his sodium drop from isotonic fluids use and with high urine sodium and osmolality, we ordered salt tablets to be started last night  Has fatigue, poor appetite   Vitals signs and nursing note reviewed.   Temp:  [97.6 °F (36.4 °C)-98.2 °F (36.8 °C)]   Pulse:  [91-94]   Resp:  [17-18]   BP:  (100-122)/(60-66)   SpO2:  [94 %-98 %]      General: NAD, sick looking  HEENT: NC/AT. MM moist  Neck: No JVD, no carotid bruit  Resp: ROSALIA present. No w/r/c  Cardiac: S1S2 heard, no m/r/g  Abd: Soft, NT, BS present, no organomegaly appreciated, nondistended  Ext: No edema, clubbing, color change.  Neuro: no focal deficits, sensory deficits, AAOx3  Skin: no rash, lesions. dry    Reviewed available labs and imaging  Hyponatremia   Hypokalemia   Metabolic acidosis   Subdural hemorrhage  Lupus     With sodium drop in the setting of isotonic fluids use and with subdural hemorrhage- concerning for SIADH   Keep salt tablets twice a day with sodium slowly improving since starting salt tablets  Start p.o. sodium bicarbonate   Replace potassium   Start free water restriction 1500 mL  TSH level was normal recently   Check cortisol level in the morning      Components of this note were documented using voice recognition systems; and are subject to errors not corrected at proof reading.  Please contact the author for any clarifications.           [1]   Current Facility-Administered Medications   Medication Dose Route Frequency Provider Last Rate Last Admin    acetaminophen tablet 1,000 mg  1,000 mg Oral Q6H PRN Jenifer Petty FNP   1,000 mg at 03/05/25 2341    bisacodyL suppository 10 mg  10 mg Rectal Daily PRN Josefina Sampson AGACNP-BC        haloperidol lactate injection 5 mg  5 mg Intravenous Q6H PRN Francis Valentin MD   5 mg at 03/04/25 2015    labetaloL injection 10 mg  10 mg Intravenous Q6H PRN Josefina Sampson AGACNP-BC        levothyroxine tablet 75 mcg  75 mcg Oral Before breakfast Venkat Kaplan MD   75 mcg at 03/09/25 0534    melatonin tablet 9 mg  9 mg Oral Nightly PRN Jenifer Petty FNP   9 mg at 03/08/25 2003    mycophenolate capsule 500 mg  500 mg Oral BID Venkat Kaplan MD   500 mg at 03/09/25 0841    ondansetron injection 4 mg  4 mg Intravenous Q8H PRN Josefina Sampson AGACNP-BC         predniSONE tablet 5 mg  5 mg Oral Daily Venkat Kaplan MD   5 mg at 03/09/25 0841    sodium bicarbonate tablet 650 mg  650 mg Oral BID Manolo Butt MD   650 mg at 03/09/25 0841    sodium chloride oral tablet 1,000 mg  1,000 mg Oral BID Manolo Butt MD   1,000 mg at 03/09/25 0841   [2]   Medications Prior to Admission   Medication Sig Dispense Refill Last Dose/Taking    amLODIPine (NORVASC) 10 MG tablet Take 10 mg by mouth once daily.   Past Week    estrogens, conjugated, (PREMARIN) 0.3 MG tablet Take 0.3 mg by mouth once daily.   Past Week    levothyroxine (SYNTHROID) 50 MCG tablet Take 50 mcg by mouth before breakfast. Take 1 tablet every other day alternate with 75mcg dose   Past Week    levothyroxine (SYNTHROID) 75 MCG tablet Take 75 mcg by mouth before breakfast.   Past Week    mycophenolate (CELLCEPT) 500 mg Tab Take 500 mg by mouth 2 (two) times daily.   Past Week    predniSONE (DELTASONE) 5 MG tablet Take 5 mg by mouth once daily.   Past Week    trospium (SANCTURA) 20 mg Tab tablet Take 20 mg by mouth 2 (two) times daily.   Past Week

## 2025-03-10 LAB
ALBUMIN SERPL-MCNC: 3.3 G/DL (ref 3.4–4.8)
ALBUMIN/GLOB SERPL: 1.1 RATIO (ref 1.1–2)
ALP SERPL-CCNC: 54 UNIT/L (ref 40–150)
ALT SERPL-CCNC: 21 UNIT/L (ref 0–55)
ANION GAP SERPL CALC-SCNC: 12 MEQ/L
AST SERPL-CCNC: 25 UNIT/L (ref 5–34)
BASOPHILS # BLD AUTO: 0.01 X10(3)/MCL
BASOPHILS NFR BLD AUTO: 0.1 %
BILIRUB SERPL-MCNC: 0.7 MG/DL
BUN SERPL-MCNC: 8.7 MG/DL (ref 9.8–20.1)
CALCIUM SERPL-MCNC: 8.2 MG/DL (ref 8.4–10.2)
CHLORIDE SERPL-SCNC: 97 MMOL/L (ref 98–107)
CO2 SERPL-SCNC: 23 MMOL/L (ref 23–31)
CORTIS SERPL-SCNC: 15.6 UG/DL
CREAT SERPL-MCNC: 0.68 MG/DL (ref 0.55–1.02)
CREAT/UREA NIT SERPL: 13
EOSINOPHIL # BLD AUTO: 0 X10(3)/MCL (ref 0–0.9)
EOSINOPHIL NFR BLD AUTO: 0 %
ERYTHROCYTE [DISTWIDTH] IN BLOOD BY AUTOMATED COUNT: 13.6 % (ref 11.5–17)
GFR SERPLBLD CREATININE-BSD FMLA CKD-EPI: >60 ML/MIN/1.73/M2
GLOBULIN SER-MCNC: 3 GM/DL (ref 2.4–3.5)
GLUCOSE SERPL-MCNC: 97 MG/DL (ref 82–115)
HCT VFR BLD AUTO: 34.8 % (ref 37–47)
HGB BLD-MCNC: 12.6 G/DL (ref 12–16)
IMM GRANULOCYTES # BLD AUTO: 0.02 X10(3)/MCL (ref 0–0.04)
IMM GRANULOCYTES NFR BLD AUTO: 0.3 %
LYMPHOCYTES # BLD AUTO: 0.93 X10(3)/MCL (ref 0.6–4.6)
LYMPHOCYTES NFR BLD AUTO: 12.7 %
MAGNESIUM SERPL-MCNC: 2.4 MG/DL (ref 1.6–2.6)
MCH RBC QN AUTO: 31.8 PG (ref 27–31)
MCHC RBC AUTO-ENTMCNC: 36.2 G/DL (ref 33–36)
MCV RBC AUTO: 87.9 FL (ref 80–94)
MONOCYTES # BLD AUTO: 0.78 X10(3)/MCL (ref 0.1–1.3)
MONOCYTES NFR BLD AUTO: 10.6 %
NEUTROPHILS # BLD AUTO: 5.61 X10(3)/MCL (ref 2.1–9.2)
NEUTROPHILS NFR BLD AUTO: 76.3 %
NRBC BLD AUTO-RTO: 0 %
PHOSPHATE SERPL-MCNC: 2.5 MG/DL (ref 2.3–4.7)
PLATELET # BLD AUTO: 122 X10(3)/MCL (ref 130–400)
PMV BLD AUTO: 12.4 FL (ref 7.4–10.4)
POTASSIUM SERPL-SCNC: 3.4 MMOL/L (ref 3.5–5.1)
PROT SERPL-MCNC: 6.3 GM/DL (ref 5.8–7.6)
RBC # BLD AUTO: 3.96 X10(6)/MCL (ref 4.2–5.4)
SODIUM SERPL-SCNC: 132 MMOL/L (ref 136–145)
WBC # BLD AUTO: 7.35 X10(3)/MCL (ref 4.5–11.5)

## 2025-03-10 PROCEDURE — 83735 ASSAY OF MAGNESIUM: CPT | Performed by: NURSE PRACTITIONER

## 2025-03-10 PROCEDURE — 36415 COLL VENOUS BLD VENIPUNCTURE: CPT | Performed by: NURSE PRACTITIONER

## 2025-03-10 PROCEDURE — 97129 THER IVNTJ 1ST 15 MIN: CPT

## 2025-03-10 PROCEDURE — 25000003 PHARM REV CODE 250: Performed by: INTERNAL MEDICINE

## 2025-03-10 PROCEDURE — 25000003 PHARM REV CODE 250: Performed by: NURSE PRACTITIONER

## 2025-03-10 PROCEDURE — 85025 COMPLETE CBC W/AUTO DIFF WBC: CPT | Performed by: NURSE PRACTITIONER

## 2025-03-10 PROCEDURE — 84100 ASSAY OF PHOSPHORUS: CPT | Performed by: NURSE PRACTITIONER

## 2025-03-10 PROCEDURE — 82533 TOTAL CORTISOL: CPT | Performed by: INTERNAL MEDICINE

## 2025-03-10 PROCEDURE — 25000003 PHARM REV CODE 250: Performed by: HOSPITALIST

## 2025-03-10 PROCEDURE — 80053 COMPREHEN METABOLIC PANEL: CPT | Performed by: NURSE PRACTITIONER

## 2025-03-10 PROCEDURE — 21400001 HC TELEMETRY ROOM

## 2025-03-10 PROCEDURE — 63600175 PHARM REV CODE 636 W HCPCS: Performed by: HOSPITALIST

## 2025-03-10 PROCEDURE — 36415 COLL VENOUS BLD VENIPUNCTURE: CPT | Performed by: INTERNAL MEDICINE

## 2025-03-10 PROCEDURE — 87040 BLOOD CULTURE FOR BACTERIA: CPT | Performed by: INTERNAL MEDICINE

## 2025-03-10 RX ORDER — AMLODIPINE BESYLATE 5 MG/1
5 TABLET ORAL DAILY
Status: DISCONTINUED | OUTPATIENT
Start: 2025-03-10 | End: 2025-03-11

## 2025-03-10 RX ADMIN — POTASSIUM BICARBONATE 50 MEQ: 978 TABLET, EFFERVESCENT ORAL at 08:03

## 2025-03-10 RX ADMIN — SODIUM CHLORIDE 1000 MG: 1 TABLET ORAL at 08:03

## 2025-03-10 RX ADMIN — AMLODIPINE BESYLATE 5 MG: 5 TABLET ORAL at 08:03

## 2025-03-10 RX ADMIN — Medication 9 MG: at 08:03

## 2025-03-10 RX ADMIN — MYCOPHENOLATE MOFETIL 500 MG: 250 CAPSULE ORAL at 08:03

## 2025-03-10 RX ADMIN — LEVOTHYROXINE SODIUM 75 MCG: 0.05 TABLET ORAL at 05:03

## 2025-03-10 RX ADMIN — PREDNISONE 5 MG: 5 TABLET ORAL at 08:03

## 2025-03-10 NOTE — PROGRESS NOTES
Nephrology consult follow up note    HPI:      Ana Maria Callejas is a 74 y.o. female presenting to the ED BM in initially on 03/04/2025 following a fall.  No history of chronic medical illness and found in patient poor historian.  On admit CT of the head revealed possible trace subdural hematoma.  CTA was unrevealing for any stenosis, LV or aneurysm.  On admit labs she had some hyponatremia, which has worsened in the past couple of days.  We are consulted for management of hyponatremia.  Admit the UA relatively unremarkable.  Urine sodium was 40, urine osmolality 619, serum osmolality low at 261.  On a.m. labs sodium was stable at 127, potassium low at 3.4 serum CO2 is okay at 22.  Renal indices normal with BUN 6 and creatinine 0.64.  He is awake alert and oriented x3 lying in bed.  She denies any shortness of breath, chest pain, nausea or vomiting.  Appetite is fair.    Interval history:     3/10/25  No acute events overnight.  No new complaints.  No chest pain, shortness of breath, nausea, vomiting, or lower extremity edema.  Patient alert today.     Review of Systems:     Comprehensive 10pt ROS negative except as noted per history.    Past medical, family, surgical, and social history reviewed and unchanged from initial consult note.     Objective:       VITAL SIGNS: 24 HR MIN & MAX LAST    Temp  Min: 97.3 °F (36.3 °C)  Max: 100.5 °F (38.1 °C)  98.4 °F (36.9 °C)        BP  Min: 116/69  Max: 157/85  (!) 146/80     Pulse  Min: 81  Max: 100  81     Resp  Min: 17  Max: 18  18    SpO2  Min: 92 %  Max: 97 %  95 %      GEN:  Elderly WF in NAD  CV: RRR +S1,S2 without murmur  PULM: CTAB, unlabored  ABD: Soft, NT/ND abdomen with NABS  EXT: No cyanosis or edema  SKIN: Warm and dry  PSYCH: Awake, alert and appropriately conversant.   Dialysis access:  No dialysis access            Component Value Date/Time     (L) 03/10/2025 0535     (L) 03/09/2025 0457    K 3.4 (L) 03/10/2025 0535    K 3.4 (L) 03/09/2025 0457    CO2  23 03/10/2025 0535    CO2 22 (L) 03/09/2025 0457    BUN 8.7 (L) 03/10/2025 0535    BUN 6.1 (L) 03/09/2025 0457    CREATININE 0.68 03/10/2025 0535    CREATININE 0.64 03/09/2025 0457    CALCIUM 8.2 (L) 03/10/2025 0535    CALCIUM 8.0 (L) 03/09/2025 0457    PHOS 2.5 03/10/2025 0535            Component Value Date/Time    WBC 7.35 03/10/2025 0535    WBC 3.48 (L) 03/09/2025 0457    HGB 12.6 03/10/2025 0535    HGB 12.2 03/09/2025 0457    HCT 34.8 (L) 03/10/2025 0535    HCT 34.3 (L) 03/09/2025 0457     (L) 03/10/2025 0535     (L) 03/09/2025 0457         Imaging reviewed      Assessment / Plan:       Active Hospital Problems    Diagnosis  POA    *AMS (altered mental status) [R41.82]  Yes    Repeated falls [R29.6]  Yes      Resolved Hospital Problems   No resolved problems to display.       Hyponatremia  SIADH  Hypokalemia  Acute metabolic encephalopathy   Recurrent falls  Subdural hemorrhage  Lupus on mycophenolate and prednisone  Metabolic acidosis     Plan:  Renal function remains normalized.  Hyponatremia improving.  Continue treatment for SIADH with salt tablets 1 g b.i.d., and 1.5 L fluid restriction.  Patient is wanting to take her home trospium chloride for overactive bladder, but I can not order it in the hospital.  We will sign off at this time.      Jose Ordoñez DO  Nephrology  [facility]  Clinic number: [phone]      Note was created with the assistance of electronic Dictation Services.  Note was reviewed to decrease errors, however, these may still be present.  Please contact me about questions or concerns with the dictation.

## 2025-03-10 NOTE — PROGRESS NOTES
[facility] Hospital Medicine Progress Note        Chief Complaint: Inpatient Follow-up    HPI:   74-year-old female with significant history of HTN, hypothyroidism, lupus on prednisone, CellCept was brought to the ED by family with complaints of recurrent falls at home with associated loss of consciousness, falls has been happening since last November.  Patient is lives alone and at baseline was independent of all activities of daily living.  Chest x-ray negative in the ED, hemodynamics stable, CT head was concerning for trace SDH, cerebral atrophy, small-vessel ischemic change, scalp hematoma, CT C-spine with no traumatic injuries, CT angiogram head and neck with no large vessel occlusion.  MRI brain was ordered to further evaluate recurrent falls with loss of consciousness which was negative for any acute pathology, noted left parieto-occipital scalp hematoma.  Echocardiogram with diastolic dysfunction, intact EF.  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  EEG with diffuse slowing, no epileptiform discharges.  Therapy Services evaluated and recommended skilled nursing facility placement, case management consulted for the same    Interval Hx:   Patient seen at bedside, family is at bedside, hemodynamics are stable, history is limited since the patient has hearing impairment, she is seems fully oriented, patient had 1 episode of high-grade temp spike overnight, otherwise no specific complaints    Objective/physical exam:  General: In no acute distress, afebrile  Chest: Clear to auscultation bilaterally  Heart: S1, S2, no appreciable murmur  Abdomen: Soft, nontender, BS +  MSK: Warm, no lower extremity edema, no clubbing or cyanosis  Neurologic: Alert and oriented x4, moving all extremities with good strength     VITAL SIGNS: 24 HRS MIN & MAX LAST   Temp  Min: 97.3 °F (36.3 °C)   Max: 100.5 °F (38.1 °C) 97.3 °F (36.3 °C)   BP  Min: 116/69  Max: 157/85 116/69   Pulse  Min: 93  Max: 100  96   Resp  Min: 17  Max: 18 17   SpO2  Min: 92 %  Max: 97 % 97 %       Recent Labs   Lab 03/10/25  0535   WBC 7.35   RBC 3.96*   HGB 12.6   HCT 34.8*   MCV 87.9   MCH 31.8*   MCHC 36.2*   RDW 13.6   *   MPV 12.4*         Recent Labs   Lab 03/10/25  0535   *   K 3.4*   CL 97*   CO2 23   BUN 8.7*   CREATININE 0.68   CALCIUM 8.2*   MG 2.40   ALBUMIN 3.3*   ALKPHOS 54   ALT 21   AST 25   BILITOT 0.7          Microbiology Results (last 7 days)       Procedure Component Value Units Date/Time    Blood Culture [[id number]]     Order Status: Sent Specimen: Blood     Blood Culture [[id number]]     Order Status: Sent Specimen: Blood     Blood Culture [[id number]]  (Normal) Collected: 03/04/25 1604    Order Status: Completed Specimen: Blood Updated: 03/09/25 1902     Blood Culture No Growth at 5 days    Blood Culture [[id number]]  (Normal) Collected: 03/04/25 1604    Order Status: Completed Specimen: Blood Updated: 03/09/25 1902     Blood Culture No Growth at 5 days             Scheduled Med:   levothyroxine  75 mcg Oral Before breakfast    mycophenolate  500 mg Oral BID    potassium bicarbonate  50 mEq Oral Once    predniSONE  5 mg Oral Daily    sodium chloride  1,000 mg Oral BID          Assessment/Plan:    Fever spike x1 episode  Recurrent falls/syncope with collapse  Severe physical deconditioning   Acute on chronic hyponatremia secondary to SIADH  Mild hypokalemia  Traumatic scalp hematoma  Immunosuppressed state   History of lupus on prednisone/CellCept  History of hypothyroidism   History of essential HTN   History of bronchial asthma with no acute exacerbation   Prophylaxis    Chest x-ray, UA ordered to further evaluate temp spike, no leukocytosis  No large vessel occlusion   Stroke ruled out   No SDH   Echo with intact EF, negative bubble study   Falls likely secondary to severe physical  deconditioning   Continue therapy services   Sodium improving on fluid restriction-1.5 L salt tablets   Nephrology signed off   Resume home amlodipine at 5 mg daily since blood pressure slightly accelerated   Continue home dose levothyroxine   TSH is within normal limits   Continue prednisone/CellCept   DVT prophylaxis-add subQ Lovenox    Case management working on skilled nursing facility placement        Josefina Narayan MD   03/10/2025     Update   One-view chest x-ray concerning for right-sided pneumonia, obtain two-view to confirm

## 2025-03-10 NOTE — PT/OT/SLP PROGRESS
Physical Therapy      Patient Name:  Ana Maria Callejas   MRN:  [id number]    Patient not seen today secondary to patient being seen by SLP. Will follow-up as schedule allows.

## 2025-03-10 NOTE — PLAN OF CARE
Problem: Adult Inpatient Plan of Care  Goal: Plan of Care Review  3/10/2025 1156 by Madison Johnson RN  Outcome: Progressing  Flowsheets (Taken 3/10/2025 1156)  Plan of Care Reviewed With: patient  3/10/2025 1156 by Madison Johnson RN  Outcome: Progressing  Goal: Patient-Specific Goal (Individualized)  3/10/2025 1156 by Madison Johnson RN  Outcome: Progressing  Flowsheets (Taken 3/10/2025 1156)  Individualized Care Needs: to go home  Anxieties, Fears or Concerns: to go home  Patient/Family-Specific Goals (Include Timeframe): to go home  3/10/2025 1156 by Madison Johnson RN  Outcome: Progressing  Goal: Absence of Hospital-Acquired Illness or Injury  3/10/2025 1156 by Madison Johnson RN  Outcome: Progressing  3/10/2025 1156 by Madison Johnson RN  Outcome: Progressing  Intervention: Identify and Manage Fall Risk  Flowsheets (Taken 3/10/2025 1156)  Safety Promotion/Fall Prevention:   assistive device/personal item within reach   nonskid shoes/socks when out of bed   side rails raised x 2  Goal: Optimal Comfort and Wellbeing  3/10/2025 1156 by Madison Johnson RN  Outcome: Progressing  3/10/2025 1156 by Madison Johnson RN  Outcome: Progressing  Goal: Readiness for Transition of Care  3/10/2025 1156 by Madison Johnson RN  Outcome: Progressing  3/10/2025 1156 by Madison Johnson RN  Outcome: Progressing     Problem: Stroke, Ischemic (Includes Transient Ischemic Attack)  Goal: Optimal Coping  3/10/2025 1156 by Madison Johnson RN  Outcome: Progressing  3/10/2025 1156 by Madiosn Johnson RN  Outcome: Progressing  Goal: Effective Bowel Elimination  3/10/2025 1156 by Madison Johnson RN  Outcome: Progressing  3/10/2025 1156 by Madison Johnson RN  Outcome: Progressing  Goal: Optimal Cerebral Tissue Perfusion  3/10/2025 1156 by Madison Johnson RN  Outcome: Progressing  3/10/2025 1156 by Madison Johnson RN  Outcome: Progressing  Goal: Optimal Cognitive Function  3/10/2025 1156 by Madison Johnson RN  Outcome: Progressing  3/10/2025 1156 by Alex  ERICK Wallace  Outcome: Progressing  Goal: Improved Communication Skills  3/10/2025 1156 by Madison Johnson RN  Outcome: Progressing  3/10/2025 1156 by Madison Johnson RN  Outcome: Progressing  Goal: Optimal Functional Ability  3/10/2025 1156 by Madison Johnson RN  Outcome: Progressing  3/10/2025 1156 by Madison Johnson RN  Outcome: Progressing  Goal: Optimal Nutrition Intake  3/10/2025 1156 by Madison Johnson RN  Outcome: Progressing  3/10/2025 1156 by Madison Johnson RN  Outcome: Progressing  Goal: Effective Oxygenation and Ventilation  3/10/2025 1156 by Madison Johnson RN  Outcome: Progressing  3/10/2025 1156 by Madison Johnson RN  Outcome: Progressing  Goal: Improved Sensorimotor Function  3/10/2025 1156 by Madison Johnson RN  Outcome: Progressing  3/10/2025 1156 by Madison Johnson RN  Outcome: Progressing  Goal: Safe and Effective Swallow  3/10/2025 1156 by Madison Johnson RN  Outcome: Progressing  3/10/2025 1156 by Madison Johnson RN  Outcome: Progressing  Goal: Effective Urinary Elimination  3/10/2025 1156 by Madison Johnson RN  Outcome: Progressing  3/10/2025 1156 by Madison Johnson RN  Outcome: Progressing     Problem: Infection  Goal: Absence of Infection Signs and Symptoms  3/10/2025 1156 by Madison Johnson RN  Outcome: Progressing  3/10/2025 1156 by Madison Johnson RN  Outcome: Progressing

## 2025-03-10 NOTE — PLAN OF CARE
03/10/25 1216   Discharge Reassessment   Assessment Type Discharge Planning Reassessment   Discharge Plan discussed with: Patient   Discharge Plan A Rehab   Discharge Plan B Rehab   Post-Acute Status   Post-Acute Authorization Placement   Post-Acute Placement Status Referrals Sent  (Verbal FOC LPR)

## 2025-03-10 NOTE — PT/OT/SLP PROGRESS
[facility]  Speech Language Pathology Department  Therapy Progress Note    Patient Name:  Ana Maria Callejas   MRN:  [id number]  Admitting Diagnosis: AMS    Recommendations     General recommendations:  cognitive-communication therapy  Communication strategies:  hearing aids    Discharge therapy intensity: Moderate Intensity Therapy   Barriers to safe discharge: safety awareness    Subjective     Patient awake, alert, and cooperative.    Pain/Comfort:  0/10  Spiritual/Cultural/Alevism Beliefs/Practices that affect care: no  Respiratory Status: room air      Objective     Therapeutic Activities  Naming tasks:   Convergent- 80%  Divergent- 100%  Problem solving- 100%  Sustained attention- 3 minutes  Assessment     Pt continues to present with cognitive linguistic impairment which negatively impact safe independent functioning.     Goals     Multidisciplinary Problems       SLP Goals          Problem: SLP    Goal Priority Disciplines Outcome   SLP Goal     SLP Progressing   Description: LTG: The pt will increase cognitive linguistic skills to increase safety awareness and independence to promote return to PLOF.     STGs:  The pt will answer complex yes/no questions with 100% accuracy and occasional cues.   The pt will be oriented x4 with occasional cues.   The pt will maintain attention to task for 5 minutes with no redirection to task.  The pt will recall 90% of targets with minimal cues following 10 minute filled delay.  The pt will complete convergent/divergent thinking tasks with 80% accuracy and minimal cues.   The pt will complete problem solving tasks with 80% accuracy and minimal cues.   The pt will complete home management tasks with 80% accuracy and minimal cues.                        Patient Education     Patient provided with verbal education regarding SLP POC.  Understanding was verbalized, however additional teaching warranted.    Plan     Will continue to follow and tx as  appropriate.    SLP Follow-Up:  Yes   Patient to be seen:  5 x/week   Plan of Care expires:  03/21/25  Plan of Care reviewed with:  patient     Time Tracking     SLP Treatment Date:   03/10/25  Speech Start Time:  1003  Speech Stop Time:  1020     Speech Total Time (min):  17 min    Billable minutes:  Cognitive Skills Intervention, 17 minutes     03/10/2025

## 2025-03-11 LAB
ALBUMIN SERPL-MCNC: 3.3 G/DL (ref 3.4–4.8)
ALBUMIN/GLOB SERPL: 1.3 RATIO (ref 1.1–2)
ALP SERPL-CCNC: 52 UNIT/L (ref 40–150)
ALT SERPL-CCNC: 20 UNIT/L (ref 0–55)
ANION GAP SERPL CALC-SCNC: 13 MEQ/L
AST SERPL-CCNC: 23 UNIT/L (ref 5–34)
BASOPHILS # BLD AUTO: 0.03 X10(3)/MCL
BASOPHILS NFR BLD AUTO: 0.5 %
BILIRUB SERPL-MCNC: 0.7 MG/DL
BUN SERPL-MCNC: 11.5 MG/DL (ref 9.8–20.1)
CALCIUM SERPL-MCNC: 8.3 MG/DL (ref 8.4–10.2)
CHLORIDE SERPL-SCNC: 100 MMOL/L (ref 98–107)
CO2 SERPL-SCNC: 22 MMOL/L (ref 23–31)
CREAT SERPL-MCNC: 0.69 MG/DL (ref 0.55–1.02)
CREAT/UREA NIT SERPL: 17
EOSINOPHIL # BLD AUTO: 0.01 X10(3)/MCL (ref 0–0.9)
EOSINOPHIL NFR BLD AUTO: 0.2 %
ERYTHROCYTE [DISTWIDTH] IN BLOOD BY AUTOMATED COUNT: 13.8 % (ref 11.5–17)
GFR SERPLBLD CREATININE-BSD FMLA CKD-EPI: >60 ML/MIN/1.73/M2
GLOBULIN SER-MCNC: 2.6 GM/DL (ref 2.4–3.5)
GLUCOSE SERPL-MCNC: 93 MG/DL (ref 82–115)
HCT VFR BLD AUTO: 35.4 % (ref 37–47)
HGB BLD-MCNC: 12.6 G/DL (ref 12–16)
IMM GRANULOCYTES # BLD AUTO: 0.03 X10(3)/MCL (ref 0–0.04)
IMM GRANULOCYTES NFR BLD AUTO: 0.5 %
LYMPHOCYTES # BLD AUTO: 1.28 X10(3)/MCL (ref 0.6–4.6)
LYMPHOCYTES NFR BLD AUTO: 19.2 %
MAGNESIUM SERPL-MCNC: 2.4 MG/DL (ref 1.6–2.6)
MCH RBC QN AUTO: 31.3 PG (ref 27–31)
MCHC RBC AUTO-ENTMCNC: 35.6 G/DL (ref 33–36)
MCV RBC AUTO: 88.1 FL (ref 80–94)
MONOCYTES # BLD AUTO: 0.74 X10(3)/MCL (ref 0.1–1.3)
MONOCYTES NFR BLD AUTO: 11.1 %
MRSA PCR SCRN (OHS): NOT DETECTED
NEUTROPHILS # BLD AUTO: 4.56 X10(3)/MCL (ref 2.1–9.2)
NEUTROPHILS NFR BLD AUTO: 68.5 %
NRBC BLD AUTO-RTO: 0 %
OHS QRS DURATION: 142 MS
OHS QTC CALCULATION: 474 MS
PLATELET # BLD AUTO: 144 X10(3)/MCL (ref 130–400)
PLATELETS.RETICULATED NFR BLD AUTO: 13 % (ref 0.9–11.2)
PMV BLD AUTO: 12.1 FL (ref 7.4–10.4)
POTASSIUM SERPL-SCNC: 3.6 MMOL/L (ref 3.5–5.1)
PROT SERPL-MCNC: 5.9 GM/DL (ref 5.8–7.6)
RBC # BLD AUTO: 4.02 X10(6)/MCL (ref 4.2–5.4)
SODIUM SERPL-SCNC: 135 MMOL/L (ref 136–145)
WBC # BLD AUTO: 6.65 X10(3)/MCL (ref 4.5–11.5)

## 2025-03-11 PROCEDURE — 83735 ASSAY OF MAGNESIUM: CPT | Performed by: INTERNAL MEDICINE

## 2025-03-11 PROCEDURE — 97530 THERAPEUTIC ACTIVITIES: CPT | Mod: CO

## 2025-03-11 PROCEDURE — 25000003 PHARM REV CODE 250: Performed by: INTERNAL MEDICINE

## 2025-03-11 PROCEDURE — 25000003 PHARM REV CODE 250: Performed by: HOSPITALIST

## 2025-03-11 PROCEDURE — 93005 ELECTROCARDIOGRAM TRACING: CPT

## 2025-03-11 PROCEDURE — 93010 ELECTROCARDIOGRAM REPORT: CPT | Mod: ,,, | Performed by: INTERNAL MEDICINE

## 2025-03-11 PROCEDURE — 25000242 PHARM REV CODE 250 ALT 637 W/ HCPCS: Performed by: INTERNAL MEDICINE

## 2025-03-11 PROCEDURE — 87641 MR-STAPH DNA AMP PROBE: CPT | Performed by: INTERNAL MEDICINE

## 2025-03-11 PROCEDURE — 63600175 PHARM REV CODE 636 W HCPCS: Performed by: HOSPITALIST

## 2025-03-11 PROCEDURE — 36415 COLL VENOUS BLD VENIPUNCTURE: CPT | Performed by: INTERNAL MEDICINE

## 2025-03-11 PROCEDURE — 21400001 HC TELEMETRY ROOM

## 2025-03-11 PROCEDURE — 97129 THER IVNTJ 1ST 15 MIN: CPT

## 2025-03-11 PROCEDURE — 63600175 PHARM REV CODE 636 W HCPCS: Performed by: INTERNAL MEDICINE

## 2025-03-11 PROCEDURE — 80053 COMPREHEN METABOLIC PANEL: CPT | Performed by: INTERNAL MEDICINE

## 2025-03-11 PROCEDURE — 85025 COMPLETE CBC W/AUTO DIFF WBC: CPT | Performed by: INTERNAL MEDICINE

## 2025-03-11 PROCEDURE — 97530 THERAPEUTIC ACTIVITIES: CPT | Mod: CQ

## 2025-03-11 PROCEDURE — 97535 SELF CARE MNGMENT TRAINING: CPT | Mod: CO

## 2025-03-11 PROCEDURE — 25000003 PHARM REV CODE 250: Performed by: NURSE PRACTITIONER

## 2025-03-11 RX ORDER — TROSPIUM CHLORIDE 20 MG/1
20 TABLET, FILM COATED ORAL 2 TIMES DAILY
Status: DISCONTINUED | OUTPATIENT
Start: 2025-03-11 | End: 2025-03-11

## 2025-03-11 RX ORDER — LEVOFLOXACIN 5 MG/ML
750 INJECTION, SOLUTION INTRAVENOUS ONCE
Status: COMPLETED | OUTPATIENT
Start: 2025-03-11 | End: 2025-03-11

## 2025-03-11 RX ORDER — TROSPIUM CHLORIDE 20 MG/1
20 TABLET, FILM COATED ORAL 2 TIMES DAILY
Status: DISCONTINUED | OUTPATIENT
Start: 2025-03-11 | End: 2025-03-18 | Stop reason: HOSPADM

## 2025-03-11 RX ORDER — FLUDROCORTISONE ACETATE 0.1 MG/1
100 TABLET ORAL DAILY
Status: DISCONTINUED | OUTPATIENT
Start: 2025-03-11 | End: 2025-03-18

## 2025-03-11 RX ADMIN — PREDNISONE 5 MG: 5 TABLET ORAL at 08:03

## 2025-03-11 RX ADMIN — FLUDROCORTISONE ACETATE 100 MCG: 0.1 TABLET ORAL at 05:03

## 2025-03-11 RX ADMIN — SODIUM CHLORIDE 1000 MG: 1 TABLET ORAL at 08:03

## 2025-03-11 RX ADMIN — LEVOTHYROXINE SODIUM 75 MCG: 0.05 TABLET ORAL at 05:03

## 2025-03-11 RX ADMIN — Medication 9 MG: at 08:03

## 2025-03-11 RX ADMIN — SODIUM CHLORIDE, POTASSIUM CHLORIDE, SODIUM LACTATE AND CALCIUM CHLORIDE 500 ML: 600; 310; 30; 20 INJECTION, SOLUTION INTRAVENOUS at 01:03

## 2025-03-11 RX ADMIN — LEVOFLOXACIN 750 MG: 750 INJECTION, SOLUTION INTRAVENOUS at 09:03

## 2025-03-11 RX ADMIN — SODIUM CHLORIDE, POTASSIUM CHLORIDE, SODIUM LACTATE AND CALCIUM CHLORIDE 500 ML: 600; 310; 30; 20 INJECTION, SOLUTION INTRAVENOUS at 09:03

## 2025-03-11 RX ADMIN — TROSPIUM CHLORIDE 20 MG: 20 TABLET ORAL at 08:03

## 2025-03-11 NOTE — PT/OT/SLP PROGRESS
Physical Therapy Treatment    Patient Name:  Ana Maria Callejas   MRN:  [id number]    Recommendations:     Discharge therapy intensity: Moderate Intensity Therapy   Discharge Equipment Recommendations: walker, rolling  Barriers to discharge: Impaired mobility and Ongoing medical needs    Assessment:     Ana Maria Callejas is a 74 y.o. female admitted with a medical diagnosis of acute metabolic encephalopathy, scalp hematoma, mild SDH, milk hyponatremia.  She presents with the following impairments/functional limitations: weakness, impaired endurance, impaired self care skills, impaired functional mobility, gait instability, impaired balance, impaired cognition.    Session limited 2/2 hypotension .    Rehab Prognosis: Good; patient would benefit from acute skilled PT services to address these deficits and reach maximum level of function.    Recent Surgery: * No surgery found *      Plan:     During this hospitalization, patient would benefit from acute PT services 5 x/week to address the identified rehab impairments via gait training, therapeutic activities, therapeutic exercises and progress toward the following goals:    Plan of Care Expires:  25    Subjective     Chief Complaint: none stated  Patient/Family Comments/goals: none stated   Pain/Comfort:         Objective:     Communicated with NSG prior to session.  Patient found up in chair with peripheral IV upon PT entry to room.     General Precautions: Standard, fall  Orthopedic Precautions: N/A  Braces: N/A  Respiratory Status: Room air  Blood Pressure:    Seated legs down: 97/61   Standin/48   Supine: 111/64  Skin Integrity: Visible skin intact      Functional Mobility:  Bed Mobility:  Sit to Supine: SBA  Transfers:     Sit to Stand: contact guard assistance  with rolling walker  Chair to Bed: CGA w/RW via step t/f  Gait: deferred at this time 2/2 low BP.   Balance: SBA for static sitting balance; CGA for static standing balance    Education:  Patient provided  with verbal education education regarding PT role/goals/POC, fall prevention, safety awareness, and discharge/DME recommendations.  Understanding was verbalized.     Patient left supine with all lines intact, call button in reach, and nurse notified    GOALS:   Multidisciplinary Problems       Physical Therapy Goals          Problem: Physical Therapy    Goal Priority Disciplines Outcome Interventions   Physical Therapy Goal     PT, PT/OT Progressing    Description: Goals to be met by: 2025     Patient will increase functional independence with mobility by performin. Supine to sit with Stand-by Assistance  2. Sit to stand transfer with Stand-by Assistance  3. Gait  x 150 feet with Stand-by Assistance using Rolling Walker or LRAD.                          Time Tracking:     PT Received On: 25  PT Start Time: 1337     PT Stop Time: 1354  PT Total Time (min): 17 min     Billable Minutes: Therapeutic Activity 17    Treatment Type: Treatment  PT/PTA: PTA     Number of PTA visits since last PT visit: 3     2025

## 2025-03-11 NOTE — PT/OT/SLP PROGRESS
[facility]  Speech Language Pathology Department  Therapy Progress Note    Patient Name:  Ana Maria Callejas   MRN:  [id number]  Admitting Diagnosis: AMS    Recommendations     General recommendations:  cognitive-communication therapy  Communication strategies:  hearing aids    Discharge therapy intensity: Moderate Intensity Therapy   Barriers to safe discharge: safety awareness    Subjective     Patient awake, alert, and cooperative.    Pain/Comfort:  0/10  Spiritual/Cultural/Tenriism Beliefs/Practices that affect care: no  Respiratory Status: room air    Objective     Therapeutic Activities:  Naming tasks:  Convergent: 100%  Divergent: 100%  Problem solvin%  Complex yes/no questions: 50% min cues  Orientation:  Year: independently  Month: independently  Date: independently  Day of week: independently  Sustained attention: 4 minutes    Assessment     Pt continues to present with cognitive linguistic impairment which negatively impacts safe independent functioning.     Goals     Multidisciplinary Problems       SLP Goals          Problem: SLP    Goal Priority Disciplines Outcome   SLP Goal     SLP Progressing   Description: LTG: The pt will increase cognitive linguistic skills to increase safety awareness and independence to promote return to PLOF.     STGs:  The pt will answer complex yes/no questions with 100% accuracy and occasional cues.   The pt will be oriented x4 with occasional cues.   The pt will maintain attention to task for 5 minutes with no redirection to task.  The pt will recall 90% of targets with minimal cues following 10 minute filled delay.  The pt will complete convergent/divergent thinking tasks with 80% accuracy and minimal cues.   The pt will complete problem solving tasks with 80% accuracy and minimal cues.   The pt will complete home management tasks with 80% accuracy and minimal cues.                        Patient Education     Patient provided with verbal  education regarding SLP POC.  Understanding was verbalized, however additional teaching warranted.    Plan     Will continue to follow and tx as appropriate.    SLP Follow-Up:  Yes   Patient to be seen:  5 x/week   Plan of Care expires:  03/21/25  Plan of Care reviewed with:  patient     Time Tracking     SLP Treatment Date:   03/11/25  Speech Start Time:  1635  Speech Stop Time:  1650     Speech Total Time (min):  15 min    Billable minutes:  Cognitive Skills Intervention, 15 minutes     03/11/2025

## 2025-03-11 NOTE — AI DETERIORATION ALERT
Artificial Intelligence Notification  [facility]  [address] [place] [address]  Phone: [phone]    This documentation was triggered by an Artificial Intelligence Notification:    Admit Date: 3/4/2025   LOS: 7  Code Status: Full Code  : 1950  Age: 74 y.o.  Weight:   Wt Readings from Last 1 Encounters:   25 52.6 kg (116 lb)        Sex: female  Bed: [address] [place] [address] A  MRN: [id number]  Attending Physician: Roderick Iglesias MD     Date of Alert: 2025  Time AI Alert Received: 1182            Vitals:    25 1312   BP: 116/72   Pulse:    Resp:    Temp:      SpO2: 96 %      Artificial Intelligence alert discussed with Nurse:     Name: Madison Johnson RN   Date/Time of Provider Notification: 3/11/2025 At 1307      Patient Condition: After reviewing pt's EMR I went to [address] [place] [address] to speak with nurse about pt's condition. Pt orthostatic and becomes hypotensive when stands. Nurse Madison Johnson states attending physician is aware. Pt currently receiving IV fluid bolus. Pt in no acute distress, sitting in recliner. Most current blood pressure noted above. Defer all care to primary team at this time.

## 2025-03-11 NOTE — PT/OT/SLP PROGRESS
Occupational Therapy   Treatment    Name: Ana Maria Callejas  MRN: [id number]  Admitting Diagnosis:  AMS (altered mental status)       Recommendations:     Recommended therapy intensity at discharge: Moderate Intensity Therapy   Discharge Equipment Recommendations:  to be determined by next level of care  Barriers to discharge:  Decreased caregiver support    Assessment:     Ana Maria Callejas is a 74 y.o. female with a medical diagnosis of AMS (altered mental status).  She presents with good participation. Performance deficits affecting function are weakness, impaired endurance, decreased upper extremity function, impaired self care skills, impaired functional mobility, gait instability, impaired cardiopulmonary response to activity.     Rehab Prognosis:  Good; patient would benefit from acute skilled OT services to address these deficits and reach maximum level of function.       Plan:     Patient to be seen 4 x/week to address the above listed problems via self-care/home management, therapeutic activities, therapeutic exercises, neuromuscular re-education  Plan of Care Expires: 04/09/25  Plan of Care Reviewed with: patient    Subjective     Pain/Comfort:  Pain Rating 1: 0/10    Objective:     Communicated with: nurse prior to and following session.  Patient found up in chair with peripheral IV upon OT entry to room.    General Precautions: Standard, fall    Orthopedic Precautions:N/A  Braces: N/A  Respiratory Status: Room air  Vital Signs: Orthostatics: Sitting 123/63, Standing 67/34     Occupational Performance:     Functional Mobility/Transfers:  Patient completed Sit <> Stand Transfer with contact guard assistance and minimum assistance  with  rolling walker     Therapeutic Positioning    OT interventions performed during the course of today's session in an effort to prevent and/or reduce acquired pressure injuries:   Therapeutic positioning was provided at the conclusion of session to offload all bony prominences for the  prevention and/or reduction of pressure injuries    [facility] 6 Click ADL: 21    Patient Education:  Patient and friend  provided with verbal education and demonstrations education regarding fall prevention and safety awareness.  Understanding was verbalized, however additional teaching warranted.      Patient left up in chair with all lines intact and call button in reach.    GOALS:   Multidisciplinary Problems       Occupational Therapy Goals          Problem: Occupational Therapy    Goal Priority Disciplines Outcome Interventions   Occupational Therapy Goal     OT, PT/OT Progressing    Description: Goals to be met by 3/5/2025:    Pt will complete grooming standing at sink with LRAD with SBA.  Pt will complete UB dressing with SBA.  Pt will complete LB dressing with SBA using LRAD.  Pt will complete toileting with SBA using LRAD.  Pt will complete functional mobility to/from toilet and toilet transfer with SBA using LRAD.                         Time Tracking:     OT Date of Treatment: 03/11/25  OT Start Time: 1050  OT Stop Time: 1107  OT Total Time (min): 17 min    Billable Minutes:Therapeutic Activity 17    OT/GARDENIA: GARDENIA     Number of GARDENIA visits since last OT visit: 3    3/11/2025

## 2025-03-11 NOTE — PROGRESS NOTES
[facility] Hospital Medicine Progress Note        Chief Complaint: Inpatient Follow-up    HPI:   74-year-old female with significant history of HTN, hypothyroidism, lupus on prednisone, CellCept was brought to the ED by family with complaints of recurrent falls at home with associated loss of consciousness, falls has been happening since last November.  Patient is lives alone and at baseline was independent of all activities of daily living.  Chest x-ray negative in the ED, hemodynamics stable, CT head was concerning for trace SDH, cerebral atrophy, small-vessel ischemic change, scalp hematoma, CT C-spine with no traumatic injuries, CT angiogram head and neck with no large vessel occlusion.  MRI brain was ordered to further evaluate recurrent falls with loss of consciousness which was negative for any acute pathology, noted left parieto-occipital scalp hematoma.  Echocardiogram with diastolic dysfunction, intact EF.  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  EEG with diffuse slowing, no epileptiform discharges.  Therapy Services evaluated and recommended skilled nursing facility placement, case management consulted for the same.  Patient had temp spike on 3/10 which was evaluated with chest x-ray and had concerns for right-sided pneumonia, two-view chest x-ray was ordered    Interval Hx:   Patient seen at bedside, she was significantly orthostatics positive today with BP dropping from 100s to 60s as far as systolic and patient was symptomatic, no more temp spikes, no other new complaints    Objective/physical exam:  General: In no acute distress, afebrile  Chest: Clear to auscultation bilaterally  Heart: S1, S2, no appreciable murmur  Abdomen: Soft, nontender, BS +  MSK: Warm, no lower extremity edema, no clubbing or cyanosis  Neurologic: Alert and oriented x4, moving all  extremities with good strength     VITAL SIGNS: 24 HRS MIN & MAX LAST   Temp  Min: 98.4 °F (36.9 °C)  Max: 99.3 °F (37.4 °C) 98.7 °F (37.1 °C)   BP  Min: 67/43  Max: 124/73 116/72   Pulse  Min: 89  Max: 93  93   Resp  Min: 18  Max: 18 18   SpO2  Min: 94 %  Max: 96 % 96 %       Recent Labs   Lab 03/11/25 0237   WBC 6.65   RBC 4.02*   HGB 12.6   HCT 35.4*   MCV 88.1   MCH 31.3*   MCHC 35.6   RDW 13.8      MPV 12.1*         Recent Labs   Lab 03/11/25 0237   *   K 3.6      CO2 22*   BUN 11.5   CREATININE 0.69   CALCIUM 8.3*   MG 2.40   ALBUMIN 3.3*   ALKPHOS 52   ALT 20   AST 23   BILITOT 0.7          Microbiology Results (last 7 days)       Procedure Component Value Units Date/Time    Blood Culture [[id number]]  (Normal) Collected: 03/10/25 0934    Order Status: Completed Specimen: Blood from Antecubital, Left Updated: 03/11/25 1101     Blood Culture No Growth At 24 Hours    Blood Culture [[id number]]  (Normal) Collected: 03/10/25 0745    Order Status: Completed Specimen: Blood from Hand, Right Updated: 03/11/25 0901     Blood Culture No Growth At 24 Hours    Blood Culture [[id number]]  (Normal) Collected: 03/04/25 1604    Order Status: Completed Specimen: Blood Updated: 03/09/25 1902     Blood Culture No Growth at 5 days    Blood Culture [[id number]]  (Normal) Collected: 03/04/25 1604    Order Status: Completed Specimen: Blood Updated: 03/09/25 1902     Blood Culture No Growth at 5 days             Scheduled Med:   levothyroxine  75 mcg Oral Before breakfast    predniSONE  5 mg Oral Daily    sodium chloride  1,000 mg Oral BID          Assessment/Plan:    Positive orthostatics-symptomatic  Right-sided pneumonia-HA P   Sepsis secondary to above  Recurrent falls/syncope with collapse  Severe physical deconditioning   Acute on chronic hyponatremia secondary to SIADH  Mild hypokalemia  Traumatic scalp hematoma  Immunosuppressed state   History of lupus on prednisone/CellCept  History of hypothyroidism    History of essential HTN   History of bronchial asthma with no acute exacerbation   Prophylaxis      Orthostatics heavily positive   I gave her 1 L bolus   Amlodipine held   Hold off on maintenance fluids given SIADH   Fludrocortisone added   Recheck orthostatics a.m.  Workup for fever spike on 03/10 significant for right-sided pneumonia   MRSA PCR negative   Low suspicion for aspiration   Will treat with Levaquin x7 days  No large vessel occlusion   Stroke ruled out   No SDH   Echo with intact EF, negative bubble study   Falls likely secondary to severe physical deconditioning and positive orthostatics  Continue therapy services   Sodium improving on fluid restriction-1.5 L salt tablets   Nephrology signed off   Continue home dose levothyroxine   TSH is within normal limits   I am holding CellCept given active infection, resume after antibiotic course is completed   Continue prednisone  DVT prophylaxis-subQ Lovenox    Patient accepted to rehab today   Holding DC given positive orthostatics   Rechecked tomorrow a.m.        Josefina Narayan MD   03/11/2025

## 2025-03-11 NOTE — PROGRESS NOTES
Inpatient Nutrition Assessment    Admit Date: 3/4/2025   Total duration of encounter: 7 days   Patient Age: 74 y.o.    Nutrition Recommendation/Prescription     Continue regular diet.   Encourage small and frequent meals.   Changed oral supplement to Boost Very High Calorie (provides 530 kcal, 22 g protein per serving) BID.  Replace electrolytes.     Communication of Recommendations: reviewed with nurse    Nutrition Assessment     Malnutrition Assessment/Nutrition-Focused Physical Exam  Deferred                                                              A minimum of two characteristics is recommended for diagnosis of either severe or non-severe malnutrition.    Chart Review    Reason Seen: length of stay    Malnutrition Screening Tool Results   Have you recently lost weight without trying?: No  Have you been eating poorly because of a decreased appetite?: No   MST Score: 0   Diagnosis:  Recurrent falls  - With last two falls involving +LOC   Acute metabolic encephalopathy - improved  Scalp hematoma 2/2 fall  Subdural hemorrhage   Hyponatremia   Lupus on mycophenolate, prednisone  Anxiety    Relevant Medical History: Lupus     Scheduled Medications:  levothyroxine, 75 mcg, Before breakfast  predniSONE, 5 mg, Daily  sodium chloride, 1,000 mg, BID    Continuous Infusions:   PRN Medications:  acetaminophen, 1,000 mg, Q6H PRN  bisacodyL, 10 mg, Daily PRN  haloperidol lactate, 5 mg, Q6H PRN  labetalol, 10 mg, Q6H PRN  melatonin, 9 mg, Nightly PRN  ondansetron, 4 mg, Q8H PRN    Calorie Containing IV Medications: no significant kcals from medications at this time    Recent Labs   Lab 03/04/25  1503 03/05/25  0138 03/05/25  0138 03/05/25  0636 03/05/25  0924 03/06/25  0402 03/07/25  0755 03/08/25  0752 03/08/25  1356 03/08/25  2213 03/09/25  0457 03/10/25  0535 03/11/25  0237   NA  --  133*   < >  --   --  131* 126* 127* 124* 127* 127* 132* 135*   K  --  3.5   < >  --   --  3.3* 3.8 3.9 3.7 3.5 3.4* 3.4* 3.6   CALCIUM  --   8.4   < >  --   --  8.1* 7.8* 7.9* 8.0* 8.1* 8.0* 8.2* 8.3*   PHOS  --  2.8  --   --   --   --   --   --   --   --   --  2.5  --    MG  --  2.10  --   --   --   --   --   --   --   --   --  2.40 2.40   CL  --  100   < >  --   --  98 97* 96* 95* 98 98 97* 100   CO2  --  21*   < >  --   --  19* 20* 22* 20* 21* 22* 23 22*   BUN  --  6.8*   < >  --   --  8.6* 10.0 8.2* 8.5* 8.1* 6.1* 8.7* 11.5   CREATININE  --  0.75   < >  --   --  0.65 0.64 0.62 0.63 0.67 0.64 0.68 0.69   EGFRNORACEVR  --  >60   < >  --   --  >60 >60 >60 >60 >60 >60 >60 >60   GLUCOSE  --  107   < >  --   --  90 105 96 109 85 86 97 93   BILITOT  --  0.5  --   --   --   --   --   --   --   --  0.7 0.7 0.7   ALKPHOS  --  52  --   --   --   --   --   --   --   --  51 54 52   ALT  --  21  --   --   --   --   --   --   --   --  19 21 20   AST  --  39*  --   --   --   --   --   --   --   --  26 25 23   ALBUMIN  --  3.6  --   --   --   --   --   --   --   --  3.4 3.3* 3.3*   TRIG 65  --   --   --   --   --   --   --   --   --   --   --   --    HGBA1C  --  5.2  --   --   --   --   --   --   --   --   --   --   --    AMMONIA  --   --   --   --  24.8  --   --   --   --   --   --   --   --    WBC  --   --   --  4.53  --  4.06* 4.26*  --   --   --  3.48* 7.35 6.65   HGB  --   --   --  12.5  --  12.7 12.4  --   --   --  12.2 12.6 12.6   HCT  --   --   --  36.8*  --  35.8* 34.7*  --   --   --  34.3* 34.8* 35.4*    < > = values in this interval not displayed.     Nutrition Orders:  Diet Adult Regular Standard Tray  Dietary nutrition supplements BID; Boost Very High Calorie Nutritional Drink - Any flavor    Appetite/Oral Intake: fair/not applicable  Factors Affecting Nutritional Intake: decreased appetite  Social Needs Impacting Access to Food: unable to assess at this time; will attempt on follow-up  Food/Temple/Cultural Preferences: none reported  Food Allergies: none reported  Last Bowel Movement: 03/09/25  Wound(s):      Comments    3/11/25 With other  "professionals during rounds. Per EMR, fair appetite. Has Boost Plus ordered with all meals. 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 Potassium being replaced as needed. Will follow-up early.     Anthropometrics    Height: 5' 7" (170.2 cm), Height Method: Stated  Last Weight: 52.6 kg (116 lb) (03/05/25 1343), Weight Method: Bed Scale  BMI (Calculated): 18.2  BMI Classification: underweight (BMI less than 18.5)     Ideal Body Weight (IBW), Female: 135 lb     % Ideal Body Weight, Female (lb): 85.93 %                             Usual Weight Provided By: unable to obtain usual weight    Wt Readings from Last 5 Encounters:   03/05/25 52.6 kg (116 lb)     Weight Change(s) Since Admission:   Wt Readings from Last 1 Encounters:   03/05/25 1343 52.6 kg (116 lb)   03/04/25 1126 52.2 kg (115 lb)   Admit Weight: 52.2 kg (115 lb) (03/04/25 1126), Weight Method: Estimated    Estimated Needs    Weight Used For Calorie Calculations: 52.6 kg (115 lb 15.4 oz)  Energy Calorie Requirements (kcal): 1578-1841kcals/d (30-35kcals/kg)  Energy Need Method: Kcal/kg  Weight Used For Protein Calculations: 52.6 kg (115 lb 15.4 oz)  Protein Requirements: 68-79g/d (1.3-1.5g/kg)  Fluid Requirements (mL): 1500mL fl/d (per MD)        Enteral Nutrition     Patient not receiving enteral nutrition at this time.    Parenteral Nutrition     Patient not receiving parenteral nutrition support at this time.    Evaluation of Received Nutrient Intake    Calories: not meeting estimated needs  Protein: not meeting estimated needs    Patient Education     Not applicable.    Nutrition Diagnosis     PES: Increased nutrient needs (kcals/protein) related to inability to consume sufficient nutrients as evidenced by underweight BMI (18.17). (new)     PES:            Nutrition Interventions     Intervention(s): general/healthful diet, commercial beverage, prescription " medication, and collaboration with other providers  Intervention(s):      Goal: Meet greater than 80% of nutritional needs by follow-up. (new)  Goal: Consume % of oral supplements by follow-up. (new)    Nutrition Goals & Monitoring     Dietitian will monitor: food and beverage intake, weight change, electrolyte/renal panel, glucose/endocrine profile, and gastrointestinal profile  Discharge planning: continue regular diet with high calorie/protein oral supplements  Nutrition Risk/Follow-Up: moderate (follow-up in 3-5 days)   Please consult if re-assessment needed sooner.

## 2025-03-11 NOTE — PT/OT/SLP PROGRESS
Occupational Therapy   Treatment    Name: Ana Maria Callejas  MRN: [id number]  Admitting Diagnosis:  AMS (altered mental status)       Recommendations:     Recommended therapy intensity at discharge: Moderate Intensity Therapy   Discharge Equipment Recommendations:  to be determined by next level of care  Barriers to discharge:       Assessment:     Ana Maria Callejas is a 74 y.o. female with a medical diagnosis of AMS (altered mental status).  She presents with good participation. Performance deficits affecting function are weakness, impaired endurance, decreased upper extremity function, impaired self care skills, impaired functional mobility, gait instability, impaired cardiopulmonary response to activity.     Rehab Prognosis:  Good; patient would benefit from acute skilled OT services to address these deficits and reach maximum level of function.       Plan:     Patient to be seen 4 x/week to address the above listed problems via self-care/home management, therapeutic activities, therapeutic exercises, neuromuscular re-education  Plan of Care Expires: 04/09/25  Plan of Care Reviewed with: patient    Subjective     Pain/Comfort:  Pain Rating 1: 0/10    Objective:     Communicated with: nurse prior to session.  Patient found HOB elevated with peripheral IV upon OT entry to room.    General Precautions: Standard, fall    Orthopedic Precautions:N/A  Braces: N/A  Respiratory Status: Room air  Vital Signs: Orthostatics: Sitting 123/80, Standing 69/40 during G/H tasks at sink     Occupational Performance:     Bed Mobility:    Patient completed Supine to Sit with minimum assistance     Functional Mobility/Transfers:  Patient completed Sit <> Stand Transfer with minimum assistance  with  rolling walker   Patient completed Toilet Transfer Step Transfer technique with minimum assistance with  rolling walker  Functional Mobility: ambulate to and from bathroom with Min A with RW    Activities of Daily Living:  Grooming: contact guard  assistance standing at the sink for oral care and washing face  Lower Body Dressing: SBA don socks sitting edge of bed    Therapeutic Positioning    OT interventions performed during the course of today's session in an effort to prevent and/or reduce acquired pressure injuries:   Therapeutic positioning was provided at the conclusion of session to offload all bony prominences for the prevention and/or reduction of pressure injuries    [facility] 6 Click ADL: 21    Patient Education:  Patient provided with verbal education and demonstrations education regarding fall prevention and safety awareness.  Understanding was verbalized, however additional teaching warranted.      Patient left up in chair with all lines intact and call button in reach.    GOALS:   Multidisciplinary Problems       Occupational Therapy Goals          Problem: Occupational Therapy    Goal Priority Disciplines Outcome Interventions   Occupational Therapy Goal     OT, PT/OT Progressing    Description: Goals to be met by 3/5/2025:    Pt will complete grooming standing at sink with LRAD with SBA.  Pt will complete UB dressing with SBA.  Pt will complete LB dressing with SBA using LRAD.  Pt will complete toileting with SBA using LRAD.  Pt will complete functional mobility to/from toilet and toilet transfer with SBA using LRAD.                         Time Tracking:     OT Date of Treatment: 03/11/25  OT Start Time: 0901  OT Stop Time: 0943  OT Total Time (min): 42 min    Billable Minutes:Self Care/Home Management 25  Therapeutic Activity 17    OT/GARDENIA: GARDENIA     Number of GARDENIA visits since last OT visit: 3    3/11/2025

## 2025-03-11 NOTE — PLAN OF CARE
Problem: Adult Inpatient Plan of Care  Goal: Plan of Care Review  Outcome: Progressing  Flowsheets (Taken 3/11/2025 1032)  Plan of Care Reviewed With: patient  Goal: Patient-Specific Goal (Individualized)  Outcome: Progressing  Flowsheets (Taken 3/11/2025 1032)  Individualized Care Needs: to go home after rehab  Anxieties, Fears or Concerns: to go home after rehab  Patient/Family-Specific Goals (Include Timeframe): to go home after rehab  Goal: Absence of Hospital-Acquired Illness or Injury  Outcome: Progressing  Intervention: Identify and Manage Fall Risk  Flowsheets (Taken 3/11/2025 1032)  Safety Promotion/Fall Prevention:   assistive device/personal item within reach   nonskid shoes/socks when out of bed   side rails raised x 2  Intervention: Prevent Skin Injury  Flowsheets (Taken 3/11/2025 1032)  Body Position: position changed independently  Skin Protection: incontinence pads utilized  Device Skin Pressure Protection: positioning supports utilized  Intervention: Prevent and Manage VTE (Venous Thromboembolism) Risk  Flowsheets (Taken 3/11/2025 1032)  VTE Prevention/Management: remove, assess skin, and reapply sequential compression device  Goal: Optimal Comfort and Wellbeing  Outcome: Progressing  Goal: Readiness for Transition of Care  Outcome: Progressing

## 2025-03-12 LAB
ANION GAP SERPL CALC-SCNC: 8 MEQ/L
BUN SERPL-MCNC: 12.4 MG/DL (ref 9.8–20.1)
CALCIUM SERPL-MCNC: 8.1 MG/DL (ref 8.4–10.2)
CHLORIDE SERPL-SCNC: 103 MMOL/L (ref 98–107)
CO2 SERPL-SCNC: 24 MMOL/L (ref 23–31)
CREAT SERPL-MCNC: 0.65 MG/DL (ref 0.55–1.02)
CREAT/UREA NIT SERPL: 19
GFR SERPLBLD CREATININE-BSD FMLA CKD-EPI: >60 ML/MIN/1.73/M2
GLUCOSE SERPL-MCNC: 91 MG/DL (ref 82–115)
POTASSIUM SERPL-SCNC: 3.5 MMOL/L (ref 3.5–5.1)
SODIUM SERPL-SCNC: 135 MMOL/L (ref 136–145)

## 2025-03-12 PROCEDURE — 36415 COLL VENOUS BLD VENIPUNCTURE: CPT | Performed by: INTERNAL MEDICINE

## 2025-03-12 PROCEDURE — 25000003 PHARM REV CODE 250: Performed by: NURSE PRACTITIONER

## 2025-03-12 PROCEDURE — 80048 BASIC METABOLIC PNL TOTAL CA: CPT | Performed by: INTERNAL MEDICINE

## 2025-03-12 PROCEDURE — 25000242 PHARM REV CODE 250 ALT 637 W/ HCPCS: Performed by: INTERNAL MEDICINE

## 2025-03-12 PROCEDURE — 63600175 PHARM REV CODE 636 W HCPCS: Performed by: HOSPITALIST

## 2025-03-12 PROCEDURE — 25000003 PHARM REV CODE 250: Performed by: INTERNAL MEDICINE

## 2025-03-12 PROCEDURE — 63600175 PHARM REV CODE 636 W HCPCS: Performed by: INTERNAL MEDICINE

## 2025-03-12 PROCEDURE — 97530 THERAPEUTIC ACTIVITIES: CPT | Mod: CQ

## 2025-03-12 PROCEDURE — 25000003 PHARM REV CODE 250: Performed by: HOSPITALIST

## 2025-03-12 PROCEDURE — 21400001 HC TELEMETRY ROOM

## 2025-03-12 RX ORDER — SODIUM CHLORIDE, SODIUM LACTATE, POTASSIUM CHLORIDE, CALCIUM CHLORIDE 600; 310; 30; 20 MG/100ML; MG/100ML; MG/100ML; MG/100ML
INJECTION, SOLUTION INTRAVENOUS CONTINUOUS
Status: ACTIVE | OUTPATIENT
Start: 2025-03-12 | End: 2025-03-13

## 2025-03-12 RX ORDER — MIDODRINE HYDROCHLORIDE 2.5 MG/1
2.5 TABLET ORAL 2 TIMES DAILY WITH MEALS
Status: DISCONTINUED | OUTPATIENT
Start: 2025-03-12 | End: 2025-03-14

## 2025-03-12 RX ADMIN — PREDNISONE 5 MG: 5 TABLET ORAL at 08:03

## 2025-03-12 RX ADMIN — LEVOFLOXACIN 750 MG: 500 TABLET, FILM COATED ORAL at 08:03

## 2025-03-12 RX ADMIN — Medication 9 MG: at 08:03

## 2025-03-12 RX ADMIN — TROSPIUM CHLORIDE 20 MG: 20 TABLET ORAL at 08:03

## 2025-03-12 RX ADMIN — SODIUM CHLORIDE, POTASSIUM CHLORIDE, SODIUM LACTATE AND CALCIUM CHLORIDE: 600; 310; 30; 20 INJECTION, SOLUTION INTRAVENOUS at 08:03

## 2025-03-12 RX ADMIN — SODIUM CHLORIDE 1000 MG: 1 TABLET ORAL at 08:03

## 2025-03-12 RX ADMIN — FLUDROCORTISONE ACETATE 100 MCG: 0.1 TABLET ORAL at 08:03

## 2025-03-12 RX ADMIN — LEVOTHYROXINE SODIUM 75 MCG: 0.05 TABLET ORAL at 05:03

## 2025-03-12 RX ADMIN — ACETAMINOPHEN 1000 MG: 500 TABLET ORAL at 08:03

## 2025-03-12 NOTE — PROGRESS NOTES
[facility] Hospital Medicine Progress Note        Chief Complaint: Inpatient Follow-up    HPI:   74-year-old female with significant history of HTN, hypothyroidism, lupus on prednisone, CellCept was brought to the ED by family with complaints of recurrent falls at home with associated loss of consciousness, falls has been happening since last November.  Patient is lives alone and at baseline was independent of all activities of daily living.  Chest x-ray negative in the ED, hemodynamics stable, CT head was concerning for trace SDH, cerebral atrophy, small-vessel ischemic change, scalp hematoma, CT C-spine with no traumatic injuries, CT angiogram head and neck with no large vessel occlusion.  MRI brain was ordered to further evaluate recurrent falls with loss of consciousness which was negative for any acute pathology, noted left parieto-occipital scalp hematoma.  Echocardiogram with diastolic dysfunction, intact EF.  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  EEG with diffuse slowing, no epileptiform discharges.  Therapy Services evaluated and recommended skilled nursing facility placement, case management consulted for the same.  Patient had temp spike on 3/10 which was evaluated with chest x-ray and had concerns for right-sided pneumonia, two-view chest x-ray was ordered.  Two-view chest x-ray confirmed right-sided pneumonia, initiated Levaquin, MRSA PCR negative, no concern for aspiration, patient also significantly orthostatics positive on 03/11, treated with IV fluids, initiated fludrocortisone    Interval Hx:   Patient seen at bedside, patient is still orthostatics positive, no new complaints, no shortness a breath, no fever spikes, no acute events overnight      Objective/physical exam:  General: In no acute distress, afebrile  Chest: Clear to auscultation  bilaterally  Heart: S1, S2, no appreciable murmur  Abdomen: Soft, nontender, BS +  MSK: Warm, no lower extremity edema, no clubbing or cyanosis  Neurologic: Alert and oriented x4,   VITAL SIGNS: 24 HRS MIN & MAX LAST   Temp  Min: 98.6 °F (37 °C)  Max: 98.8 °F (37.1 °C) 98.8 °F (37.1 °C)   BP  Min: 67/43  Max: 131/68 131/68   Pulse  Min: 89  Max: 93  91   Resp  Min: 17  Max: 18 17   SpO2  Min: 95 %  Max: 97 % 95 %       Recent Labs   Lab 03/11/25 0237   WBC 6.65   RBC 4.02*   HGB 12.6   HCT 35.4*   MCV 88.1   MCH 31.3*   MCHC 35.6   RDW 13.8      MPV 12.1*         Recent Labs   Lab 03/11/25 0237   *   K 3.6      CO2 22*   BUN 11.5   CREATININE 0.69   CALCIUM 8.3*   MG 2.40   ALBUMIN 3.3*   ALKPHOS 52   ALT 20   AST 23   BILITOT 0.7          Microbiology Results (last 7 days)       Procedure Component Value Units Date/Time    Blood Culture [[id number]]  (Normal) Collected: 03/10/25 0934    Order Status: Completed Specimen: Blood from Antecubital, Left Updated: 03/11/25 1101     Blood Culture No Growth At 24 Hours    Blood Culture [[id number]]  (Normal) Collected: 03/10/25 0745    Order Status: Completed Specimen: Blood from Hand, Right Updated: 03/11/25 0901     Blood Culture No Growth At 24 Hours    Blood Culture [[id number]]  (Normal) Collected: 03/04/25 1604    Order Status: Completed Specimen: Blood Updated: 03/09/25 1902     Blood Culture No Growth at 5 days    Blood Culture [[id number]]  (Normal) Collected: 03/04/25 1604    Order Status: Completed Specimen: Blood Updated: 03/09/25 1902     Blood Culture No Growth at 5 days             Scheduled Med:   fludrocortisone  100 mcg Oral Daily    levoFLOXacin  750 mg Oral Daily    levothyroxine  75 mcg Oral Before breakfast    predniSONE  5 mg Oral Daily    sodium chloride  1,000 mg Oral BID    trospium  20 mg Oral BID          Assessment/Plan:    Positive orthostatics-symptomatic  Right-sided pneumonia-HA P   Sepsis secondary to above  Recurrent  falls/syncope with collapse  Severe physical deconditioning   Acute on chronic hyponatremia secondary to SIADH, improved  Traumatic scalp hematoma  Immunosuppressed state   History of lupus on prednisone/CellCept  History of hypothyroidism   History of essential HTN   History of bronchial asthma with no acute exacerbation   Prophylaxis      Orthostatics still positive   Received 1 L fluid yesterday   On fludrocortisone and amlodipine held   I have added low-dose midodrine   Ringer lactate at 60 cc/hour times 12 hours   Recheck orthostatics a.m.  Sodium continues to improve-135 today, on salt tablets  Was on fluid restriction until day before yesterday, but received IV fluids yesterday and today given positive orthostatics   Continue to monitor  Workup for fever spike on 03/10 significant for right-sided pneumonia   MRSA PCR negative   Low suspicion for aspiration   Will treat with Levaquin x7 days-day 2/7, QTC normal  No large vessel occlusion   Stroke ruled out   No SDH   Echo with intact EF, negative bubble study   Falls likely secondary to severe physical deconditioning and positive orthostatics  Continue therapy services   Nephrology signed off   Continue home dose levothyroxine   TSH is within normal limits   I am holding CellCept given active infection, resume after antibiotic course is completed   Continue prednisone  DVT prophylaxis-subQ Lovenox      And has acceptance to rehab   Holding DC given positive orthostatics   If orthostatics vital signs better tomorrow she can go to rehab        Josefina Narayan MD   03/12/2025

## 2025-03-12 NOTE — PT/OT/SLP PROGRESS
Physical Therapy Treatment    Patient Name:  Ana Maria Callejas   MRN:  [id number]    Recommendations:     Discharge therapy intensity: Moderate Intensity Therapy   Discharge Equipment Recommendations: walker, rolling  Barriers to discharge: Impaired mobility and Ongoing medical needs    Assessment:     Ana Maria Callejas is a 74 y.o. female admitted with a medical diagnosis of acute metabolic encephalopathy, scalp hematoma, mild SDH, milk hyponatremia.  She presents with the following impairments/functional limitations: weakness, impaired endurance, impaired self care skills, impaired functional mobility, gait instability, impaired balance, impaired cognition.    Patient continues to be limited with functional mobility 2/2 +orthostatics.    Rehab Prognosis: Good; patient would benefit from acute skilled PT services to address these deficits and reach maximum level of function.    Recent Surgery: * No surgery found *      Plan:     During this hospitalization, patient would benefit from acute PT services 5 x/week to address the identified rehab impairments via gait training, therapeutic activities, therapeutic exercises and progress toward the following goals:    Plan of Care Expires:  25    Subjective     Chief Complaint: cold  Patient/Family Comments/goals: none stated   Pain/Comfort:         Objective:     Communicated with NSG prior to session.  Patient found HOB elevated with peripheral IV, telemetry upon PT entry to room.     General Precautions: Standard, fall  Orthopedic Precautions: N/A  Braces: N/A  Respiratory Status: Room air  Blood Pressure:    Semi supine: 156/84    Seated: 125/73 HR 97   Seated post ~3 mins: 118/71    Seated post thera ex: 102/66    Standin/51    Seated: 112/69    Seated post t/f legs elevated: 123/71   Skin Integrity: Visible skin intact      Functional Mobility:  Bed Mobility:  Supine to sit: SBA  Transfers:     Sit to Stand: contact guard assistance   with rolling walker  Bed to chair: CGA w/RW via step t/f  Gait: deferred at this time 2/2 low BP.   Balance: SBA for static sitting balance; CGA for static standing balance    Therapeutic Exercises:  Heel/Toe raises x20    Education:  Patient provided with verbal education education regarding PT role/goals/POC, fall prevention, safety awareness, and discharge/DME recommendations.  Understanding was verbalized.     Patient left supine with all lines intact, call button in reach, and nurse notified    GOALS:   Multidisciplinary Problems       Physical Therapy Goals          Problem: Physical Therapy    Goal Priority Disciplines Outcome Interventions   Physical Therapy Goal     PT, PT/OT Progressing    Description: Goals to be met by: 2025     Patient will increase functional independence with mobility by performin. Supine to sit with Stand-by Assistance  2. Sit to stand transfer with Stand-by Assistance  3. Gait  x 150 feet with Stand-by Assistance using Rolling Walker or LRAD.                          Time Tracking:     PT Received On: 25  PT Start Time: 0951     PT Stop Time: 1008  PT Total Time (min): 17 min     Billable Minutes: Therapeutic Activity 17    Treatment Type: Treatment  PT/PTA: PTA     Number of PTA visits since last PT visit: 4     2025

## 2025-03-12 NOTE — PROGRESS NOTES
03/12/25 1445   Missed Time Reason   SLP Attempted Eval Date 03/12/25   SLP Attempted Eval Time 1445   Missed Treatment Reason Patient unwilling to participate     SLP attempting to see pt for therapy session however pt reporting not feeling well from low BP today and stress from not being able to transfer to rehab. Pt requesting to rest today. SLP to continue to follow up and treat as appropriate.

## 2025-03-13 PROBLEM — E44.0 MODERATE MALNUTRITION: Status: ACTIVE | Noted: 2025-03-13

## 2025-03-13 LAB
ANION GAP SERPL CALC-SCNC: 6 MEQ/L
BUN SERPL-MCNC: 12.2 MG/DL (ref 9.8–20.1)
CALCIUM SERPL-MCNC: 8.2 MG/DL (ref 8.4–10.2)
CHLORIDE SERPL-SCNC: 104 MMOL/L (ref 98–107)
CO2 SERPL-SCNC: 24 MMOL/L (ref 23–31)
CREAT SERPL-MCNC: 0.68 MG/DL (ref 0.55–1.02)
CREAT/UREA NIT SERPL: 18
GFR SERPLBLD CREATININE-BSD FMLA CKD-EPI: >60 ML/MIN/1.73/M2
GLUCOSE SERPL-MCNC: 94 MG/DL (ref 82–115)
POTASSIUM SERPL-SCNC: 3.3 MMOL/L (ref 3.5–5.1)
SODIUM SERPL-SCNC: 134 MMOL/L (ref 136–145)

## 2025-03-13 PROCEDURE — 97530 THERAPEUTIC ACTIVITIES: CPT | Mod: CO

## 2025-03-13 PROCEDURE — 80048 BASIC METABOLIC PNL TOTAL CA: CPT | Performed by: INTERNAL MEDICINE

## 2025-03-13 PROCEDURE — 63600175 PHARM REV CODE 636 W HCPCS: Performed by: HOSPITALIST

## 2025-03-13 PROCEDURE — 25000003 PHARM REV CODE 250: Performed by: HOSPITALIST

## 2025-03-13 PROCEDURE — 25000003 PHARM REV CODE 250: Performed by: INTERNAL MEDICINE

## 2025-03-13 PROCEDURE — 97530 THERAPEUTIC ACTIVITIES: CPT | Mod: CQ

## 2025-03-13 PROCEDURE — 25000003 PHARM REV CODE 250: Performed by: NURSE PRACTITIONER

## 2025-03-13 PROCEDURE — 36415 COLL VENOUS BLD VENIPUNCTURE: CPT | Performed by: INTERNAL MEDICINE

## 2025-03-13 PROCEDURE — 97535 SELF CARE MNGMENT TRAINING: CPT | Mod: CO

## 2025-03-13 PROCEDURE — 21400001 HC TELEMETRY ROOM

## 2025-03-13 PROCEDURE — 25000242 PHARM REV CODE 250 ALT 637 W/ HCPCS: Performed by: INTERNAL MEDICINE

## 2025-03-13 PROCEDURE — 97116 GAIT TRAINING THERAPY: CPT | Mod: CQ

## 2025-03-13 RX ADMIN — PREDNISONE 5 MG: 5 TABLET ORAL at 08:03

## 2025-03-13 RX ADMIN — Medication 9 MG: at 09:03

## 2025-03-13 RX ADMIN — MIDODRINE HYDROCHLORIDE 2.5 MG: 2.5 TABLET ORAL at 05:03

## 2025-03-13 RX ADMIN — SODIUM CHLORIDE 1000 MG: 1 TABLET ORAL at 08:03

## 2025-03-13 RX ADMIN — FLUDROCORTISONE ACETATE 100 MCG: 0.1 TABLET ORAL at 08:03

## 2025-03-13 RX ADMIN — LEVOFLOXACIN 750 MG: 500 TABLET, FILM COATED ORAL at 08:03

## 2025-03-13 RX ADMIN — TROSPIUM CHLORIDE 20 MG: 20 TABLET ORAL at 08:03

## 2025-03-13 RX ADMIN — TROSPIUM CHLORIDE 20 MG: 20 TABLET ORAL at 09:03

## 2025-03-13 RX ADMIN — MIDODRINE HYDROCHLORIDE 2.5 MG: 2.5 TABLET ORAL at 08:03

## 2025-03-13 RX ADMIN — ACETAMINOPHEN 1000 MG: 500 TABLET ORAL at 09:03

## 2025-03-13 RX ADMIN — SODIUM CHLORIDE 1000 MG: 1 TABLET ORAL at 09:03

## 2025-03-13 RX ADMIN — LEVOTHYROXINE SODIUM 75 MCG: 0.05 TABLET ORAL at 05:03

## 2025-03-13 NOTE — PLAN OF CARE
Problem: Adult Inpatient Plan of Care  Goal: Plan of Care Review  Outcome: Progressing  Goal: Patient-Specific Goal (Individualized)  Outcome: Progressing  Goal: Absence of Hospital-Acquired Illness or Injury  Outcome: Progressing  Goal: Optimal Comfort and Wellbeing  Outcome: Progressing  Goal: Readiness for Transition of Care  Outcome: Progressing     Problem: Stroke, Ischemic (Includes Transient Ischemic Attack)  Goal: Optimal Coping  Outcome: Progressing  Goal: Effective Bowel Elimination  Outcome: Progressing  Goal: Optimal Cerebral Tissue Perfusion  Outcome: Progressing  Goal: Optimal Cognitive Function  Outcome: Progressing  Goal: Improved Communication Skills  Outcome: Progressing  Goal: Optimal Functional Ability  Outcome: Progressing  Goal: Optimal Nutrition Intake  Outcome: Progressing  Goal: Effective Oxygenation and Ventilation  Outcome: Progressing  Goal: Improved Sensorimotor Function  Outcome: Progressing  Goal: Safe and Effective Swallow  Outcome: Progressing  Goal: Effective Urinary Elimination  Outcome: Progressing     Problem: Fall Injury Risk  Goal: Absence of Fall and Fall-Related Injury  Outcome: Progressing     Problem: Restraint, Nonviolent  Goal: Absence of Harm or Injury  Outcome: Progressing

## 2025-03-13 NOTE — PROGRESS NOTES
Inpatient Nutrition Assessment    Admit Date: 3/4/2025   Total duration of encounter: 9 days   Patient Age: 74 y.o.    Nutrition Recommendation/Prescription     Continue regular diet.   Encourage small and frequent meals.   Boost Very High Calorie (provides 530 kcal, 22 g protein per serving) BID.  Replace electrolytes.   Consider adding appetite stimulant if oral intake remains inadequate.     Communication of Recommendations: reviewed with patient    Nutrition Assessment     Malnutrition Assessment/Nutrition-Focused Physical Exam  Malnutrition Context: acute illness or injury (03/13/25 1252)  Malnutrition Level: moderate (03/13/25 1252)        Subcutaneous Fat (Malnutrition): mild depletion (03/13/25 1252)     Upper Arm Region (Subcutaneous Fat Loss): mild depletion     Muscle Mass (Malnutrition): moderate depletion (03/13/25 1252)  [place] Region (Muscle Loss): mild depletion  Clavicle Bone Region (Muscle Loss): moderate depletion  Clavicle and Acromion Bone Region (Muscle Loss): moderate depletion                 Fluid Accumulation (Malnutrition): other (see comments) (Not present) (03/13/25 1252)     Hand  Strength, Right (Malnutrition): Unable to assess (03/13/25 1252)  A minimum of two characteristics is recommended for diagnosis of either severe or non-severe malnutrition.    Chart Review    Reason Seen: length of stay and follow-up    Malnutrition Screening Tool Results   Have you recently lost weight without trying?: No  Have you been eating poorly because of a decreased appetite?: No   MST Score: 0   Diagnosis:  Orthostatic hypotension   Recurrent falls and syncope with collapse  Traumatic scalp hematoma due to above  Severe deconditioning  Sepsis secondary to right-sided hospital-acquired pneumonia s/p Rx  Acute on chronic [id number]   Chronic lupus currently on prednisone and CellCept   Immunosuppressed status due to above    Relevant Medical History: Lupus     Scheduled  Medications:  fludrocortisone, 100 mcg, Daily  levoFLOXacin, 750 mg, Daily  levothyroxine, 75 mcg, Before breakfast  midodrine, 2.5 mg, BID WM  predniSONE, 5 mg, Daily  sodium chloride, 1,000 mg, BID  trospium, 20 mg, BID    Continuous Infusions:   PRN Medications:  acetaminophen, 1,000 mg, Q6H PRN  bisacodyL, 10 mg, Daily PRN  haloperidol lactate, 5 mg, Q6H PRN  labetalol, 10 mg, Q6H PRN  melatonin, 9 mg, Nightly PRN  ondansetron, 4 mg, Q8H PRN    Calorie Containing IV Medications: no significant kcals from medications at this time    Recent Labs   Lab 03/07/25  0755 03/08/25  0752 03/08/25  1356 03/08/25  2213 03/09/25  0457 03/10/25  0535 03/11/25  0237 03/12/25  0931 03/13/25  0817   *   < > 124* 127* 127* 132* 135* 135* 134*   K 3.8   < > 3.7 3.5 3.4* 3.4* 3.6 3.5 3.3*   CALCIUM 7.8*   < > 8.0* 8.1* 8.0* 8.2* 8.3* 8.1* 8.2*   PHOS  --   --   --   --   --  2.5  --   --   --    MG  --   --   --   --   --  2.40 2.40  --   --    CL 97*   < > 95* 98 98 97* 100 103 104   CO2 20*   < > 20* 21* 22* 23 22* 24 24   BUN 10.0   < > 8.5* 8.1* 6.1* 8.7* 11.5 12.4 12.2   CREATININE 0.64   < > 0.63 0.67 0.64 0.68 0.69 0.65 0.68   EGFRNORACEVR >60   < > >60 >60 >60 >60 >60 >60 >60   GLUCOSE 105   < > 109 85 86 97 93 91 94   BILITOT  --   --   --   --  0.7 0.7 0.7  --   --    ALKPHOS  --   --   --   --  51 54 52  --   --    ALT  --   --   --   --  19 21 20  --   --    AST  --   --   --   --  26 25 23  --   --    ALBUMIN  --   --   --   --  3.4 3.3* 3.3*  --   --    WBC 4.26*  --   --   --  3.48* 7.35 6.65  --   --    HGB 12.4  --   --   --  12.2 12.6 12.6  --   --    HCT 34.7*  --   --   --  34.3* 34.8* 35.4*  --   --     < > = values in this interval not displayed.     Nutrition Orders:  Diet Adult Regular Standard Tray  Dietary nutrition supplements BID; Boost Very High Calorie Nutritional Drink - Any flavor    Appetite/Oral Intake: poor/25-50% of meals  Factors Affecting Nutritional Intake: decreased appetite  Social  "Needs Impacting Access to Food: unable to assess at this time; will attempt on follow-up  Food/Mandaen/Cultural Preferences: none reported  Food Allergies: none reported  Last Bowel Movement: 03/09/25  Wound(s):      Comments    3/11/25 With other professionals during rounds. Per EMR, fair appetite. Has Boost Plus ordered with all meals. 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 Potassium being replaced as needed. Will follow-up early.     3/13/25 Working with therapy. Reports continued decreased appetite. Drinking some of Boost supplements. Last BM 3/9, no abdominal pain, nausea or vomiting.     Anthropometrics    Height: 5' 7" (170.2 cm), Height Method: Stated  Last Weight: 52.6 kg (116 lb) (03/05/25 1343), Weight Method: Bed Scale  BMI (Calculated): 18.2  BMI Classification: underweight (BMI less than 18.5)     Ideal Body Weight (IBW), Female: 135 lb     % Ideal Body Weight, Female (lb): 85.93 %                             Usual Weight Provided By: unable to obtain usual weight    Wt Readings from Last 5 Encounters:   03/05/25 52.6 kg (116 lb)     Weight Change(s) Since Admission:   Wt Readings from Last 1 Encounters:   03/05/25 1343 52.6 kg (116 lb)   03/04/25 1126 52.2 kg (115 lb)   Admit Weight: 52.2 kg (115 lb) (03/04/25 1126), Weight Method: Estimated    Estimated Needs    Weight Used For Calorie Calculations: 52.6 kg (115 lb 15.4 oz)  Energy Calorie Requirements (kcal): 1578-1841kcals/d (30-35kcals/kg)  Energy Need Method: Kcal/kg  Weight Used For Protein Calculations: 52.6 kg (115 lb 15.4 oz)  Protein Requirements: 68-79g/d (1.3-1.5g/kg)  Fluid Requirements (mL): 1500mL fl/d (per MD)        Enteral Nutrition     Patient not receiving enteral nutrition at this time.    Parenteral Nutrition     Patient not receiving parenteral nutrition support at this time.    Evaluation of Received Nutrient Intake    Calories: " not meeting estimated needs  Protein: not meeting estimated needs    Patient Education     Not applicable.    Nutrition Diagnosis     PES: Increased nutrient needs (kcals/protein) related to inability to consume sufficient nutrients as evidenced by underweight BMI (18.17). (active)     PES: Moderate acute disease or injury related malnutrition Related to inability to consume sufficient nutrients As Evidenced by:  - muscle mass depletion: 2 areas of moderate muscle loss (Deltoid, Clavicle), 3 areas of mild muscle loss (Acromion, Temporalis, Trapezius) - loss of subcutaneous fat: 1 area of mild fat loss (Triceps Skinfold) new    Nutrition Interventions     Intervention(s): general/healthful diet, commercial beverage, prescription medication, and collaboration with other providers  Intervention(s): Oral nutritional supplement;Oral diet/nutrient modifications    Goal: Meet greater than 80% of nutritional needs by follow-up. (goal not met)  Goal: Consume % of oral supplements by follow-up. (goal progressing)    Nutrition Goals & Monitoring     Dietitian will monitor: food and beverage intake, weight change, electrolyte/renal panel, glucose/endocrine profile, and gastrointestinal profile  Discharge planning: continue regular diet with high calorie/protein oral supplements  Nutrition Risk/Follow-Up: high (follow-up in 1-4 days)   Please consult if re-assessment needed sooner.

## 2025-03-13 NOTE — PT/OT/SLP PROGRESS
Occupational Therapy   Treatment    Name: Ana Maria Callejas  MRN: [id number]  Admitting Diagnosis:  AMS (altered mental status)       Recommendations:     Recommended therapy intensity at discharge: Moderate Intensity Therapy   Discharge Equipment Recommendations:  to be determined by next level of care  Barriers to discharge:  Decreased caregiver support    Assessment:     Ana Maria Callejas is a 74 y.o. female with a medical diagnosis of AMS (altered mental status).  She presents with good motivation and participation. Performance deficits affecting function are weakness, impaired endurance, decreased upper extremity function, impaired self care skills, impaired functional mobility, gait instability, impaired cardiopulmonary response to activity.     Rehab Prognosis:  Good; patient would benefit from acute skilled OT services to address these deficits and reach maximum level of function.       Plan:     Patient to be seen 4 x/week to address the above listed problems via self-care/home management, therapeutic activities, therapeutic exercises, neuromuscular re-education  Plan of Care Expires: 04/09/25  Plan of Care Reviewed with: patient    Subjective     Pain/Comfort:  Pain Rating 1: 0/10    Objective:     Communicated with: nurse prior to session.  Patient found up in chair with peripheral IV, telemetry, PureWick upon OT entry to room.    General Precautions: Standard, fall    Orthopedic Precautions:N/A  Braces: N/A  Respiratory Status: Room air  Vital Signs: Orthostatics: Supine 149/77, Sitting 148/78, [id number]/70, and 107/60 following extended standing activity     Occupational Performance:     Functional Mobility/Transfers:  Patient completed Sit <> Stand Transfer with contact guard assistance  with  rolling walker   Functional Mobility: ambulate to and from bathroom with RW with CGA    Activities of Daily Living:  Grooming: contact guard assistance with RW for combing hair, brushing teeth and washing  face    Therapeutic Positioning    OT interventions performed during the course of today's session in an effort to prevent and/or reduce acquired pressure injuries:   Therapeutic positioning was provided at the conclusion of session to offload all bony prominences for the prevention and/or reduction of pressure injuries    [facility] 6 Click ADL: 22    Patient Education:  Patient provided with verbal education and demonstrations education regarding fall prevention and safety awareness.  Understanding was verbalized, however additional teaching warranted.      Patient left up in chair with all lines intact and call button in reach.    GOALS:   Multidisciplinary Problems       Occupational Therapy Goals          Problem: Occupational Therapy    Goal Priority Disciplines Outcome Interventions   Occupational Therapy Goal     OT, PT/OT Progressing    Description: Goals to be met by 3/5/2025:    Pt will complete grooming standing at sink with LRAD with SBA.  Pt will complete UB dressing with SBA.  Pt will complete LB dressing with SBA using LRAD.  Pt will complete toileting with SBA using LRAD.  Pt will complete functional mobility to/from toilet and toilet transfer with SBA using LRAD.                         Time Tracking:     OT Date of Treatment: 03/13/25  OT Start Time: 1127  OT Stop Time: 1154  OT Total Time (min): 27 min    Billable Minutes:Self Care/Home Management 15  Therapeutic Activity 12    OT/GARDENIA: GARDENIA     Number of GARDENIA visits since last OT visit: 4    3/13/2025

## 2025-03-13 NOTE — PROGRESS NOTES
[facility] Hospital Medicine Progress Note        Chief Complaint: Inpatient Follow-up for     HPI:     74-year-old female with significant history of HTN, hypothyroidism, lupus on prednisone, CellCept was brought to the ED by family with complaints of recurrent falls at home with associated loss of consciousness, falls has been happening since last November.  Patient is lives alone and at baseline was independent of all activities of daily living.  Chest x-ray negative in the ED, hemodynamics stable, CT head was concerning for trace SDH, cerebral atrophy, small-vessel ischemic change, scalp hematoma, CT C-spine with no traumatic injuries, CT angiogram head and neck with no large vessel occlusion.  MRI brain was ordered to further evaluate recurrent falls with loss of consciousness which was negative for any acute pathology, noted left parieto-occipital scalp hematoma.  Echocardiogram with diastolic dysfunction, intact EF.  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  EEG with diffuse slowing, no epileptiform discharges.  Therapy Services evaluated and recommended skilled nursing facility placement, case management consulted for the same.  Patient had temp spike on 3/10 which was evaluated with chest x-ray and had concerns for right-sided pneumonia, two-view chest x-ray was ordered.  Two-view chest x-ray confirmed right-sided pneumonia, initiated Levaquin, MRSA PCR negative, no concern for aspiration, patient also significantly orthostatics positive on 03/11, treated with IV fluids, initiated fludrocortisone       Interval Hx:   Afebrile.  Seen and examined at bedside.  Undergoing PT.  Remained orthostatic.  Denies any dizziness, palpitations.  Reports generalized weakness.  Tolerating p.o..  No family members at bedside.        Objective/physical exam:  Vitals:    03/12/25 2053  03/12/25 2332 03/13/25 0403 03/13/25 0742   BP:  128/65 130/72 137/76   Patient Position:       Pulse:  79 77 87   Resp:    18   Temp: 100.1 °F (37.8 °C) 100.1 °F (37.8 °C) 98.8 °F (37.1 °C)    TempSrc:  Oral Oral    SpO2:  97% 97% 96%   Weight:       Height:         General: In no acute distress, afebrile  Respiratory: Clear to auscultation bilaterally  Cardiovascular: S1, S2, no appreciable murmur  Abdomen: Soft, nontender, BS +  MSK: Warm, no lower extremity edema, no clubbing or cyanosis  Neurologic: Alert and oriented x4, moving all extremities with good strength     Lab Results   Component Value Date     (L) 03/12/2025    K 3.5 03/12/2025     03/12/2025    CO2 24 03/12/2025    BUN 12.4 03/12/2025    CREATININE 0.65 03/12/2025    CALCIUM 8.1 (L) 03/12/2025      Lab Results   Component Value Date    ALT 20 03/11/2025    AST 23 03/11/2025    ALKPHOS 52 03/11/2025    BILITOT 0.7 03/11/2025      Lab Results   Component Value Date    WBC 6.65 03/11/2025    HGB 12.6 03/11/2025    HCT 35.4 (L) 03/11/2025    MCV 88.1 03/11/2025     03/11/2025           Medications:   fludrocortisone  100 mcg Oral Daily    levoFLOXacin  750 mg Oral Daily    levothyroxine  75 mcg Oral Before breakfast    midodrine  2.5 mg Oral BID WM    predniSONE  5 mg Oral Daily    sodium chloride  1,000 mg Oral BID    trospium  20 mg Oral BID        Current Facility-Administered Medications:     acetaminophen, 1,000 mg, Oral, Q6H PRN    bisacodyL, 10 mg, Rectal, Daily PRN    haloperidol lactate, 5 mg, Intravenous, Q6H PRN    labetalol, 10 mg, Intravenous, Q6H PRN    melatonin, 9 mg, Oral, Nightly PRN    ondansetron, 4 mg, Intravenous, Q8H PRN     Assessment/Plan:    Orthostatic hypotension   Recurrent falls and syncope with collapse  Traumatic scalp hematoma due to above  Severe deconditioning  Sepsis secondary to right-sided hospital-acquired pneumonia s/p Rx  Acute on chronic [id number]   Chronic lupus currently  on prednisone and CellCept   Immunosuppressed status due to above      HX:  Lupus, HTN, hypothyroidism, asthma    Plan:   -remains orthostatic.  Continue fludrocortisone, prednisone.  We will consider increasing midodrine if necessary.  Encourage p.o. intake .  Recommend compression socks and abdominal better with PT.  Small fluid bolus if necessary  -monitor sodium periodically.  Continue salt tablets 1 g b.i.d.  -continue thyroid supplementation  -she is currently receiving levofloxacin for pneumonia.  doing well from respiratory standpoint.  Off all antibiotics.  Continue aspiration precautions  -other home medications were reviewed.  I see that CellCept is on hold given her ongoing treatment for possible sepsis secondary to pneumonia    Lovenox     Disposition :  DC to rehab when she is no longer orthostatic        Adalberto De Jesus MD

## 2025-03-13 NOTE — PT/OT/SLP PROGRESS
Physical Therapy Treatment    Patient Name:  Ana Maria Callejas   MRN:  [id number]    Recommendations:     Discharge therapy intensity: Moderate Intensity Therapy   Discharge Equipment Recommendations: walker, rolling  Barriers to discharge: Impaired mobility and Ongoing medical needs    Assessment:     Ana Maria Callejas is a 74 y.o. female admitted with a medical diagnosis of acute metabolic encephalopathy, scalp hematoma, mild SDH, milk hyponatremia.  She presents with the following impairments/functional limitations: weakness, impaired endurance, impaired self care skills, impaired functional mobility, gait instability, impaired balance, impaired cognition.    Patient continues to have +orthostatics however after donning rusty hoes and abdominal binder patient able to ambulate in matthews, BP stable @ 90s/50s.    Rehab Prognosis: Good; patient would benefit from acute skilled PT services to address these deficits and reach maximum level of function.    Recent Surgery: * No surgery found *      Plan:     During this hospitalization, patient would benefit from acute PT services 5 x/week to address the identified rehab impairments via gait training, therapeutic activities, therapeutic exercises and progress toward the following goals:    Plan of Care Expires:  04/05/25    Subjective     Chief Complaint: cold  Patient/Family Comments/goals: none stated   Pain/Comfort:         Objective:     Communicated with NSG prior to session.  Patient found HOB elevated with peripheral IV, telemetry, PureWick upon PT entry to room.     General Precautions: Standard, fall  Orthopedic Precautions: N/A  Braces: N/A  Respiratory Status: Room air  Blood Pressure:    Semi-supine: 165/87 HR 96   Seated: 137/81 HR 96   Standing after ~ 3 mins: 97/63    Seated in chair: 145/83   Standing w/rusty hoes and abd binder: 89/64 HR 99   Standing w/increased tension of abd binder: 100/67   During amb: 92/60, 91/57   Seated Post session: 146/80  Skin Integrity:  Visible skin intact      Functional Mobility:  Bed Mobility:  Supine to sit: SBA  Transfers:     Sit to Stand: contact guard assistance  with rolling walker  Bed to chair: CGA w/RW via step t/f  Gait: 20'/30' x2/50' x2 w/RW, CGA      Education:  Patient provided with verbal education education regarding PT role/goals/POC, fall prevention, safety awareness, and discharge/DME recommendations.  Understanding was verbalized.     Patient left up in chair with all lines intact, call button in reach, and nurse notified    GOALS:   Multidisciplinary Problems       Physical Therapy Goals          Problem: Physical Therapy    Goal Priority Disciplines Outcome Interventions   Physical Therapy Goal     PT, PT/OT Progressing    Description: Goals to be met by: 2025     Patient will increase functional independence with mobility by performin. Supine to sit with Stand-by Assistance  2. Sit to stand transfer with Stand-by Assistance  3. Gait  x 150 feet with Stand-by Assistance using Rolling Walker or LRAD.                          Time Tracking:     PT Received On: 25  PT Start Time: 0945     PT Stop Time: 1035  PT Total Time (min): 50 min     Billable Minutes: Gait Training 20 and Therapeutic Activity 30    Treatment Type: Treatment  PT/PTA: PTA     Number of PTA visits since last PT visit: 5     2025

## 2025-03-14 PROBLEM — R41.82 AMS (ALTERED MENTAL STATUS): Status: RESOLVED | Noted: 2025-03-07 | Resolved: 2025-03-14

## 2025-03-14 PROCEDURE — 25000003 PHARM REV CODE 250: Performed by: INTERNAL MEDICINE

## 2025-03-14 PROCEDURE — 97530 THERAPEUTIC ACTIVITIES: CPT

## 2025-03-14 PROCEDURE — 97168 OT RE-EVAL EST PLAN CARE: CPT

## 2025-03-14 PROCEDURE — 97535 SELF CARE MNGMENT TRAINING: CPT

## 2025-03-14 PROCEDURE — 97164 PT RE-EVAL EST PLAN CARE: CPT

## 2025-03-14 PROCEDURE — 25000003 PHARM REV CODE 250: Performed by: NURSE PRACTITIONER

## 2025-03-14 PROCEDURE — 25000003 PHARM REV CODE 250: Performed by: HOSPITALIST

## 2025-03-14 PROCEDURE — 21400001 HC TELEMETRY ROOM

## 2025-03-14 PROCEDURE — 63600175 PHARM REV CODE 636 W HCPCS: Performed by: HOSPITALIST

## 2025-03-14 PROCEDURE — 25000242 PHARM REV CODE 250 ALT 637 W/ HCPCS: Performed by: INTERNAL MEDICINE

## 2025-03-14 RX ORDER — FLUDROCORTISONE ACETATE 0.1 MG/1
100 TABLET ORAL DAILY
Start: 2025-03-15 | End: 2025-03-18 | Stop reason: HOSPADM

## 2025-03-14 RX ORDER — MIDODRINE HYDROCHLORIDE 2.5 MG/1
2.5 TABLET ORAL
Status: DISCONTINUED | OUTPATIENT
Start: 2025-03-14 | End: 2025-03-18 | Stop reason: HOSPADM

## 2025-03-14 RX ORDER — LEVOTHYROXINE SODIUM 75 UG/1
75 TABLET ORAL
Start: 2025-03-15 | End: 2026-03-15

## 2025-03-14 RX ORDER — TROSPIUM CHLORIDE 20 MG/1
20 TABLET, FILM COATED ORAL 2 TIMES DAILY
Start: 2025-03-14 | End: 2026-03-14

## 2025-03-14 RX ORDER — SODIUM CHLORIDE 1 G/1
1000 TABLET ORAL 2 TIMES DAILY
Start: 2025-03-14

## 2025-03-14 RX ORDER — ACETAMINOPHEN 500 MG
1000 TABLET ORAL EVERY 6 HOURS PRN
Start: 2025-03-14

## 2025-03-14 RX ORDER — MIDODRINE HYDROCHLORIDE 2.5 MG/1
2.5 TABLET ORAL 2 TIMES DAILY WITH MEALS
Start: 2025-03-14 | End: 2025-03-18 | Stop reason: HOSPADM

## 2025-03-14 RX ADMIN — Medication 9 MG: at 08:03

## 2025-03-14 RX ADMIN — ACETAMINOPHEN 1000 MG: 500 TABLET ORAL at 08:03

## 2025-03-14 RX ADMIN — FLUDROCORTISONE ACETATE 100 MCG: 0.1 TABLET ORAL at 09:03

## 2025-03-14 RX ADMIN — LEVOFLOXACIN 750 MG: 500 TABLET, FILM COATED ORAL at 09:03

## 2025-03-14 RX ADMIN — LEVOTHYROXINE SODIUM 75 MCG: 0.05 TABLET ORAL at 05:03

## 2025-03-14 RX ADMIN — TROSPIUM CHLORIDE 20 MG: 20 TABLET ORAL at 08:03

## 2025-03-14 RX ADMIN — TROSPIUM CHLORIDE 20 MG: 20 TABLET ORAL at 09:03

## 2025-03-14 RX ADMIN — PREDNISONE 5 MG: 5 TABLET ORAL at 09:03

## 2025-03-14 RX ADMIN — SODIUM CHLORIDE 1000 MG: 1 TABLET ORAL at 09:03

## 2025-03-14 RX ADMIN — SODIUM CHLORIDE 1000 MG: 1 TABLET ORAL at 08:03

## 2025-03-14 NOTE — PT/OT/SLP PROGRESS
Occupational Therapy   Re-evaluation    Name: Ana Maria Callejas  MRN: [id number]  Recent Surgery: * No surgery found *      Recommendations:     Discharge therapy intensity: Moderate Intensity Therapy (or low with 24/7 supervision)  Discharge Equipment Recommendations:  to be determined by next level of care  Barriers to discharge:  Decreased caregiver support, Other (Comment) (ongoing medical needs; impaired ability to complete ADLs compared to baseline abilities)    Assessment:     Ana Maria Callejas is a 74 y.o. female with a medical diagnosis of acute metabolic encephalopathy, scalp hematoma, mild SDH, milk hyponatremia.  She presents with the following performance deficits affecting function: weakness, impaired endurance, decreased upper extremity function, impaired self care skills, impaired functional mobility, gait instability, impaired cardiopulmonary response to activity.      Pt continues to have +Orthostatic BP readings despite donning DANIELLE hose and abdominal binder prior to mobility. BP recorded below and relayed to nursing. Pt only reporting mild dizziness and able to ambulated 100 ft in hallway. She has made great progress toward ADL goals but limited by low BP. Pt could discharge home with low/ and 24/7 supervision if available.     Rehab Prognosis: Good; patient would benefit from acute skilled OT services to address these deficits and reach maximum level of function.       Plan:     Patient to be seen 4 x/week to address the above listed problems via self-care/home management, therapeutic activities, therapeutic exercises, neuromuscular re-education  Plan of Care Expires: 04/09/25  Plan of Care Reviewed with: patient    Subjective     Chief Complaint: mild dizziness  Patient/Family Comments/goals: return home    Pain/Comfort:  Pain Rating 1: 0/10    Patients cultural, spiritual, U[place]din conflicts given the current situation: no    Objective:     OT communicated with nursing prior to session.      Patient  was found HOB elevated with peripheral IV, pulse ox (continuous), telemetry upon OT entry to room.    General Precautions: Standard, fall  Orthopedic Precautions: N/A  Braces: N/A    Vital Signs: Respiratory Status: on room air    Blood Pressure: (abdominal binder and DANIELLE Hose donned)  Supine: 158/83  Sitting EOB: 144/80  Standin/64  Standing after 10 reps calf raises: 99/66  Seated rest break then after 10 reps standing marches: 85/59  Seated EOB: 138/88  After amb 100 ft: 79/54  Seated in recliner, legs elevated: 157/90    Bed Mobility:    Patient completed Supine to Sit with modified independence    Functional Mobility/Transfers:  Patient completed Sit <> Stand Transfer with stand by assistance  with  rolling walker   Functional Mobility: Amb 100 ft with RW and CGA/SBA overall    Activities of Daily Living:  Feeding:  independence    Upper Body Dressing: set up A    Lower Body Dressing: set up A to don socks      AMPAC 6 Click ADL:  AMPAC Total Score: 23    Functional Cognition:  Orientation: oriented to Person, Place, Time, and Situation    Visual Perceptual Skills:  Intact    Upper Extremity Function:  Right Upper Extremity:   WFL    Left Upper Extremity:  WFL    Balance:   Intact    Therapeutic Positioning  Risk for acquired pressure injuries is decreased due to ability to get to BSC/toilet with assist and intact sensation.    OT interventions performed during the course of today's session in an effort to prevent and/or reduce acquired pressure injuries:   Education was provided on benefits of and recommendations for therapeutic positioning  Therapeutic positioning was provided at the conclusion of session to offload all bony prominences for the prevention and/or reduction of pressure injuries    Skin assessment: all bony prominences were assessed    Findings: known area of altered skin integrity at bruising at occipital region, neck    OT recommendations for therapeutic positioning throughout  hospitalization:   Follow [facility] Pressure Injury Prevention Protocol    Patient Education:  Patient provided with verbal education education regarding fall prevention and safety awareness.  Understanding was verbalized.     Patient left up in chair with call button in reach    GOALS:   Multidisciplinary Problems       Occupational Therapy Goals          Problem: Occupational Therapy    Goal Priority Disciplines Outcome Interventions   Occupational Therapy Goal     OT, PT/OT Progressing    Description: Goals to be met by 3/5/2025:    Pt will complete grooming standing at sink with LRAD with SBA.  Pt will complete UB dressing with SBA.  Pt will complete LB dressing with SBA using LRAD.  Pt will complete toileting with SBA using LRAD.  Pt will complete functional mobility to/from toilet and toilet transfer with SBA using LRAD.                         History:     No past medical history on file.    No past surgical history on file.    Time Tracking:     OT Date of Treatment: 03/14/25  OT Start Time: 1044  OT Stop Time: 1115  OT Total Time (min): 31 min    Billable Minutes:Re-eval 1 unit  Self Care/Home Management 1 unit    3/14/2025

## 2025-03-14 NOTE — PT/OT/SLP EVAL
"Physical Therapy Re-Evaluation    Patient Name:  Ana Maria Callejas   MRN:  [id number]    Recommendations:     Discharge therapy intensity: Moderate Intensity Therapy   Discharge Equipment Recommendations: walker, rolling   Barriers to discharge: Impaired mobility and Ongoing medical needs    Assessment:     Ana Maria Callejas is a 74 y.o. female admitted with a medical diagnosis of encephalopathy, scalp hematoma, mild SDH,hyponatremia. .  She presents with the following impairments/functional limitations: weakness, impaired endurance, impaired self care skills, impaired functional mobility, gait instability, impaired balance, impaired cognition.    Pt continues to have +Orthostatic BP readings despite donning DANIELLE hose and abdominal binder prior to mobility. Pt with c/o of mild dizziness during ambulation. Pt ambulated 100ft SBA using RW. BP recorded throughout session; nsg notified. Unable to ambulate further due to low BP. SEE BP READINGS BELOW.     Rehab Prognosis: Good; patient would benefit from acute skilled PT services to address these deficits and reach maximum level of function.    Recent Surgery: * No surgery found *      Plan:     During this hospitalization, patient would benefit from acute PT services 5 x/week to address the identified rehab impairments via gait training, therapeutic activities, therapeutic exercises and progress toward the following goals:    Plan of Care Expires:  04/14/25    PT/PTA conference to discuss PT POC and patient's progression towards goals held with Bernadette Waddell PTA.     Subjective     Chief Complaint: "mild dizziness"  Patient/Family Comments/goals: get bettter  Pain/Comfort:  Pain Rating 1: 0/10    Patients cultural, spiritual, Roman Catholic conflicts given the current situation: no    Objective:     Communicated with nsg prior to session.  Patient found HOB elevated with peripheral IV, pulse ox (continuous), telemetry  upon PT entry to room.    General Precautions: Standard, " fall  Orthopedic Precautions:N/A   Braces: N/A  Respiratory Status: Room air  Blood Pressure:   Supine: 158/83  Sitting EOB: 144/80  Standin/64  Standing after 10 reps calf raises: 99/66  Seated rest break then after 10 reps standing marches: 85/59  Seated EOB: 138/88  After amb 100 ft: 79/54  Seated in recliner, legs elevated: 157/90      Exams:  RLE ROM: WFL  RLE Strength: WFL  LLE ROM: WFL  LLE Strength: WFL  Skin integrity: Visible skin intact      Functional Mobility:  Bed Mobility:     Supine to Sit: modified independence  Transfers:     Sit to Stand:  stand by assistance with rolling walker  Gait: Pt amb 100ft SBA using RW demo step through pattern; no LOB. C/o of mild dizziness but no other symptoms reported. Unable to mobilize further due to low BP.      AM-PAC 6 CLICK MOBILITY  Total Score:21       Treatment & Education:      Patient provided with verbal education education regarding PT role/goals/POC, fall prevention, safety awareness, and discharge/DME recommendations.  Understanding was verbalized.     Patient left up in chair with all lines intact, call button in reach, and nsg notified.    GOALS:   Multidisciplinary Problems       Physical Therapy Goals          Problem: Physical Therapy    Goal Priority Disciplines Outcome Interventions   Physical Therapy Goal     PT, PT/OT Progressing    Description: Goals to be met by: 2025     Patient will increase functional independence with mobility by performin. Supine to sit with Stand-by Assistance  2. Sit to stand transfer with Stand-by Assistance  3. Gait  x 150 feet with Stand-by Assistance using Rolling Walker or LRAD.                          History:     No past medical history on file.    No past surgical history on file.    Time Tracking:     PT Received On: 25  PT Start Time: 1044     PT Stop Time: 1114  PT Total Time (min): 30 min     Billable Minutes: Re-eval 10 and Therapeutic Activity 20      2025

## 2025-03-14 NOTE — PROGRESS NOTES
[facility] Hospital Medicine Progress Note        Chief Complaint: Inpatient Follow-up for     HPI:     74-year-old female with significant history of HTN, hypothyroidism, lupus on prednisone, CellCept was brought to the ED by family with complaints of recurrent falls at home with associated loss of consciousness, falls has been happening since last November.  Patient is lives alone and at baseline was independent of all activities of daily living.  Chest x-ray negative in the ED, hemodynamics stable, CT head was concerning for trace SDH, cerebral atrophy, small-vessel ischemic change, scalp hematoma, CT C-spine with no traumatic injuries, CT angiogram head and neck with no large vessel occlusion.  MRI brain was ordered to further evaluate recurrent falls with loss of consciousness which was negative for any acute pathology, noted left parieto-occipital scalp hematoma.  Echocardiogram with diastolic dysfunction, intact EF.  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  EEG with diffuse slowing, no epileptiform discharges.  Therapy Services evaluated and recommended skilled nursing facility placement, case management consulted for the same.  Patient had temp spike on 3/10 which was evaluated with chest x-ray and had concerns for right-sided pneumonia, two-view chest x-ray was ordered.  Two-view chest x-ray confirmed right-sided pneumonia, initiated Levaquin, MRSA PCR negative, no concern for aspiration, patient also significantly orthostatics positive on 03/11, treated with IV fluids, initiated fludrocortisone       Interval Hx:     No acute events overnight but she continued to have orthostatic hypotension. Otherwise no issues    Objective/physical exam:  Vitals:    03/13/25 2109 03/13/25 2329 03/14/25 0318 03/14/25 0740   BP:  (!) 147/72 (!) 147/72 (!) 144/78   Patient Position:        Pulse:  83 82 88   Resp:  18 18    Temp: 99.7 °F (37.6 °C) 98.2 °F (36.8 °C) 98.7 °F (37.1 °C) 98.7 °F (37.1 °C)   TempSrc:  Oral Oral Oral   SpO2:  96% 95% 96%   Weight:       Height:         General: In no acute distress, afebrile  Respiratory: Clear to auscultation bilaterally  Cardiovascular: S1, S2, no appreciable murmur  Abdomen: Soft, nontender, BS +  MSK: Warm, no lower extremity edema, no clubbing or cyanosis  Neurologic: Alert and oriented x4, moving all extremities with good strength     Lab Results   Component Value Date     (L) 03/13/2025    K 3.3 (L) 03/13/2025     03/13/2025    CO2 24 03/13/2025    BUN 12.2 03/13/2025    CREATININE 0.68 03/13/2025    CALCIUM 8.2 (L) 03/13/2025      Lab Results   Component Value Date    ALT 20 03/11/2025    AST 23 03/11/2025    ALKPHOS 52 03/11/2025    BILITOT 0.7 03/11/2025      Lab Results   Component Value Date    WBC 6.65 03/11/2025    HGB 12.6 03/11/2025    HCT 35.4 (L) 03/11/2025    MCV 88.1 03/11/2025     03/11/2025           Medications:   fludrocortisone  100 mcg Oral Daily    levoFLOXacin  750 mg Oral Daily    levothyroxine  75 mcg Oral Before breakfast    midodrine  2.5 mg Oral BID WM    predniSONE  5 mg Oral Daily    sodium chloride  1,000 mg Oral BID    trospium  20 mg Oral BID        Current Facility-Administered Medications:     acetaminophen, 1,000 mg, Oral, Q6H PRN    bisacodyL, 10 mg, Rectal, Daily PRN    haloperidol lactate, 5 mg, Intravenous, Q6H PRN    labetalol, 10 mg, Intravenous, Q6H PRN    melatonin, 9 mg, Oral, Nightly PRN    ondansetron, 4 mg, Intravenous, Q8H PRN     Assessment/Plan:    Orthostatic hypotension   Recurrent falls and syncope with collapse  Traumatic scalp hematoma due to above  Severe deconditioning  Sepsis secondary to right-sided hospital-acquired pneumonia s/p Rx  Acute on chronic [id number]   Chronic lupus currently on prednisone and CellCept   Immunosuppressed status due to  above  Moderate malnutrition      HX:  Lupus, HTN, hypothyroidism, asthma    Plan:   -remains orthostatic.  Continue fludrocortisone, prednisone. Uptitrate midodrine. Encourage p.o. intake .  Recommend compression socks and abdominal better with PT.  Small fluid bolus if necessary  -monitor sodium periodically.  Continue salt tablets 1 g b.i.d.  -continue thyroid supplementation  -she is currently receiving levofloxacin for pneumonia.  doing well from respiratory standpoint.  Off all antibiotics.  Continue aspiration precautions  -other home medications were reviewed.  I see that CellCept is on hold given her ongoing treatment for possible sepsis secondary to pneumonia     Lovenox     Disposition :  DC to rehab when she is no longer orthostatic        Adalberto De Jesus MD

## 2025-03-14 NOTE — PLAN OF CARE
Problem: Physical Therapy  Goal: Physical Therapy Goal  Description: Goals to be met by: 2025     Patient will increase functional independence with mobility by performin. Supine to sit with Stand-by Assistance  2. Sit to stand transfer with Stand-by Assistance  3. Gait  x 150 feet with Stand-by Assistance using Rolling Walker or LRAD.     Outcome: Progressing      Rx sent pt informed

## 2025-03-15 LAB
BACTERIA BLD CULT: NORMAL
BACTERIA BLD CULT: NORMAL

## 2025-03-15 PROCEDURE — 25000003 PHARM REV CODE 250: Performed by: INTERNAL MEDICINE

## 2025-03-15 PROCEDURE — 25000003 PHARM REV CODE 250: Performed by: NURSE PRACTITIONER

## 2025-03-15 PROCEDURE — 25000242 PHARM REV CODE 250 ALT 637 W/ HCPCS: Performed by: INTERNAL MEDICINE

## 2025-03-15 PROCEDURE — 63600175 PHARM REV CODE 636 W HCPCS: Performed by: HOSPITALIST

## 2025-03-15 PROCEDURE — 21400001 HC TELEMETRY ROOM

## 2025-03-15 PROCEDURE — 25000003 PHARM REV CODE 250: Performed by: HOSPITALIST

## 2025-03-15 RX ADMIN — SODIUM CHLORIDE 1000 MG: 1 TABLET ORAL at 09:03

## 2025-03-15 RX ADMIN — FLUDROCORTISONE ACETATE 100 MCG: 0.1 TABLET ORAL at 08:03

## 2025-03-15 RX ADMIN — TROSPIUM CHLORIDE 20 MG: 20 TABLET ORAL at 08:03

## 2025-03-15 RX ADMIN — TROSPIUM CHLORIDE 20 MG: 20 TABLET ORAL at 09:03

## 2025-03-15 RX ADMIN — LEVOFLOXACIN 750 MG: 500 TABLET, FILM COATED ORAL at 08:03

## 2025-03-15 RX ADMIN — LEVOTHYROXINE SODIUM 75 MCG: 0.05 TABLET ORAL at 05:03

## 2025-03-15 RX ADMIN — PREDNISONE 5 MG: 5 TABLET ORAL at 08:03

## 2025-03-15 RX ADMIN — ACETAMINOPHEN 1000 MG: 500 TABLET ORAL at 09:03

## 2025-03-15 RX ADMIN — SODIUM CHLORIDE 1000 MG: 1 TABLET ORAL at 08:03

## 2025-03-15 RX ADMIN — Medication 9 MG: at 09:03

## 2025-03-15 NOTE — PLAN OF CARE
Problem: Adult Inpatient Plan of Care  Goal: Plan of Care Review  Outcome: Progressing     Problem: Stroke, Ischemic (Includes Transient Ischemic Attack)  Goal: Optimal Coping  Outcome: Progressing  Goal: Effective Bowel Elimination  Outcome: Progressing

## 2025-03-15 NOTE — PROGRESS NOTES
[facility] Hospital Medicine Progress Note        Chief Complaint: Inpatient Follow-up for     HPI:     74-year-old female with significant history of HTN, hypothyroidism, lupus on prednisone, CellCept was brought to the ED by family with complaints of recurrent falls at home with associated loss of consciousness, falls has been happening since last November.  Patient is lives alone and at baseline was independent of all activities of daily living.  Chest x-ray negative in the ED, hemodynamics stable, CT head was concerning for trace SDH, cerebral atrophy, small-vessel ischemic change, scalp hematoma, CT C-spine with no traumatic injuries, CT angiogram head and neck with no large vessel occlusion.  MRI brain was ordered to further evaluate recurrent falls with loss of consciousness which was negative for any acute pathology, noted left parieto-occipital scalp hematoma.  Echocardiogram with diastolic dysfunction, intact EF.  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  EEG with diffuse slowing, no epileptiform discharges.  Therapy Services evaluated and recommended skilled nursing facility placement, case management consulted for the same.  Patient had temp spike on 3/10 which was evaluated with chest x-ray and had concerns for right-sided pneumonia, two-view chest x-ray was ordered.  Two-view chest x-ray confirmed right-sided pneumonia, initiated Levaquin, MRSA PCR negative, no concern for aspiration, patient also significantly orthostatics positive on 03/11, treated with IV fluids, initiated fludrocortisone .  She continued to have orthostatic hypotension      Interval Hx:   Fluctuating blood pressures observed.  Otherwise hemodynamically stable.  She was alert, resting in the bed.  Tolerating p.o..  No bowel movements for the past few days and states it is common for her due to  IBS        Objective/physical exam:  Vitals:    03/14/25 2007 03/14/25 2034 03/14/25 2342 03/15/25 0326   BP:  120/64 123/70 125/70   Patient Position:  Lying     Pulse:  85 86 83   Resp:  17     Temp: 98.3 °F (36.8 °C) 98.1 °F (36.7 °C) 98.5 °F (36.9 °C) 99.1 °F (37.3 °C)   TempSrc:  Oral Oral Oral   SpO2:  (!) 92% (!) 94% 95%   Weight:       Height:         General: In no acute distress, afebrile  Respiratory: Clear to auscultation bilaterally  Cardiovascular: S1, S2, no appreciable murmur  Abdomen: Soft, nontender, BS +  MSK: Warm, no lower extremity edema, no clubbing or cyanosis  Neurologic: Alert and oriented x4, moving all extremities with good strength     Lab Results   Component Value Date     (L) 03/13/2025    K 3.3 (L) 03/13/2025     03/13/2025    CO2 24 03/13/2025    BUN 12.2 03/13/2025    CREATININE 0.68 03/13/2025    CALCIUM 8.2 (L) 03/13/2025      Lab Results   Component Value Date    ALT 20 03/11/2025    AST 23 03/11/2025    ALKPHOS 52 03/11/2025    BILITOT 0.7 03/11/2025      Lab Results   Component Value Date    WBC 6.65 03/11/2025    HGB 12.6 03/11/2025    HCT 35.4 (L) 03/11/2025    MCV 88.1 03/11/2025     03/11/2025           Medications:   fludrocortisone  100 mcg Oral Daily    levoFLOXacin  750 mg Oral Daily    levothyroxine  75 mcg Oral Before breakfast    midodrine  2.5 mg Oral TID WM    predniSONE  5 mg Oral Daily    sodium chloride  1,000 mg Oral BID    trospium  20 mg Oral BID        Current Facility-Administered Medications:     acetaminophen, 1,000 mg, Oral, Q6H PRN    bisacodyL, 10 mg, Rectal, Daily PRN    haloperidol lactate, 5 mg, Intravenous, Q6H PRN    labetalol, 10 mg, Intravenous, Q6H PRN    melatonin, 9 mg, Oral, Nightly PRN    ondansetron, 4 mg, Intravenous, Q8H PRN     Assessment/Plan:    Orthostatic hypotension   Recurrent falls and syncope with collapse  Traumatic scalp hematoma due to above  Severe deconditioning  Sepsis secondary to right-sided  hospital-acquired pneumonia s/p Rx  Acute on chronic [id number]   Chronic lupus currently on prednisone and CellCept   Immunosuppressed status due to above  Moderate malnutrition      HX:  Lupus, HTN, hypothyroidism, asthma    Plan:   -remains orthostatic.  Continue fludrocortisone, prednisone.  Continue midodrine. Encourage p.o. intake .  Recommend compression socks and abdominal better with PT.  Small fluid bolus if necessary  -monitor sodium periodically.  Continue salt tablets 1 g b.i.d.  -continue thyroid supplementation  -she is currently receiving levofloxacin for pneumonia.  doing well from respiratory standpoint.  Off all antibiotics.  Continue aspiration precautions  -other home medications were reviewed.  I see that CellCept is on hold given her ongoing treatment for possible sepsis secondary to pneumonia     Lovenox     Disposition :  DC to rehab when she is no longer orthostatic        Adalberto De Jesus MD

## 2025-03-16 LAB
ANION GAP SERPL CALC-SCNC: 12 MEQ/L
BUN SERPL-MCNC: 19 MG/DL (ref 9.8–20.1)
CALCIUM SERPL-MCNC: 8.5 MG/DL (ref 8.4–10.2)
CHLORIDE SERPL-SCNC: 100 MMOL/L (ref 98–107)
CO2 SERPL-SCNC: 23 MMOL/L (ref 23–31)
CREAT SERPL-MCNC: 0.79 MG/DL (ref 0.55–1.02)
CREAT/UREA NIT SERPL: 24
GFR SERPLBLD CREATININE-BSD FMLA CKD-EPI: >60 ML/MIN/1.73/M2
GLUCOSE SERPL-MCNC: 179 MG/DL (ref 82–115)
POTASSIUM SERPL-SCNC: 3.4 MMOL/L (ref 3.5–5.1)
SODIUM SERPL-SCNC: 135 MMOL/L (ref 136–145)

## 2025-03-16 PROCEDURE — 36415 COLL VENOUS BLD VENIPUNCTURE: CPT | Performed by: INTERNAL MEDICINE

## 2025-03-16 PROCEDURE — 25000003 PHARM REV CODE 250: Performed by: NURSE PRACTITIONER

## 2025-03-16 PROCEDURE — 25000003 PHARM REV CODE 250: Performed by: INTERNAL MEDICINE

## 2025-03-16 PROCEDURE — 97110 THERAPEUTIC EXERCISES: CPT | Mod: CQ

## 2025-03-16 PROCEDURE — 97530 THERAPEUTIC ACTIVITIES: CPT | Mod: CQ

## 2025-03-16 PROCEDURE — 80048 BASIC METABOLIC PNL TOTAL CA: CPT | Performed by: INTERNAL MEDICINE

## 2025-03-16 PROCEDURE — 21400001 HC TELEMETRY ROOM

## 2025-03-16 PROCEDURE — 25000003 PHARM REV CODE 250: Performed by: HOSPITALIST

## 2025-03-16 PROCEDURE — 63600175 PHARM REV CODE 636 W HCPCS: Performed by: HOSPITALIST

## 2025-03-16 PROCEDURE — 25000242 PHARM REV CODE 250 ALT 637 W/ HCPCS: Performed by: INTERNAL MEDICINE

## 2025-03-16 RX ADMIN — ACETAMINOPHEN 1000 MG: 500 TABLET ORAL at 08:03

## 2025-03-16 RX ADMIN — FLUDROCORTISONE ACETATE 100 MCG: 0.1 TABLET ORAL at 08:03

## 2025-03-16 RX ADMIN — TROSPIUM CHLORIDE 20 MG: 20 TABLET ORAL at 08:03

## 2025-03-16 RX ADMIN — SODIUM CHLORIDE 1000 MG: 1 TABLET ORAL at 08:03

## 2025-03-16 RX ADMIN — LEVOFLOXACIN 750 MG: 500 TABLET, FILM COATED ORAL at 08:03

## 2025-03-16 RX ADMIN — Medication 9 MG: at 08:03

## 2025-03-16 RX ADMIN — LEVOTHYROXINE SODIUM 75 MCG: 0.05 TABLET ORAL at 05:03

## 2025-03-16 RX ADMIN — PREDNISONE 5 MG: 5 TABLET ORAL at 08:03

## 2025-03-16 RX ADMIN — MIDODRINE HYDROCHLORIDE 2.5 MG: 2.5 TABLET ORAL at 04:03

## 2025-03-16 NOTE — PLAN OF CARE
Problem: Stroke, Ischemic (Includes Transient Ischemic Attack)  Goal: Optimal Nutrition Intake  Outcome: Progressing     Problem: Skin Injury Risk Increased  Goal: Skin Health and Integrity  Outcome: Progressing     Problem: Fall Injury Risk  Goal: Absence of Fall and Fall-Related Injury  Outcome: Progressing

## 2025-03-16 NOTE — PROGRESS NOTES
[facility] Hospital Medicine Progress Note        Chief Complaint: Inpatient Follow-up     HPI:     74-year-old female with significant history of HTN, hypothyroidism, lupus on prednisone, CellCept was brought to the ED by family with complaints of recurrent falls at home with associated loss of consciousness, falls has been happening since last November.  Patient is lives alone and at baseline was independent of all activities of daily living.  Chest x-ray negative in the ED, hemodynamics stable, CT head was concerning for trace SDH, cerebral atrophy, small-vessel ischemic change, scalp hematoma, CT C-spine with no traumatic injuries, CT angiogram head and neck with no large vessel occlusion.  MRI brain was ordered to further evaluate recurrent falls with loss of consciousness which was negative for any acute pathology, noted left parieto-occipital scalp hematoma.  Echocardiogram with diastolic dysfunction, intact EF.  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  EEG with diffuse slowing, no epileptiform discharges.  Therapy Services evaluated and recommended skilled nursing facility placement, case management consulted for the same.  Patient had temp spike on 3/10 which was evaluated with chest x-ray and had concerns for right-sided pneumonia, two-view chest x-ray was ordered.  Two-view chest x-ray confirmed right-sided pneumonia, initiated Levaquin, MRSA PCR negative, no concern for aspiration, patient also significantly orthostatics positive on 03/11, treated with IV fluids, initiated fludrocortisone .  She continued to have orthostatic hypotension      Interval Hx:   Awaits placement    Patient was seen and examined at bedside  No concerns expressed. Denies feeling dizzy working with therapy. BP fluctuates.       Objective/physical exam:  Vitals:    03/16/25 0020 03/16/25  0349 03/16/25 0753 03/16/25 1154   BP: (!) 156/92 (!) 147/85 (!) 155/84 (!) 157/88   Pulse: 82 85 89 92   Resp:       Temp: 98.1 °F (36.7 °C) 98.5 °F (36.9 °C) 98.3 °F (36.8 °C) 98 °F (36.7 °C)   TempSrc: Oral Oral Oral Oral   SpO2: 96% (!) 93% (!) 94% (!) 93%   Weight:       Height:         General: In no acute distress, afebrile  Respiratory: Clear to auscultation bilaterally  Cardiovascular: S1, S2, no appreciable murmur  Abdomen: Soft, nontender, BS +  MSK: Warm, no lower extremity edema, no clubbing or cyanosis  Neurologic: Alert and oriented x4, moving all extremities with good strength     Lab Results   Component Value Date     (L) 03/13/2025    K 3.3 (L) 03/13/2025     03/13/2025    CO2 24 03/13/2025    BUN 12.2 03/13/2025    CREATININE 0.68 03/13/2025    CALCIUM 8.2 (L) 03/13/2025      Lab Results   Component Value Date    ALT 20 03/11/2025    AST 23 03/11/2025    ALKPHOS 52 03/11/2025    BILITOT 0.7 03/11/2025      Lab Results   Component Value Date    WBC 6.65 03/11/2025    HGB 12.6 03/11/2025    HCT 35.4 (L) 03/11/2025    MCV 88.1 03/11/2025     03/11/2025           Medications:   fludrocortisone  100 mcg Oral Daily    levoFLOXacin  750 mg Oral Daily    levothyroxine  75 mcg Oral Before breakfast    midodrine  2.5 mg Oral TID WM    predniSONE  5 mg Oral Daily    sodium chloride  1,000 mg Oral BID    trospium  20 mg Oral BID        Current Facility-Administered Medications:     acetaminophen, 1,000 mg, Oral, Q6H PRN    bisacodyL, 10 mg, Rectal, Daily PRN    haloperidol lactate, 5 mg, Intravenous, Q6H PRN    labetalol, 10 mg, Intravenous, Q6H PRN    melatonin, 9 mg, Oral, Nightly PRN    ondansetron, 4 mg, Intravenous, Q8H PRN     Assessment/Plan:    Orthostatic hypotension   Recurrent falls and syncope with collapse  Traumatic scalp hematoma due to above  Severe deconditioning  Sepsis secondary to right-sided hospital-acquired pneumonia s/p Rx  Acute on chronic  [id number]   Chronic lupus currently on prednisone and CellCept   Immunosuppressed status due to above  Moderate malnutrition      HX:  Lupus, HTN, hypothyroidism, asthma    Plan:   -Continue fludrocortisone, prednisone.  Continue midodrine. Encourage p.o. intake .  Recommend compression socks and abdominal binder with PT. Check  orthostatics, adjust regimen accordingly  -Continue salt tablets 1 g b.i.d.Check BMP  -Continue thyroid supplementation  -She is currently receiving levofloxacin for pneumonia-started 3/12. Continue Aspiration precautions .  -other home medications were reviewed. CellCept is on hold given her ongoing treatment for possible sepsis secondary to pneumonia  -PT OT working, Case management on board. Awaits Rehab transfer if no longer orthostatic       Dvt PPX :SCD    Disposition :  DC to rehab when she is longer orthostatic      Lilo Hudson MD

## 2025-03-16 NOTE — PT/OT/SLP PROGRESS
Physical Therapy Treatment    Patient Name:  Ana Maria Callejas   MRN:  [id number]    Recommendations:     Discharge therapy intensity: Moderate Intensity Therapy   Discharge Equipment Recommendations: walker, rolling  Barriers to discharge: Impaired mobility and Ongoing medical needs    Assessment:     Ana Maria Callejas is a 74 y.o. female admitted with a medical diagnosis of acute metabolic encephalopathy, scalp hematoma, mild SDH, milk hyponatremia.  She presents with the following impairments/functional limitations: weakness, impaired endurance, impaired self care skills, impaired functional mobility, gait instability, impaired balance, impaired cognition.    Patient continues to be limited by low blood pressure. Patient unable to ambulate this date.     Rehab Prognosis: Good; patient would benefit from acute skilled PT services to address these deficits and reach maximum level of function.    Recent Surgery: * No surgery found *      Plan:     During this hospitalization, patient would benefit from acute PT services 5 x/week to address the identified rehab impairments via gait training, therapeutic activities, therapeutic exercises and progress toward the following goals:    Plan of Care Expires:  25    Subjective     Chief Complaint: none stated  Patient/Family Comments/goals: none stated   Pain/Comfort:         Objective:     Communicated with NSG prior to session.  Patient found up in chair with telemetry, peripheral IV (toe hoes) upon PT entry to room.     General Precautions: Standard, fall  Orthopedic Precautions: N/A  Braces: N/A  Respiratory Status: Room air  Blood Pressure:    Seated: 97/59 (no abdominal binder)   Seated w/abd binder: 90/57   Standin/48  Attempted to assess BP while doing thera ex - unable to get.  Skin Integrity: Visible skin intact      Functional Mobility:  Transfers:     Sit to Stand: contact guard assistance with rolling walker  X2-3 reps  Gait: deferred 2/2 low BP  Balance:  static/dynamic seated: SBA  Static/dynamic standing: CGA    Therapeutic Exercises:  Standing marches x30 reps      Education:  Patient provided with verbal education education regarding PT role/goals/POC, fall prevention, safety awareness, and discharge/DME recommendations.  Understanding was verbalized.     Patient left up in chair with all lines intact, call button in reach, and nurse notified    GOALS:   Multidisciplinary Problems       Physical Therapy Goals          Problem: Physical Therapy    Goal Priority Disciplines Outcome Interventions   Physical Therapy Goal     PT, PT/OT Progressing    Description: Goals to be met by: 2025     Patient will increase functional independence with mobility by performin. Supine to sit with Stand-by Assistance  2. Sit to stand transfer with Stand-by Assistance  3. Gait  x 150 feet with Stand-by Assistance using Rolling Walker or LRAD.                          Time Tracking:     PT Received On: 25  PT Start Time: 1346     PT Stop Time: 1409  PT Total Time (min): 23 min     Billable Minutes: Therapeutic Activity 15 and Therapeutic Exercise 8    Treatment Type: Treatment  PT/PTA: PTA     Number of PTA visits since last PT visit: 2025

## 2025-03-17 PROCEDURE — 97530 THERAPEUTIC ACTIVITIES: CPT | Mod: CO

## 2025-03-17 PROCEDURE — 25000003 PHARM REV CODE 250: Performed by: HOSPITALIST

## 2025-03-17 PROCEDURE — 21400001 HC TELEMETRY ROOM

## 2025-03-17 PROCEDURE — 25000003 PHARM REV CODE 250: Performed by: INTERNAL MEDICINE

## 2025-03-17 PROCEDURE — 97535 SELF CARE MNGMENT TRAINING: CPT | Mod: CO

## 2025-03-17 PROCEDURE — 25000003 PHARM REV CODE 250: Performed by: NURSE PRACTITIONER

## 2025-03-17 PROCEDURE — 97110 THERAPEUTIC EXERCISES: CPT | Mod: CQ

## 2025-03-17 PROCEDURE — 63600175 PHARM REV CODE 636 W HCPCS: Performed by: HOSPITALIST

## 2025-03-17 PROCEDURE — 25000242 PHARM REV CODE 250 ALT 637 W/ HCPCS: Performed by: INTERNAL MEDICINE

## 2025-03-17 RX ADMIN — TROSPIUM CHLORIDE 20 MG: 20 TABLET ORAL at 09:03

## 2025-03-17 RX ADMIN — LEVOFLOXACIN 750 MG: 500 TABLET, FILM COATED ORAL at 09:03

## 2025-03-17 RX ADMIN — MIDODRINE HYDROCHLORIDE 2.5 MG: 2.5 TABLET ORAL at 09:03

## 2025-03-17 RX ADMIN — LEVOTHYROXINE SODIUM 75 MCG: 0.05 TABLET ORAL at 05:03

## 2025-03-17 RX ADMIN — SODIUM CHLORIDE 1000 MG: 1 TABLET ORAL at 09:03

## 2025-03-17 RX ADMIN — ACETAMINOPHEN 1000 MG: 500 TABLET ORAL at 09:03

## 2025-03-17 RX ADMIN — PREDNISONE 5 MG: 5 TABLET ORAL at 09:03

## 2025-03-17 RX ADMIN — TROSPIUM CHLORIDE 20 MG: 20 TABLET ORAL at 10:03

## 2025-03-17 RX ADMIN — Medication 9 MG: at 09:03

## 2025-03-17 RX ADMIN — FLUDROCORTISONE ACETATE 100 MCG: 0.1 TABLET ORAL at 09:03

## 2025-03-17 NOTE — PT/OT/SLP PROGRESS
Occupational Therapy   Treatment    Name: Ana Maria Callejas  MRN: [id number]  Admitting Diagnosis:  Repeated falls       Recommendations:     Recommended therapy intensity at discharge: Moderate Intensity Therapy   Discharge Equipment Recommendations:  walker, rolling  Barriers to discharge:  Decreased caregiver support    Assessment:     Ana Maria Callejas is a 74 y.o. female with a medical diagnosis of Repeated falls.  She presents with good motivation and participation. Performance deficits affecting function are weakness, impaired endurance, decreased upper extremity function, impaired self care skills, impaired functional mobility, gait instability, impaired cardiopulmonary response to activity.     Rehab Prognosis:  Good; patient would benefit from acute skilled OT services to address these deficits and reach maximum level of function.       Plan:     Patient to be seen 4 x/week to address the above listed problems via self-care/home management, therapeutic activities, therapeutic exercises, neuromuscular re-education  Plan of Care Expires: 04/09/25  Plan of Care Reviewed with: patient    Subjective     Pain/Comfort:  Pain Rating 1: 0/10    Objective:     Communicated with: nurse prior to session.  Patient found HOB elevated with telemetry, peripheral IV upon OT entry to room.    General Precautions: Standard, fall    Orthopedic Precautions:N/A  Braces: N/A  Respiratory Status: Room air     Occupational Performance:     Bed Mobility:    Patient completed Scooting/Bridging with stand by assistance  Patient completed Supine to Sit with stand by assistance     Functional Mobility/Transfers:  Patient completed Sit <> Stand Transfer with stand by assistance and contact guard assistance  with  rolling walker   Patient completed Toilet Transfer Step Transfer technique with stand by assistance and contact guard assistance with  rolling walker  Patient completed  Shower Transfer Step Transfer technique with stand by assistance and  contact guard assistance with rolling walker  Functional Mobility: ambulate in room to and from bathroom and shower with CGA with RW    Activities of Daily Living:  Grooming: supervision oral care standing at sink with RW  Bathing: minimum assistance sitting on shower bench and standing  Upper Body Dressing: set up with hospital gown    Lower Body Dressing: set up total to don/doff Binh hose    Therapeutic Positioning    OT interventions performed during the course of today's session in an effort to prevent and/or reduce acquired pressure injuries:   Therapeutic positioning was provided at the conclusion of session to offload all bony prominences for the prevention and/or reduction of pressure injuries    [facility] 6 Click ADL: 22    Patient Education:  Patient provided with verbal education and demonstrations education regarding fall prevention and safety awareness.  Understanding was verbalized, however additional teaching warranted.      Patient left up in chair with all lines intact and call button in reach.    GOALS:   Multidisciplinary Problems       Occupational Therapy Goals          Problem: Occupational Therapy    Goal Priority Disciplines Outcome Interventions   Occupational Therapy Goal     OT, PT/OT Progressing    Description: Goals to be met by 3/5/2025:    Pt will complete grooming standing at sink with LRAD with SBA.  Pt will complete UB dressing with SBA.  Pt will complete LB dressing with SBA using LRAD.  Pt will complete toileting with SBA using LRAD.  Pt will complete functional mobility to/from toilet and toilet transfer with SBA using LRAD.                         Time Tracking:     OT Date of Treatment: 03/17/25  OT Start Time: 0958  OT Stop Time: 1043  OT Total Time (min): 45 min    Billable Minutes:Self Care/Home Management 30  Therapeutic Activity 15    OT/GARDENIA: GARDENIA     Number of GARDENIA visits since last OT visit: 1    3/17/2025

## 2025-03-17 NOTE — PT/OT/SLP PROGRESS
"Physical Therapy Treatment    Patient Name:  Ana Maria Callejas   MRN:  [id number]    Recommendations:     Discharge therapy intensity: Moderate Intensity Therapy   Discharge Equipment Recommendations: walker, rolling  Barriers to discharge: Impaired mobility and Ongoing medical needs    Assessment:     Ana Maria Callejas is a 74 y.o. female admitted with a medical diagnosis of acute metabolic encephalopathy, scalp hematoma, mild SDH, milk hyponatremia.  She presents with the following impairments/functional limitations: weakness, impaired endurance, impaired self care skills, impaired functional mobility, gait instability, impaired balance, impaired cognition .    Rehab Prognosis: Good; patient would benefit from acute skilled PT services to address these deficits and reach maximum level of function.    Recent Surgery: * No surgery found *      Plan:     During this hospitalization, patient would benefit from acute PT services 5 x/week to address the identified rehab impairments via gait training, therapeutic activities, therapeutic exercises and progress toward the following goals:    Plan of Care Expires:  04/14/25    Subjective     Chief Complaint: fatigue  Patient/Family Comments/goals: pt states she exercises "fairly" regularly.    Pain/Comfort:  Pain Rating 1: 0/10      Objective:     Communicated with pts nurse prior to session.  Patient found supine with peripheral IV, telemetry upon PT entry to room.     General Precautions: Standard, fall  Orthopedic Precautions: N/A  Braces: N/A  Respiratory Status: Room air  Blood Pressure: NT  Skin Integrity: Visible skin intact    Therapeutic Activities/Exercises: Pt declined to get OOB due to having just returned to bed and having been up most of morning but did agree to perform ex in bed.   Pt directed in B LE ROM and strengthening exercises, x 10 each, ankle PF/DF, hip rotation IR/ER, glut sets, SAQ, SLR, hip abduction.  Pt tolerated well.    Education:  Patient provided with " verbal education and demonstrations education regarding  LE exercises .  Understanding was verbalized.     Patient left HOB elevated with all lines intact, call button in reach, nurse notified, and son present    GOALS:   Multidisciplinary Problems       Physical Therapy Goals          Problem: Physical Therapy    Goal Priority Disciplines Outcome Interventions   Physical Therapy Goal     PT, PT/OT Progressing    Description: Goals to be met by: 2025     Patient will increase functional independence with mobility by performin. Supine to sit with Stand-by Assistance  2. Sit to stand transfer with Stand-by Assistance  3. Gait  x 150 feet with Stand-by Assistance using Rolling Walker or LRAD.                          Time Tracking:     PT Received On: 25  PT Start Time: 1410     PT Stop Time: 1426  PT Total Time (min): 16 min     Billable Minutes: Therapeutic Exercise 16 min    Treatment Type: Treatment  PT/PTA: PTA     Number of PTA visits since last PT visit: 2     2025

## 2025-03-17 NOTE — PROGRESS NOTES
[facility] Hospital Medicine Progress Note        Chief Complaint: Inpatient Follow-up for     HPI:     74-year-old female with significant history of HTN, hypothyroidism, lupus on prednisone, CellCept was brought to the ED by family with complaints of recurrent falls at home with associated loss of consciousness, falls has been happening since last November.  Patient is lives alone and at baseline was independent of all activities of daily living.  Chest x-ray negative in the ED, hemodynamics stable, CT head was concerning for trace SDH, cerebral atrophy, small-vessel ischemic change, scalp hematoma, CT C-spine with no traumatic injuries, CT angiogram head and neck with no large vessel occlusion.  MRI brain was ordered to further evaluate recurrent falls with loss of consciousness which was negative for any acute pathology, noted left parieto-occipital scalp hematoma.  Echocardiogram with diastolic dysfunction, intact EF.  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  EEG with diffuse slowing, no epileptiform discharges.  Therapy Services evaluated and recommended skilled nursing facility placement, case management consulted for the same.  Patient had temp spike on 3/10 which was evaluated with chest x-ray and had concerns for right-sided pneumonia, two-view chest x-ray was ordered.  Two-view chest x-ray confirmed right-sided pneumonia, initiated Levaquin, MRSA PCR negative, no concern for aspiration, patient also significantly orthostatics positive on 03/11, treated with IV fluids, initiated fludrocortisone .  She continued to have orthostatic hypotension      Interval Hx:   No acute ebents.Fluctuating blood pressures observed.  Otherwise hemodynamically stable.  She was alert, resting in the bed.  Tolerating p.o..        Objective/physical exam:  Vitals:    03/17/25 0732  03/17/25 0749 03/17/25 0752 03/17/25 1120   BP: (!) 146/80 134/71 91/61 (!) 150/80   Patient Position: Lying Sitting Standing    Pulse: 85 93 97 82   Resp:       Temp: 98.4 °F (36.9 °C)   97.9 °F (36.6 °C)   TempSrc: Oral   Oral   SpO2: (!) 92%  97% 95%   Weight:       Height:         General: In no acute distress, afebrile  Respiratory: Clear to auscultation bilaterally  Cardiovascular: S1, S2, no appreciable murmur  Abdomen: Soft, nontender, BS +  MSK: Warm, no lower extremity edema, no clubbing or cyanosis  Neurologic: Alert and oriented x4, moving all extremities with good strength     Lab Results   Component Value Date     (L) 03/16/2025    K 3.4 (L) 03/16/2025     03/16/2025    CO2 23 03/16/2025    BUN 19.0 03/16/2025    CREATININE 0.79 03/16/2025    CALCIUM 8.5 03/16/2025      Lab Results   Component Value Date    ALT 20 03/11/2025    AST 23 03/11/2025    ALKPHOS 52 03/11/2025    BILITOT 0.7 03/11/2025      Lab Results   Component Value Date    WBC 6.65 03/11/2025    HGB 12.6 03/11/2025    HCT 35.4 (L) 03/11/2025    MCV 88.1 03/11/2025     03/11/2025           Medications:   fludrocortisone  100 mcg Oral Daily    levoFLOXacin  750 mg Oral Daily    levothyroxine  75 mcg Oral Before breakfast    midodrine  2.5 mg Oral TID WM    predniSONE  5 mg Oral Daily    sodium chloride  1,000 mg Oral BID    trospium  20 mg Oral BID        Current Facility-Administered Medications:     acetaminophen, 1,000 mg, Oral, Q6H PRN    bisacodyL, 10 mg, Rectal, Daily PRN    haloperidol lactate, 5 mg, Intravenous, Q6H PRN    labetalol, 10 mg, Intravenous, Q6H PRN    melatonin, 9 mg, Oral, Nightly PRN    ondansetron, 4 mg, Intravenous, Q8H PRN     Assessment/Plan:    Orthostatic hypotension   Recurrent falls and syncope with collapse  Traumatic scalp hematoma due to above  Severe deconditioning  Sepsis secondary to right-sided hospital-acquired pneumonia s/p Rx  Acute on chronic [id number]    Chronic lupus currently on prednisone and CellCept   Immunosuppressed status due to above  Moderate malnutrition      HX:  Lupus, HTN, hypothyroidism, asthma    Plan:   -remains orthostatic.  Continue fludrocortisone, prednisone.  Continue midodrine. Encourage p.o. intake .  Recommend compression socks and abdominal better with PT.  Small fluid bolus if necessary  -monitor sodium periodically.  Continue salt tablets 1 g b.i.d.  -continue thyroid supplementation  -she is currently receiving levofloxacin for pneumonia.  doing well from respiratory standpoint.  Off all antibiotics.  Continue aspiration precautions  -other home medications were reviewed.  I see that CellCept is on hold given her ongoing treatment for possible sepsis secondary to pneumonia     Lovenox     Disposition :  DC to rehab when she is no longer orthostatic        Adalberto De Jesus MD

## 2025-03-17 NOTE — PLAN OF CARE
Notified Meliza with [facility] regarding positive orthostatics. Planned for DC tomorrow to [facility].

## 2025-03-17 NOTE — PLAN OF CARE
Problem: Stroke, Ischemic (Includes Transient Ischemic Attack)  Goal: Optimal Coping  3/16/2025 2145 by Shira Barnes RN  Outcome: Progressing  3/16/2025 2145 by Shira Barnes RN  Outcome: Progressing  Goal: Effective Bowel Elimination  3/16/2025 2145 by Shira Barnes RN  Outcome: Progressing  3/16/2025 2145 by Shira Barnes RN  Outcome: Progressing  Goal: Optimal Cerebral Tissue Perfusion  3/16/2025 2145 by Shira Barnes RN  Outcome: Progressing  3/16/2025 2145 by Shira Barnes RN  Outcome: Progressing  Goal: Optimal Cognitive Function  3/16/2025 2145 by Shira Barnes RN  Outcome: Progressing  3/16/2025 2145 by Shira Barnes RN  Outcome: Progressing  Goal: Improved Communication Skills  3/16/2025 2145 by Shira Barnes RN  Outcome: Progressing  3/16/2025 2145 by Shira Barnes RN  Outcome: Progressing  Goal: Optimal Functional Ability  3/16/2025 2145 by Shira Barnes RN  Outcome: Progressing  3/16/2025 2145 by Shira Barnes RN  Outcome: Progressing  Goal: Optimal Nutrition Intake  3/16/2025 2145 by Shira Barnes RN  Outcome: Progressing  3/16/2025 2145 by Shira Barnes RN  Outcome: Progressing  Goal: Effective Oxygenation and Ventilation  3/16/2025 2145 by Shira Barnes RN  Outcome: Progressing  3/16/2025 2145 by Shira Barnes RN  Outcome: Progressing  Goal: Improved Sensorimotor Function  3/16/2025 2145 by Shira Barnes RN  Outcome: Progressing  3/16/2025 2145 by Shira Barnes RN  Outcome: Progressing  Goal: Safe and Effective Swallow  3/16/2025 2145 by Shira Barnes RN  Outcome: Progressing  3/16/2025 2145 by Shira Barnes RN  Outcome: Progressing  Goal: Effective Urinary Elimination  3/16/2025 2145 by Shira Barnes RN  Outcome: Progressing  3/16/2025 2145 by Shira Barnes RN  Outcome: Progressing     Problem: Restraint, Nonviolent  Goal: Absence of Harm or Injury  3/16/2025 2145 by Shira Barnes,  RN  Outcome: Met  3/16/2025 2145 by Shira Barnes RN  Outcome: Progressing     Problem: Fall Injury Risk  Goal: Absence of Fall and Fall-Related Injury  3/16/2025 2145 by Shira Barnes RN  Outcome: Progressing  3/16/2025 2145 by Shira Barnes RN  Outcome: Progressing

## 2025-03-17 NOTE — PROGRESS NOTES
Inpatient Nutrition Assessment    Admit Date: 3/4/2025   Total duration of encounter: 13 days   Patient Age: 74 y.o.    Nutrition Recommendation/Prescription     Continue regular diet.   Encourage small and frequent meals.   Boost Very High Calorie (provides 530 kcal, 22 g protein per serving) BID.  Replace electrolytes.   Consider adding appetite stimulant if oral intake remains inadequate.     Communication of Recommendations: reviewed with patient    Nutrition Assessment     Malnutrition Assessment/Nutrition-Focused Physical Exam  Malnutrition Context: acute illness or injury (03/13/25 1252)  Malnutrition Level: moderate (03/13/25 1252)        Subcutaneous Fat (Malnutrition): mild depletion (03/13/25 1252)     Upper Arm Region (Subcutaneous Fat Loss): mild depletion     Muscle Mass (Malnutrition): moderate depletion (03/13/25 1252)  [place] Region (Muscle Loss): mild depletion  Clavicle Bone Region (Muscle Loss): moderate depletion  Clavicle and Acromion Bone Region (Muscle Loss): moderate depletion                 Fluid Accumulation (Malnutrition): other (see comments) (Not present) (03/13/25 1252)     Hand  Strength, Right (Malnutrition): Unable to assess (03/13/25 1252)  A minimum of two characteristics is recommended for diagnosis of either severe or non-severe malnutrition.    Chart Review    Reason Seen: length of stay and follow-up    Malnutrition Screening Tool Results   Have you recently lost weight without trying?: No  Have you been eating poorly because of a decreased appetite?: No   MST Score: 0   Diagnosis:  Orthostatic hypotension   Recurrent falls and syncope with collapse  Traumatic scalp hematoma due to above  Severe deconditioning  Sepsis secondary to right-sided hospital-acquired pneumonia s/p Rx  Acute on chronic [id number]-EVWQK-vlvehksfz   Chronic lupus currently on prednisone and CellCept   Immunosuppressed status due to above    Relevant Medical History: Lupus     Scheduled  Medications:  fludrocortisone, 100 mcg, Daily  levoFLOXacin, 750 mg, Daily  levothyroxine, 75 mcg, Before breakfast  midodrine, 2.5 mg, TID WM  predniSONE, 5 mg, Daily  sodium chloride, 1,000 mg, BID  trospium, 20 mg, BID    Continuous Infusions:   PRN Medications:  acetaminophen, 1,000 mg, Q6H PRN  bisacodyL, 10 mg, Daily PRN  haloperidol lactate, 5 mg, Q6H PRN  labetalol, 10 mg, Q6H PRN  melatonin, 9 mg, Nightly PRN  ondansetron, 4 mg, Q8H PRN    Calorie Containing IV Medications: no significant kcals from medications at this time    Recent Labs   Lab 03/11/25  0237 03/12/25  0931 03/13/25  0817 03/16/25  1451   * 135* 134* 135*   K 3.6 3.5 3.3* 3.4*   CALCIUM 8.3* 8.1* 8.2* 8.5   MG 2.40  --   --   --     103 104 100   CO2 22* 24 24 23   BUN 11.5 12.4 12.2 19.0   CREATININE 0.69 0.65 0.68 0.79   EGFRNORACEVR >60 >60 >60 >60   GLUCOSE 93 91 94 179*   BILITOT 0.7  --   --   --    ALKPHOS 52  --   --   --    ALT 20  --   --   --    AST 23  --   --   --    ALBUMIN 3.3*  --   --   --    WBC 6.65  --   --   --    HGB 12.6  --   --   --    HCT 35.4*  --   --   --      Nutrition Orders:  Diet Adult Regular Standard Tray  Dietary nutrition supplements BID; Boost Very High Calorie Nutritional Drink - Any flavor    Appetite/Oral Intake: poor/25-50% of meals  Factors Affecting Nutritional Intake: decreased appetite  Social Needs Impacting Access to Food: unable to assess at this time; will attempt on follow-up  Food/Mu-ism/Cultural Preferences: none reported  Food Allergies: none reported  Last Bowel Movement: 03/15/25  Wound(s):      Comments    3/11/25 With other professionals during rounds. Per EMR, fair appetite. Has Boost Plus ordered with all meals. 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 Potassium being replaced as needed. Will follow-up early.     3/13/25 Working with therapy. Reports continued decreased  "appetite. Drinking some of Boost supplements. Last BM 3/9, no abdominal pain, nausea or vomiting.     3/17/25 Still with <50%/ poor intake of meals and drinking some of Boost. Encouraged Boost between meals. No GI complaints.     Anthropometrics    Height: 5' 7" (170.2 cm), Height Method: Stated  Last Weight: 52.6 kg (116 lb) (03/05/25 1343), Weight Method: Bed Scale  BMI (Calculated): 18.2  BMI Classification: underweight (BMI less than 18.5)     Ideal Body Weight (IBW), Female: 135 lb     % Ideal Body Weight, Female (lb): 85.93 %                             Usual Weight Provided By: unable to obtain usual weight    Wt Readings from Last 5 Encounters:   03/05/25 52.6 kg (116 lb)     Weight Change(s) Since Admission:   Wt Readings from Last 1 Encounters:   03/05/25 1343 52.6 kg (116 lb)   03/04/25 1126 52.2 kg (115 lb)   Admit Weight: 52.2 kg (115 lb) (03/04/25 1126), Weight Method: Estimated    Estimated Needs    Weight Used For Calorie Calculations: 52.6 kg (115 lb 15.4 oz)  Energy Calorie Requirements (kcal): 1578-1841kcals/d (30-35kcals/kg)  Energy Need Method: Kcal/kg  Weight Used For Protein Calculations: 52.6 kg (115 lb 15.4 oz)  Protein Requirements: 68-79g/d (1.3-1.5g/kg)  Fluid Requirements (mL): 1500mL fl/d (per MD)        Enteral Nutrition     Patient not receiving enteral nutrition at this time.    Parenteral Nutrition     Patient not receiving parenteral nutrition support at this time.    Evaluation of Received Nutrient Intake    Calories: not meeting estimated needs  Protein: not meeting estimated needs    Patient Education     Not applicable.    Nutrition Diagnosis     PES: Increased nutrient needs (kcals/protein) related to inability to consume sufficient nutrients as evidenced by underweight BMI (18.17). (active)     PES: Moderate acute disease or injury related malnutrition Related to inability to consume sufficient nutrients As Evidenced by:  - muscle mass depletion: 2 areas of moderate muscle " loss (Deltoid, Clavicle), 3 areas of mild muscle loss (Acromion, Temporalis, Trapezius) - loss of subcutaneous fat: 1 area of mild fat loss (Triceps Skinfold) new    Nutrition Interventions     Intervention(s): general/healthful diet, commercial beverage, prescription medication, and collaboration with other providers  Intervention(s): Oral nutritional supplement;Oral diet/nutrient modifications    Goal: Meet greater than 80% of nutritional needs by follow-up. (goal not met)  Goal: Consume % of oral supplements by follow-up. (goal progressing)    Nutrition Goals & Monitoring     Dietitian will monitor: food and beverage intake, weight change, electrolyte/renal panel, glucose/endocrine profile, and gastrointestinal profile  Discharge planning: continue regular diet with high calorie/protein oral supplements  Nutrition Risk/Follow-Up: high (follow-up in 1-4 days)   Please consult if re-assessment needed sooner.

## 2025-03-18 VITALS
OXYGEN SATURATION: 95 % | RESPIRATION RATE: 17 BRPM | WEIGHT: 116 LBS | TEMPERATURE: 98 F | BODY MASS INDEX: 18.21 KG/M2 | HEIGHT: 67 IN | HEART RATE: 88 BPM | DIASTOLIC BLOOD PRESSURE: 73 MMHG | SYSTOLIC BLOOD PRESSURE: 127 MMHG

## 2025-03-18 PROCEDURE — 97535 SELF CARE MNGMENT TRAINING: CPT | Mod: CO

## 2025-03-18 PROCEDURE — 25000003 PHARM REV CODE 250: Performed by: INTERNAL MEDICINE

## 2025-03-18 PROCEDURE — 63600175 PHARM REV CODE 636 W HCPCS: Performed by: HOSPITALIST

## 2025-03-18 PROCEDURE — 25000242 PHARM REV CODE 250 ALT 637 W/ HCPCS: Performed by: INTERNAL MEDICINE

## 2025-03-18 PROCEDURE — 97530 THERAPEUTIC ACTIVITIES: CPT | Mod: CO

## 2025-03-18 PROCEDURE — 25000003 PHARM REV CODE 250: Performed by: HOSPITALIST

## 2025-03-18 RX ORDER — MIDODRINE HYDROCHLORIDE 2.5 MG/1
2.5 TABLET ORAL
Start: 2025-03-18 | End: 2026-03-18

## 2025-03-18 RX ORDER — FLUDROCORTISONE ACETATE 0.1 MG/1
200 TABLET ORAL DAILY
Start: 2025-03-19 | End: 2025-04-18

## 2025-03-18 RX ORDER — FLUDROCORTISONE ACETATE 0.1 MG/1
200 TABLET ORAL DAILY
Status: DISCONTINUED | OUTPATIENT
Start: 2025-03-18 | End: 2025-03-18 | Stop reason: HOSPADM

## 2025-03-18 RX ADMIN — TROSPIUM CHLORIDE 20 MG: 20 TABLET ORAL at 08:03

## 2025-03-18 RX ADMIN — FLUDROCORTISONE ACETATE 200 MCG: 0.1 TABLET ORAL at 08:03

## 2025-03-18 RX ADMIN — MIDODRINE HYDROCHLORIDE 2.5 MG: 2.5 TABLET ORAL at 11:03

## 2025-03-18 RX ADMIN — SODIUM CHLORIDE 1000 MG: 1 TABLET ORAL at 08:03

## 2025-03-18 RX ADMIN — LEVOTHYROXINE SODIUM 75 MCG: 0.05 TABLET ORAL at 06:03

## 2025-03-18 RX ADMIN — LEVOFLOXACIN 750 MG: 500 TABLET, FILM COATED ORAL at 08:03

## 2025-03-18 RX ADMIN — PREDNISONE 5 MG: 5 TABLET ORAL at 08:03

## 2025-03-18 NOTE — PLAN OF CARE
Nurse can call report to Joyce [phone] @ [facility]

## 2025-03-18 NOTE — PLAN OF CARE
Problem: Adult Inpatient Plan of Care  Goal: Plan of Care Review  Outcome: Progressing     Problem: Stroke, Ischemic (Includes Transient Ischemic Attack)  Goal: Optimal Coping  Outcome: Progressing  Goal: Optimal Nutrition Intake  Outcome: Progressing

## 2025-03-18 NOTE — PT/OT/SLP PROGRESS
Occupational Therapy   Treatment    Name: Ana Maria Callejas  MRN: [id number]  Admitting Diagnosis:  Repeated falls       Recommendations:     Recommended therapy intensity at discharge: Moderate Intensity Therapy   Discharge Equipment Recommendations:  walker, rolling  Barriers to discharge:  Decreased caregiver support    Assessment:     Ana Maria Callejas is a 74 y.o. female with a medical diagnosis of Repeated falls.  She presents with good participation. Performance deficits affecting function are weakness, impaired endurance, decreased upper extremity function, impaired self care skills, impaired functional mobility, gait instability, impaired cardiopulmonary response to activity.     Rehab Prognosis:  Good; patient would benefit from acute skilled OT services to address these deficits and reach maximum level of function.       Plan:     Patient to be seen 4 x/week to address the above listed problems via self-care/home management, therapeutic activities, therapeutic exercises, neuromuscular re-education  Plan of Care Expires: 04/09/25  Plan of Care Reviewed with: patient    Subjective     Pain/Comfort:  Pain Rating 1: 0/10    Objective:     Communicated with: nurse prior to and following session.  Patient found HOB elevated with peripheral IV, telemetry upon OT entry to room.    General Precautions: Standard, fall    Orthopedic Precautions:N/A  Braces: N/A  Respiratory Status: Room air  Vital Signs: Orthostatics: Supine 163/84, Sitting 143/78, Standing 91/56     Occupational Performance:     Bed Mobility:    Patient completed Scooting/Bridging with stand by assistance  Patient completed Supine to Sit with stand by assistance     Functional Mobility/Transfers:  Patient completed Sit <> Stand Transfer with contact guard assistance  with  rolling walker   Patient completed Bed <> Chair Transfer using Step Transfer technique with contact guard assistance with rolling walker    Activities of Daily Living:  Lower Body Dressing:  set up to don socks sitting edge of bed    Therapeutic Activities:  Maintain dynamic sitting edge of bed with supervision     Therapeutic Positioning    OT interventions performed during the course of today's session in an effort to prevent and/or reduce acquired pressure injuries:   Therapeutic positioning was provided at the conclusion of session to offload all bony prominences for the prevention and/or reduction of pressure injuries    [facility] 6 Click ADL: 22    Patient Education:  Patient provided with verbal education and demonstrations education regarding fall prevention and safety awareness.  Patient was able to return demonstration.      Patient left up in chair with all lines intact and call button in reach.    GOALS:   Multidisciplinary Problems       Occupational Therapy Goals          Problem: Occupational Therapy    Goal Priority Disciplines Outcome Interventions   Occupational Therapy Goal     OT, PT/OT Progressing    Description: Goals to be met by 3/5/2025:    Pt will complete grooming standing at sink with LRAD with SBA.  Pt will complete UB dressing with SBA.  Pt will complete LB dressing with SBA using LRAD.  Pt will complete toileting with SBA using LRAD.  Pt will complete functional mobility to/from toilet and toilet transfer with SBA using LRAD.                         Time Tracking:     OT Date of Treatment: 03/18/25  OT Start Time: 0835  OT Stop Time: 0858  OT Total Time (min): 23 min    Billable Minutes:Self Care/Home Management 10  Therapeutic Activity 13    OT/GARDENIA: GARDENIA     Number of GARDENIA visits since last OT visit: 2    3/18/2025

## 2025-03-18 NOTE — PLAN OF CARE
Problem: Adult Inpatient Plan of Care  Goal: Plan of Care Review  3/18/2025 1214 by Debbie Sapp RN  Outcome: Met  3/18/2025 0957 by Debbie Sapp RN  Outcome: Progressing  Goal: Patient-Specific Goal (Individualized)  Outcome: Met  Goal: Absence of Hospital-Acquired Illness or Injury  Outcome: Met  Goal: Optimal Comfort and Wellbeing  Outcome: Met  Goal: Readiness for Transition of Care  Outcome: Met     Problem: Infection  Goal: Absence of Infection Signs and Symptoms  Outcome: Met     Problem: Stroke, Ischemic (Includes Transient Ischemic Attack)  Goal: Optimal Coping  3/18/2025 1214 by Debbie Sapp RN  Outcome: Met  3/18/2025 0957 by Debbie Sapp RN  Outcome: Progressing  Goal: Effective Bowel Elimination  Outcome: Met  Goal: Optimal Cerebral Tissue Perfusion  Outcome: Met  Goal: Optimal Cognitive Function  Outcome: Met  Goal: Improved Communication Skills  Outcome: Met  Goal: Optimal Functional Ability  Outcome: Met  Goal: Optimal Nutrition Intake  3/18/2025 1214 by Debbie Sapp RN  Outcome: Met  3/18/2025 0957 by Debbie Sapp RN  Outcome: Progressing  Goal: Effective Oxygenation and Ventilation  Outcome: Met  Goal: Improved Sensorimotor Function  Outcome: Met  Goal: Safe and Effective Swallow  Outcome: Met  Goal: Effective Urinary Elimination  Outcome: Met     Problem: Skin Injury Risk Increased  Goal: Skin Health and Integrity  Outcome: Met     Problem: Fall Injury Risk  Goal: Absence of Fall and Fall-Related Injury  Outcome: Met

## 2025-03-18 NOTE — PLAN OF CARE
03/18/25 1044   Final Note   Assessment Type Final Discharge Note   Anticipated Discharge Disposition Rehab   Post-Acute Status   Post-Acute Authorization Placement   Post-Acute Placement Status Set-up Complete/Auth obtained  ([facility])   Discharge Delays (!) Ambulance Transport/Facility Transport     [facility] will provide transport

## 2025-03-18 NOTE — DISCHARGE SUMMARY
[facility] Medicine Discharge Summary    Admit Date: 3/4/2025  Discharge Date and Time: 3/18/523899:11 AM  Admitting Physician:  Team  Discharging Physician: Adalberto De Jesus MD.  Primary Care Physician: No primary care provider on file.  Consults: Nephrology and Neurology         74-year-old female with significant history of HTN, hypothyroidism, lupus on prednisone, CellCept was brought to the ED by family with complaints of recurrent falls at home with associated loss of consciousness, falls has been happening since last November.  Patient is lives alone and at baseline was independent of all activities of daily living.  Chest x-ray negative in the ED, hemodynamics stable, CT head was concerning for trace SDH, cerebral atrophy, small-vessel ischemic change, scalp hematoma, CT C-spine with no traumatic injuries, CT angiogram head and neck with no large vessel occlusion.  MRI brain was ordered to further evaluate recurrent falls with loss of consciousness which was negative for any acute pathology, noted left parieto-occipital scalp hematoma.  Echocardiogram with diastolic dysfunction, intact EF.  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  EEG with diffuse slowing, no epileptiform discharges.  Therapy Services evaluated and recommended skilled nursing facility placement, case management consulted for the same.  Patient had temp spike on 3/10 which was evaluated with chest x-ray and had concerns for right-sided pneumonia, two-view chest x-ray was ordered.  Two-view chest x-ray confirmed right-sided pneumonia, initiated Levaquin, MRSA PCR negative, no concern for aspiration, patient also significantly orthostatics positive on 03/11, treated with IV fluids, initiated fludrocortisone .  She continued to have orthostatic hypotension for which midodrine was added and  "fludrocortisone dose was adjusted.. will discharge her today.  Recommended wearing compression socks, abdominal binder during therapy.  Also suggested increasing p.o. intake.  Patient verbalized understanding.                Orthostatic hypotension   Recurrent falls and syncope with collapse  Traumatic scalp hematoma due to above  Severe deconditioning  Sepsis secondary to right-sided hospital-acquired pneumonia s/p Rx  Acute on chronic [id number]-lanktqnko   Chronic lupus currently on prednisone and CellCept   Immunosuppressed status due to above  Moderate malnutrition        HX:  Lupus, HTN, hypothyroidism, asthma        Pt was seen and examined on the day of discharge  Vitals:  VITAL SIGNS: 24 HRS MIN & MAX LAST   Temp  Min: 97.9 °F (36.6 °C)  Max: 99.1 °F (37.3 °C) 98.5 °F (36.9 °C)   BP  Min: 150/80  Max: 163/84 (!) 163/84   Pulse  Min: 80  Max: 92  92   Resp  Min: 17  Max: 18 17   SpO2  Min: 92 %  Max: 96 % (!) 92 %     General: Comfortable, not in distress  Respiratory: Clear to auscultation bilaterally, nonlabored breathing  Cardiovascular: RRR, S1, S2  Abdominal: Soft, nontender, nondistended  Neurological: AOx4, no focal deficits  Psychiatric: Cooperative        Procedures Performed: No admission procedures for hospital encounter.     Significant Diagnostic Studies: See Full reports for all details    No results for input(s): "WBC", "RBC", "HGB", "HCT", "MCV", "MCH", "MCHC", "RDW", "PLT", "MPV", "GRAN", "LYMPH", "MONO", "BASO", "NRBC" in the last 168 hours.    Recent Labs   Lab 03/12/25  0931 03/13/25  0817 03/16/25  1451   * 134* 135*   K 3.5 3.3* 3.4*    104 100   CO2 24 24 23   BUN 12.4 12.2 19.0   CREATININE 0.65 0.68 0.79   CALCIUM 8.1* 8.2* 8.5        Microbiology Results (last 7 days)       Procedure Component Value Units Date/Time    Blood Culture [[id number]]  (Normal) Collected: 03/10/25 0934    Order Status: Completed Specimen: Blood from Antecubital, Left Updated: 03/15/25 " 1101     Blood Culture No Growth at 5 days    Blood Culture [[id number]]  (Normal) Collected: 03/10/25 0745    Order Status: Completed Specimen: Blood from Hand, Right Updated: 03/15/25 0900     Blood Culture No Growth at 5 days             X-Ray Chest PA And Lateral  Narrative: EXAMINATION:  XR CHEST PA AND LATERAL    CLINICAL HISTORY:  Rule out pneumonia;    TECHNIQUE:  Two views of the chest    COMPARISON:  03/10/2025    FINDINGS:  Slight right mid lung zone opacification concerning for developing infectious process.  No pleural effusion or pneumothorax.  Impression: Findings concerning for developing infectious process within the right mid lung zone.    Electronically signed by: Devyn Barker  Date:    03/11/2025  Time:    05:15         Medication List        START taking these medications      acetaminophen 500 MG tablet  Commonly known as: TYLENOL  Take 2 tablets (1,000 mg total) by mouth every 6 (six) hours as needed for Pain.     fludrocortisone 0.1 mg Tab  Commonly known as: FLORINEF  Take 2 tablets (200 mcg total) by mouth once daily.  Start taking on: March 19, 2025     midodrine 2.5 MG Tab  Commonly known as: PROAMATINE  Take 1 tablet (2.5 mg total) by mouth 3 (three) times daily with meals.     sodium chloride 1,000 mg Tbso oral tablet  Take 1 tablet (1,000 mg total) by mouth 2 (two) times daily.            CHANGE how you take these medications      levothyroxine 75 MCG tablet  Commonly known as: SYNTHROID  Take 1 tablet (75 mcg total) by mouth before breakfast.  What changed: Another medication with the same name was removed. Continue taking this medication, and follow the directions you see here.            CONTINUE taking these medications      predniSONE 5 MG tablet  Commonly known as: DELTASONE     trospium 20 mg Tab tablet  Commonly known as: sanctura  Take 1 tablet (20 mg total) by mouth 2 (two) times daily.            STOP taking these medications      amLODIPine 10 MG tablet  Commonly known  as: NORVASC     estrogens (conjugated) 0.3 MG tablet  Commonly known as: PREMARIN     mycophenolate 500 mg Tab  Commonly known as: CELLCEPT               Where to Get Your Medications        Information about where to get these medications is not yet available    Ask your nurse or doctor about these medications  acetaminophen 500 MG tablet  fludrocortisone 0.1 mg Tab  levothyroxine 75 MCG tablet  midodrine 2.5 MG Tab  sodium chloride 1,000 mg Tbso oral tablet  trospium 20 mg Tab tablet          Explained in detail to the patient about the discharge plan, medications, and follow-up visits. Pt understands and agrees with the treatment plan  Discharge Disposition:     Discharged Condition: stable  Diet-   Dietary Orders (From admission, onward)       Start     Ordered    03/11/25 1358  Dietary nutrition supplements BID; Boost Very High Calorie Nutritional Drink - Any flavor  Continuous        Question Answer Comment   Frequency: BID    Select PO Supplement: Boost Very High Calorie Nutritional Drink - Any flavor        03/11/25 1357    03/05/25 1017  Diet Adult Regular Standard Tray  Diet effective now        Question:  Tray type:  Answer:  Standard Tray    03/05/25 1016                   Medications Per DC med rec  Activities as tolerated    For further questions contact hospitalist office    Discharge time 33 minutes    For worsening symptoms, chest pain, shortness of breath, increased abdominal pain, high grade fever, stroke or stroke like symptoms, immediately go to the nearest Emergency Room or call 911 as soon as possible.      Adalberto Adams M.D, on 3/18/2025. at 10:11 AM.

## 2025-03-27 ENCOUNTER — TELEPHONE (OUTPATIENT)
Facility: CLINIC | Age: 75
End: 2025-03-27
Payer: MEDICARE

## 2025-03-27 NOTE — TELEPHONE ENCOUNTER
Called and spoke with ms. Page who stated the pt is being d/c today Pt is doing well and already has a pcp